# Patient Record
Sex: MALE | Race: WHITE | Employment: OTHER | URBAN - METROPOLITAN AREA
[De-identification: names, ages, dates, MRNs, and addresses within clinical notes are randomized per-mention and may not be internally consistent; named-entity substitution may affect disease eponyms.]

---

## 2017-03-01 PROBLEM — I10 ESSENTIAL HYPERTENSION, MALIGNANT: Status: ACTIVE | Noted: 2017-03-01

## 2017-03-01 PROBLEM — I70.0 AORTO-ILIAC ATHEROSCLEROSIS (HCC): Status: ACTIVE | Noted: 2017-03-01

## 2017-03-01 PROBLEM — I70.8 AORTO-ILIAC ATHEROSCLEROSIS: Status: ACTIVE | Noted: 2017-03-01

## 2017-03-01 PROBLEM — I70.0 AORTO-ILIAC ATHEROSCLEROSIS: Status: ACTIVE | Noted: 2017-03-01

## 2017-03-01 PROBLEM — I70.8 AORTO-ILIAC ATHEROSCLEROSIS (HCC): Status: ACTIVE | Noted: 2017-03-01

## 2017-05-17 PROCEDURE — 84154 ASSAY OF PSA FREE: CPT | Performed by: UROLOGY

## 2017-05-17 PROCEDURE — 84153 ASSAY OF PSA TOTAL: CPT | Performed by: UROLOGY

## 2017-05-17 PROCEDURE — 36415 COLL VENOUS BLD VENIPUNCTURE: CPT | Performed by: UROLOGY

## 2017-05-18 PROBLEM — N40.1 BENIGN NON-NODULAR PROSTATIC HYPERPLASIA WITH LOWER URINARY TRACT SYMPTOMS: Status: ACTIVE | Noted: 2017-05-18

## 2018-01-12 PROCEDURE — 36415 COLL VENOUS BLD VENIPUNCTURE: CPT | Performed by: UROLOGY

## 2018-01-12 PROCEDURE — 84154 ASSAY OF PSA FREE: CPT | Performed by: UROLOGY

## 2018-01-12 PROCEDURE — 84153 ASSAY OF PSA TOTAL: CPT | Performed by: UROLOGY

## 2018-01-18 PROBLEM — N28.1 KIDNEY CYST, ACQUIRED: Status: ACTIVE | Noted: 2018-01-18

## 2018-01-29 PROBLEM — N28.1 KIDNEY CYST, ACQUIRED: Status: RESOLVED | Noted: 2018-01-18 | Resolved: 2018-01-29

## 2018-01-29 PROBLEM — N40.1 BENIGN NON-NODULAR PROSTATIC HYPERPLASIA WITH LOWER URINARY TRACT SYMPTOMS: Status: RESOLVED | Noted: 2017-05-18 | Resolved: 2018-01-29

## 2018-01-29 PROBLEM — I10 ESSENTIAL HYPERTENSION, MALIGNANT: Status: RESOLVED | Noted: 2017-03-01 | Resolved: 2018-01-29

## 2018-07-12 PROCEDURE — 36415 COLL VENOUS BLD VENIPUNCTURE: CPT | Performed by: UROLOGY

## 2018-07-12 PROCEDURE — 84154 ASSAY OF PSA FREE: CPT | Performed by: UROLOGY

## 2018-07-12 PROCEDURE — 84153 ASSAY OF PSA TOTAL: CPT | Performed by: UROLOGY

## 2018-07-19 PROBLEM — R97.20 ELEVATED PROSTATE SPECIFIC ANTIGEN (PSA): Status: ACTIVE | Noted: 2018-07-19

## 2019-02-08 PROCEDURE — 84153 ASSAY OF PSA TOTAL: CPT | Performed by: UROLOGY

## 2019-02-08 PROCEDURE — 84154 ASSAY OF PSA FREE: CPT | Performed by: UROLOGY

## 2020-01-30 PROBLEM — F33.1 MODERATE EPISODE OF RECURRENT MAJOR DEPRESSIVE DISORDER (HCC): Status: ACTIVE | Noted: 2020-01-30

## 2022-02-02 PROBLEM — N40.0 BPH WITHOUT OBSTRUCTION/LOWER URINARY TRACT SYMPTOMS: Status: ACTIVE | Noted: 2017-05-18

## 2022-02-03 PROBLEM — Z85.828 HISTORY OF NONMELANOMA SKIN CANCER: Status: ACTIVE | Noted: 2022-02-03

## 2024-06-28 RX ORDER — OMEPRAZOLE 20 MG/1
20 CAPSULE, DELAYED RELEASE ORAL
COMMUNITY

## 2024-06-28 RX ORDER — CEFPODOXIME PROXETIL 200 MG/1
200 TABLET, FILM COATED ORAL 2 TIMES DAILY
Status: ON HOLD | COMMUNITY
End: 2024-07-03

## 2024-06-28 RX ORDER — PREDNISONE 20 MG/1
20 TABLET ORAL 3 TIMES DAILY
COMMUNITY

## 2024-07-03 ENCOUNTER — HOSPITAL ENCOUNTER (OUTPATIENT)
Facility: HOSPITAL | Age: 74
Setting detail: HOSPITAL OUTPATIENT SURGERY
Discharge: HOME | End: 2024-07-03
Attending: INTERNAL MEDICINE | Admitting: INTERNAL MEDICINE
Payer: MEDICARE

## 2024-07-03 ENCOUNTER — APPOINTMENT (OUTPATIENT)
Dept: GENERAL RADIOLOGY | Facility: HOSPITAL | Age: 74
End: 2024-07-03
Attending: INTERNAL MEDICINE
Payer: MEDICARE

## 2024-07-03 ENCOUNTER — ANESTHESIA EVENT (OUTPATIENT)
Dept: ENDOSCOPY | Facility: HOSPITAL | Age: 74
End: 2024-07-03
Payer: MEDICARE

## 2024-07-03 ENCOUNTER — ANESTHESIA (OUTPATIENT)
Dept: ENDOSCOPY | Facility: HOSPITAL | Age: 74
End: 2024-07-03
Payer: MEDICARE

## 2024-07-03 VITALS
RESPIRATION RATE: 16 BRPM | HEART RATE: 41 BPM | SYSTOLIC BLOOD PRESSURE: 169 MMHG | BODY MASS INDEX: 28.07 KG/M2 | OXYGEN SATURATION: 99 % | DIASTOLIC BLOOD PRESSURE: 75 MMHG | WEIGHT: 205 LBS | HEIGHT: 71.75 IN | TEMPERATURE: 98 F

## 2024-07-03 PROCEDURE — 0F7D8ZZ DILATION OF PANCREATIC DUCT, VIA NATURAL OR ARTIFICIAL OPENING ENDOSCOPIC: ICD-10-PCS | Performed by: INTERNAL MEDICINE

## 2024-07-03 PROCEDURE — 74328 X-RAY BILE DUCT ENDOSCOPY: CPT | Performed by: INTERNAL MEDICINE

## 2024-07-03 DEVICE — ZIMMON PANCREATIC STENT
Type: IMPLANTABLE DEVICE | Status: FUNCTIONAL
Brand: ZIMMON

## 2024-07-03 DEVICE — ZILVER 635 BILIARY STENT, EXPANDABLE METAL BILIARY STENT SYSTEM
Type: IMPLANTABLE DEVICE | Site: BILIARY | Status: FUNCTIONAL
Brand: ZILVER 635

## 2024-07-03 RX ORDER — INDOMETHACIN 100 MG
100 SUPPOSITORY, RECTAL RECTAL ONCE
Status: COMPLETED | OUTPATIENT
Start: 2024-07-03 | End: 2024-07-03

## 2024-07-03 RX ORDER — LEVOFLOXACIN 5 MG/ML
500 INJECTION, SOLUTION INTRAVENOUS ONCE
Status: COMPLETED | OUTPATIENT
Start: 2024-07-03 | End: 2024-07-03

## 2024-07-03 RX ORDER — INDOMETHACIN 100 MG
SUPPOSITORY, RECTAL RECTAL
Status: DISCONTINUED
Start: 2024-07-03 | End: 2024-07-03

## 2024-07-03 RX ORDER — SODIUM CHLORIDE, SODIUM LACTATE, POTASSIUM CHLORIDE, CALCIUM CHLORIDE 600; 310; 30; 20 MG/100ML; MG/100ML; MG/100ML; MG/100ML
100 INJECTION, SOLUTION INTRAVENOUS CONTINUOUS
Status: DISCONTINUED | OUTPATIENT
Start: 2024-07-03 | End: 2024-07-03

## 2024-07-03 RX ORDER — LEVOFLOXACIN 5 MG/ML
INJECTION, SOLUTION INTRAVENOUS
Status: DISCONTINUED
Start: 2024-07-03 | End: 2024-07-03

## 2024-07-03 RX ORDER — LIDOCAINE HYDROCHLORIDE 10 MG/ML
INJECTION, SOLUTION EPIDURAL; INFILTRATION; INTRACAUDAL; PERINEURAL AS NEEDED
Status: DISCONTINUED | OUTPATIENT
Start: 2024-07-03 | End: 2024-07-03 | Stop reason: SURG

## 2024-07-03 RX ORDER — SODIUM CHLORIDE, SODIUM LACTATE, POTASSIUM CHLORIDE, CALCIUM CHLORIDE 600; 310; 30; 20 MG/100ML; MG/100ML; MG/100ML; MG/100ML
INJECTION, SOLUTION INTRAVENOUS CONTINUOUS
Status: DISCONTINUED | OUTPATIENT
Start: 2024-07-03 | End: 2024-07-03

## 2024-07-03 RX ADMIN — LIDOCAINE HYDROCHLORIDE 100 MG: 10 INJECTION, SOLUTION EPIDURAL; INFILTRATION; INTRACAUDAL; PERINEURAL at 13:45:00

## 2024-07-03 RX ADMIN — SODIUM CHLORIDE, SODIUM LACTATE, POTASSIUM CHLORIDE, CALCIUM CHLORIDE: 600; 310; 30; 20 INJECTION, SOLUTION INTRAVENOUS at 14:20:00

## 2024-07-03 RX ADMIN — LEVOFLOXACIN 500 MG: 5 INJECTION, SOLUTION INTRAVENOUS at 13:47:00

## 2024-07-03 NOTE — DISCHARGE INSTRUCTIONS
Home Care Instructions for Colonoscopy and/or Gastroscopy with Sedation    Diet:  - Resume your regular diet as tolerated unless otherwise instructed.  - Start with light meals to minimize bloating.  - Do not drink alcohol today.    Medication:  - If you have questions about resuming your normal medications, please contact your Primary Care Physician.  - Resume Xarelto in 48 hours 7/5/24    Activities:  - Take it easy today. Do not return to work today.  - Do not drive today.  - Do not operate any machinery today (including kitchen equipment).    Colonoscopy:  - You may notice some rectal \"spotting\" (a little blood on the toilet tissue) for a day or two after the exam. This is normal.  - If you experience any rectal bleeding (not spotting), persistent tenderness or sharp severe abdominal pains, oral temperature over 100 degrees Fahrenheit, light-headedness or dizziness, or any other problems, contact your doctor.    Gastroscopy:  - You may have a sore throat for 2-3 days following the exam. This is normal. Gargling with warm salt water (1/2 tsp salt to 1 glass warm water) or using throat lozenges will help.  - If you experience any sharp pain in your neck, abdomen or chest, vomiting of blood, oral temperature over 100 degrees Fahrenheit, light-headedness or dizziness, or any other problems, contact your doctor.    **If unable to reach your doctor, please go to the [facility] Emergency Room**    - Your referring physician will receive a full report of your examination.  - If you do not hear from your doctor's office within two weeks of your biopsy, please call them for your results.    You may be able to see your laboratory results in [employer] between 4 and 7 business days.  In some cases, your physician may not have viewed the results before they are released to [employer].  If you have questions regarding your results contact the physician who ordered the test/exam by phone or via [employer] by choosing \"Ask a  Medical Question.\"

## 2024-07-03 NOTE — OPERATIVE REPORT
[facility] OPERATIVE REPORT   PATIENT NAME: Bruce R Salus  MRN: [id number]  DATE OF OPERATION: 7/3/2024  PREOPERATIVE DIAGNOSIS: hepatic hilar cholangiocarcinoma with increase in biliary obstruction upstream and acute rise in liver tests; liver tests improved rapidly within 2 days; MRCP showing high grade biliary stricture involving the R main intrahepatic duct (RHD) causing dilation of both right anterior duct (RAD) and right posterior duct (RPD); markedly dilated left intraheptic ducts with severe left hepatic lobe atrophy  POSTOPERATIVE DIAGNOSIS:    1.  Pancreatic duct cannulation with prophylactic pancreatic stent placement   2.  Normal CHD   3.  High grade 1cm stricture involving the RHD with dilated RAD and RPD.  Contrast from the RAD communicated with RPD s/p sphincterotomy and placement of 10 mm x 120 mm uncovered self-expanding metal stent (uc-SEMS)  PROCEDURE PERFORMED: Endoscopic Retrograde Cholangiopancreatoscopy with sphincterotomy and stent placement  SEDATION MEDICATIONS: MAC  PREOPERATIVE MEDS:  Levofloxacin 500 mg IVPB;  500cc lactated Ringer's solution IV  INTRAPROCEDURE MEDS:  indomethacin 100 mg Per rectum  PREPROCEDURE ASSESSMENT: The indication for this procedure is to assess for biliary stricture and stent placement. The patient was identified by myself and nursing staff in the exam room. Informed consent was obtained. The patient was seen in clinic and a full H&P was obtained. On brief physical examination, airway is patent. Chest is clear. Heart has regular rate and rhythm. Abdomen is soft, nontender with good bowel sounds. A medication list was taken by nursing today and reviewed by myself. The patient is an ASA grade 2.   PROCEDURE NOTE: The procedure was completed without difficulty. The patient tolerated the procedure well.   The side-viewing duodenoscope was introduced into the esophagus and advanced to the 2nd portion.  Ampulla was visualized and appeared normal.  Biliary  cannulation was attempted to be achieved with fusion omnitome preloaded with 035\" guidewire.  Unfortunately the wire was not going into the CBD or PD.  Therefore autotome 44 cannula with the same preloaded 035\" guidewire was used to cannulate.  Initially, the wire was seen going towards the pancreatic duct.  A precut sphincterotomy was performed.  A second guidewire was then used to cannulate the common bile duct easily.  Sphincterotomy was completed.  Contrast injection revealed a normal caliber common bile duct measuring 6 mm.  There was a high-grade 1 cm stricture involving the RHD with upstream dilation of the RAD.  Wire was advanced into the RAD and cannula was advanced over the guidewire.  While injecting contrast into the RAD, we able to opacify the RPD suggesting that the 2 ducts communicated with each other.  This would allow us to stent only the RAD since RPD is directly communicating into the RAD.  A prophylactic 5 fr x 5 cm single pigtail pancreatic stent with internal flange ripped off was successfully placed over the guidewire. A uc-SEMS 10 mm x 120 mm was placed over the guidewire with the proximal end of the stent seen within the dilated RAD in the distal end within the duodenum.  Excellent biliary drainage was noted.  Dark bile was seen coming through the papilla.  A waist within the proximal stent was seen at the level of the stricture. Scope was withdrawn from the patient and patient tolerated the procedure well.  FINDINGS   High-grade RHD stricture with upstream right intrahepatic ductal dilation was noted.  Successful metal stent placement.  Prophylactic pancreatic stent was placed.  RECOMMENDATIONS: Repeat LFTs in 1 to 2 weeks.  Follow-up with oncology  DISCHARGE:  On discharge, the patient was given an after-visit summary detailing the procedure, findings, followup plans, and an updated medication list.     Thank you very much for the consultation.  I really appreciate it.    Robby Betts  MD

## 2024-07-03 NOTE — ANESTHESIA POSTPROCEDURE EVALUATION
[facility]    Bruce R Salus Patient Status:  Hospital Outpatient Surgery   Age/Gender 73 year old male MRN [id number]   Location [facility] Attending Robby Betts MD   Hosp Day # 0 PCP No primary care provider on file.       Anesthesia Post-op Note    ENDOSCOPIC RETROGRADE CHOLANGIOPANCREATOGRAPHY (ERCP) WITH SPHINCTEROTOMY, PANCREATIC& BILIARY STENT PLACEMENT    Procedure Summary       Date: 07/03/24 Room / Location:  ENDOSCOPY 01 / [facility]    Anesthesia Start: 1338 Anesthesia Stop: 1420    Procedure: ENDOSCOPIC RETROGRADE CHOLANGIOPANCREATOGRAPHY (ERCP) WITH SPHINCTEROTOMY, PANCREATIC& BILIARY STENT PLACEMENT Diagnosis: (BILIARY STRICTURE)    Surgeons: Robby Betts MD Anesthesiologist: Myerson, David, MD    Anesthesia Type: MAC ASA Status: 2            Anesthesia Type: MAC    Vitals Value Taken Time   /74 07/03/24 1420   Temp 97.6 °F (36.4 °C) 07/03/24 1420   Pulse 47 07/03/24 1420   Resp 16 07/03/24 1420   SpO2 98 % 07/03/24 1420   Vitals shown include unfiled device data.    Patient Location: Endoscopy    Anesthesia Type: MAC    Airway Patency: patent    Postop Pain Control: adequate    Mental Status: mildly sedated but able to meaningfully participate in the post-anesthesia evaluation    Nausea/Vomiting: none    Cardiopulmonary/Hydration status: stable euvolemic    Complications: no apparent anesthesia related complications    Postop vital signs: stable    Dental Exam: Unchanged from Preop    Patient to be discharged home when criteria met.

## 2024-07-03 NOTE — ANESTHESIA PREPROCEDURE EVALUATION
PRE-OP EVALUATION    Patient Name: Bruce R Salus    Admit Diagnosis: BILIARY OBSTRUCTION, DILATION OF BILIARY TRACT, ELEVATED LIVER FUNCTION    Pre-op Diagnosis: BILIARY OBSTRUCTION, DILATION OF BILIARY TRACT, ELEVATED LIVER FUNCTION    ENDOSCOPIC RETROGRADE CHOLANGIOPANCREATOGRAPHY (ERCP) with flouroscopy and stent placement    Anesthesia Procedure: ENDOSCOPIC RETROGRADE CHOLANGIOPANCREATOGRAPHY (ERCP) with flouroscopy and stent placement    Surgeons and Role:     * Robby Betts MD - Primary    Pre-op vitals reviewed.        Body mass index is 28 kg/m².    Current medications reviewed.  Hospital Medications:  No current facility-administered medications on file as of .       Outpatient Medications:     No medications prior to admission.       Allergies: Patient has no known allergies.      Anesthesia Evaluation    Patient summary reviewed.    Anesthetic Complications  (-) history of anesthetic complications         GI/Hepatic/Renal      (+) GERD                           Cardiovascular      ECG reviewed.  Exercise tolerance: good     MET: >4      (+) hypertension                       (-) angina     (-) MEZA         Endo/Other    Negative endo/other ROS.                              Pulmonary               (-) shortness of breath     (+) sleep apnea and CPAP      Neuro/Psych      (+) depression                        Patient Active Problem List:     Essential hypertension     KAREN on CPAP     History of kidney stones     Aorto-iliac atherosclerosis (HCC)     BPH without obstruction/lower urinary tract symptoms     Elevated prostate specific antigen (PSA)     Moderate episode of recurrent major depressive disorder (HCC)     BMI 28.0-28.9,adult     History of nonmelanoma skin cancer            Past Surgical History:   Procedure Laterality Date    Cholecystectomy      Closed rx nose fx w stabilizatn  03/21/2013    Procedure: CLOSED REDUCTION NASAL FRACTURE;  Surgeon: Lianna Luna MD;  [facility]    Colonoscopy N/A 2021    Procedure: COLONOSCOPY, with polypectomy;  Surgeon: Gaurav Cruz MD;  Location: [facility]    Colonoscopy & polypectomy  10/07/2015    a small polyp was removed; [facility]    Colonoscopy,remv lesn,snare N/A 10/07/2015    Procedure: COLONOSCOPY, POSSIBLE BIOPSY, POSSIBLE POLYPECTOMY 53175;  Surgeon: Gaurav Cruz MD;  Location: [facility]    Cystoscopy,insert ureteral stent  2013    Procedure: CYSTOSCOPY/URETEROSCOPY/STONE EXTRACTION;  Surgeon: Eduin Luna MD;  Location: [facility]    Cystouretero w/lithotripsy  2013    Procedure: LITHOTRIPSY HOLMIUM LASER WITH CYSTOSCOPY;  Surgeon: Eduin Luna MD;  Location: [facility]    Cystouretro w/stone remove  2013    Procedure: CYSTO, WITH INSERTION OF STENT;  Surgeon: Eduin Luna MD;  Location: [facility]    Other surgical history      KIDNEY STONE REMOVED     Other surgical history      excision of left renal cyst     Other surgical history  11/15/2012    Flow US, RBUS    Other surgical history  2013    flow us- DR. Wilson    Other surgical history  2016    Cystoscopy    Skin surgery  09/15/2020    [facility]-Left ear-Dr. RACHEL Hull     X-ray retrograde pyelogram  2013    Procedure: CYSTOSCOPY/URETEROSCOPY/STONE EXTRACTION;  Surgeon: Eduin Luna MD;  Location: [facility]     Social History     Socioeconomic History    Marital status:    Occupational History    Occupation: corporate insurance    Tobacco Use    Smoking status: Former     Current packs/day: 0.00     Average packs/day: 0.2 packs/day for 8.0 years (1.6 ttl pk-yrs)     Types: Cigarettes     Start date: 1973     Quit date: 1981     Years since quittin.5    Smokeless tobacco: Never   Vaping Use    Vaping status: Never Used   Substance and Sexual Activity    Alcohol use: Yes     Alcohol/week: 0.8 standard drinks of alcohol     Types: 1  Standard drinks or equivalent per week     Comment: socially/weekends beer,wine    Drug use: No    Sexual activity: Yes     Partners: Female     Comment:  1980   Other Topics Concern     Service No    Blood Transfusions No    Caffeine Concern Yes    Hobby Hazards No    Stress Concern No    Special Diet Yes    Exercise Yes    Seat Belt Yes    Self-Exams Yes     History   Drug Use No     Available pre-op labs reviewed.               Airway      Mallampati: II  Mouth opening: >3 FB  TM distance: > 6 cm  Neck ROM: full Cardiovascular    Cardiovascular exam normal.         Dental    Dentition appears grossly intact         Pulmonary    Pulmonary exam normal.                 Other findings              ASA: 2   Plan: MAC  NPO status verified and patient meets guidelines.    Post-procedure pain management plan discussed with surgeon and patient.      Plan/risks discussed with: patient                Present on Admission:  **None**

## 2024-07-03 NOTE — H&P
History & Physical Examination    Patient Name: Bruce R Salus  MRN: [id number]  CSN: [id number]  YOB: 1950    Diagnosis: BILIARY OBSTRUCTION, DILATION OF BILIARY TRACT, ELEVATED LIVER FUNCTION      Present Illness:  Bruce R Salus is a 73 year old male is here BILIARY OBSTRUCTION, DILATION OF BILIARY TRACT, ELEVATED LIVER FUNCTION.    Body mass index is 28 kg/m².    Past Medical History:    Calculus of kidney    Cancer (HCC)    bile duct cancer    Esophageal reflux    High blood pressure    High cholesterol    Hypercholesterolemia    HYPERTENSION    Hypertension    Kidney disease    Moderate episode of recurrent major depressive disorder (HCC)    KAREN (obstructive sleep apnea)    mild, AHI 10, O2 jas 84%, AutoPAP 8-20    Other and unspecified hyperlipidemia    OTHER DISEASES    pneumonia after flu    Personal history of antineoplastic chemotherapy    last infusion Aug 2023    Pneumonia    Reflux    Sleep apnea    Unspecified essential hypertension    Visual impairment       Procedure: ERCP    Physician Pre-Sedation Assessment    Pre-Sedation Assessment:    Sedation History: Airway Assessed    ASA Classification: 2. Patient with mild systemic disease    Cardiac:    Respiratory:    Abdomen:      Plan: MAC        Current Facility-Administered Medications   Medication Dose Route Frequency    lactated ringers infusion   Intravenous Continuous    lactated ringers IV bolus 1,000 mL  1,000 mL Intravenous Once    Followed by    lactated ringers infusion  100 mL/hr Intravenous Continuous    indomethacin (Indocin) 100 MG rectal suppository 100 mg  100 mg Rectal Once    levoFLOXacin in dextrose 5% (Levaquin) 500 mg/100mL IVPB premix 500 mg  500 mg Intravenous Once    levoFLOXacin in dextrose 5% (Levaquin) 500 mg/100mL IVPB premix        indomethacin (Indocin) 100 MG rectal suppository           Allergies: No Known Allergies    Past Surgical History:   Procedure Laterality Date    Cholecystectomy      Closed rx  nose fx w stabilizatn  2013    Procedure: CLOSED REDUCTION NASAL FRACTURE;  Surgeon: Lianna Luna MD;  Location: [facility]    Colonoscopy N/A 2021    Procedure: COLONOSCOPY, with polypectomy;  Surgeon: Gaurav Cruz MD;  Location: [facility]    Colonoscopy & polypectomy  10/07/2015    a small polyp was removed; [facility]    Colonoscopy,remv lesn,snare N/A 10/07/2015    Procedure: COLONOSCOPY, POSSIBLE BIOPSY, POSSIBLE POLYPECTOMY 56073;  Surgeon: Gaurav Cruz MD;  Location: [facility]    Cystoscopy,insert ureteral stent  2013    Procedure: CYSTOSCOPY/URETEROSCOPY/STONE EXTRACTION;  Surgeon: Eduin Luna MD;  Location: [facility]    Cystouretero w/lithotripsy  2013    Procedure: LITHOTRIPSY HOLMIUM LASER WITH CYSTOSCOPY;  Surgeon: Eduin Luna MD;  Location: [facility]    Cystouretro w/stone remove  2013    Procedure: CYSTO, WITH INSERTION OF STENT;  Surgeon: Eduin Luna MD;  Location: [facility]    Other surgical history      KIDNEY STONE REMOVED     Other surgical history      excision of left renal cyst     Other surgical history  11/15/2012    Flow US RBUS    Other surgical history  2013    flow - DR. Wilson    Other surgical history  2016    Cystoscopy    Skin surgery  09/15/2020    [facility]-SCC-Left ear-Dr. RACHEL Hull     X-ray retrograde pyelogram  2013    Procedure: CYSTOSCOPY/URETEROSCOPY/STONE EXTRACTION;  Surgeon: Eduin Luna MD;  Location: [facility]     Family History   Problem Relation Age of Onset    Other (Other) Father         sepsis    Cancer Mother         lymphoma    Cancer Sister         thyroid CA     Social History     Tobacco Use    Smoking status: Former     Current packs/day: 0.00     Average packs/day: 0.2 packs/day for 8.0 years (1.6 ttl pk-yrs)     Types: Cigarettes     Start date: 1973     Quit date: 1981     Years since quittin.5     Smokeless tobacco: Never   Substance Use Topics    Alcohol use: Yes     Alcohol/week: 0.8 standard drinks of alcohol     Types: 1 Standard drinks or equivalent per week     Comment: socially/weekends beer,wine       SYSTEM Check if Review is Normal Check if Physical Exam is Normal If not normal, please explain:   HEENT [x ] [x ]    NECK & BACK [x ] [x ]    HEART [x ] [x ]    LUNGS [x ] [x ]    ABDOMEN [x ] [x ]    UROGENITAL [ ] [ ]    EXTREMITIES [ ] [ ]    OTHER        [ x ] I have discussed the risks and benefits and alternatives with the patient/family.  They understand and agree to proceed with plan of care.  [ x ] I have reviewed the History and Physical done within the last 30 days.  Any changes noted above.    Robby Betts MD  7/3/2024  1:42 PM

## 2024-07-11 ENCOUNTER — HOSPITAL ENCOUNTER (EMERGENCY)
Facility: HOSPITAL | Age: 74
Discharge: HOME | End: 2024-07-11
Attending: EMERGENCY MEDICINE
Payer: MEDICARE

## 2024-07-11 ENCOUNTER — APPOINTMENT (OUTPATIENT)
Dept: CT IMAGING | Facility: HOSPITAL | Age: 74
End: 2024-07-11
Attending: EMERGENCY MEDICINE
Payer: MEDICARE

## 2024-07-11 VITALS
TEMPERATURE: 98 F | HEART RATE: 84 BPM | WEIGHT: 205 LBS | OXYGEN SATURATION: 98 % | BODY MASS INDEX: 28 KG/M2 | SYSTOLIC BLOOD PRESSURE: 126 MMHG | DIASTOLIC BLOOD PRESSURE: 67 MMHG | RESPIRATION RATE: 16 BRPM

## 2024-07-11 DIAGNOSIS — C22.1 CHOLANGIOCARCINOMA (HCC): ICD-10-CM

## 2024-07-11 DIAGNOSIS — R10.13 ABDOMINAL PAIN, EPIGASTRIC: Primary | ICD-10-CM

## 2024-07-11 LAB
ALBUMIN SERPL-MCNC: 3 G/DL (ref 3.4–5)
ALBUMIN/GLOB SERPL: 0.9 {RATIO} (ref 1–2)
ALP LIVER SERPL-CCNC: 82 U/L
ALT SERPL-CCNC: 56 U/L
ANION GAP SERPL CALC-SCNC: 3 MMOL/L (ref 0–18)
AST SERPL-CCNC: 27 U/L (ref 15–37)
ATRIAL RATE: 52 BPM
BASOPHILS # BLD AUTO: 0.02 X10(3) UL (ref 0–0.2)
BASOPHILS NFR BLD AUTO: 0.3 %
BILIRUB SERPL-MCNC: 0.8 MG/DL (ref 0.1–2)
BILIRUB UR QL STRIP.AUTO: NEGATIVE
BUN BLD-MCNC: 24 MG/DL (ref 9–23)
CALCIUM BLD-MCNC: 9.4 MG/DL (ref 8.5–10.1)
CHLORIDE SERPL-SCNC: 107 MMOL/L (ref 98–112)
CLARITY UR REFRACT.AUTO: CLEAR
CO2 SERPL-SCNC: 27 MMOL/L (ref 21–32)
COLOR UR AUTO: YELLOW
CREAT BLD-MCNC: 1.15 MG/DL
EGFRCR SERPLBLD CKD-EPI 2021: 66 ML/MIN/1.73M2 (ref 60–?)
EOSINOPHIL # BLD AUTO: 0.07 X10(3) UL (ref 0–0.7)
EOSINOPHIL NFR BLD AUTO: 1 %
ERYTHROCYTE [DISTWIDTH] IN BLOOD BY AUTOMATED COUNT: 15.5 %
GLOBULIN PLAS-MCNC: 3.3 G/DL (ref 2.8–4.4)
GLUCOSE BLD-MCNC: 98 MG/DL (ref 70–99)
GLUCOSE UR STRIP.AUTO-MCNC: NORMAL MG/DL
HCT VFR BLD AUTO: 42.5 %
HGB BLD-MCNC: 13.7 G/DL
IMM GRANULOCYTES # BLD AUTO: 0.04 X10(3) UL (ref 0–1)
IMM GRANULOCYTES NFR BLD: 0.5 %
KETONES UR STRIP.AUTO-MCNC: NEGATIVE MG/DL
LEUKOCYTE ESTERASE UR QL STRIP.AUTO: NEGATIVE
LIPASE SERPL-CCNC: 73 U/L (ref 13–75)
LYMPHOCYTES # BLD AUTO: 0.81 X10(3) UL (ref 1–4)
LYMPHOCYTES NFR BLD AUTO: 11 %
MCH RBC QN AUTO: 29.8 PG (ref 26–34)
MCHC RBC AUTO-ENTMCNC: 32.2 G/DL (ref 31–37)
MCV RBC AUTO: 92.6 FL
MONOCYTES # BLD AUTO: 0.9 X10(3) UL (ref 0.1–1)
MONOCYTES NFR BLD AUTO: 12.2 %
NEUTROPHILS # BLD AUTO: 5.51 X10 (3) UL (ref 1.5–7.7)
NEUTROPHILS # BLD AUTO: 5.51 X10(3) UL (ref 1.5–7.7)
NEUTROPHILS NFR BLD AUTO: 75 %
NITRITE UR QL STRIP.AUTO: NEGATIVE
OSMOLALITY SERPL CALC.SUM OF ELEC: 288 MOSM/KG (ref 275–295)
P AXIS: 12 DEGREES
P-R INTERVAL: 148 MS
PH UR STRIP.AUTO: 6.5 [PH] (ref 5–8)
PLATELET # BLD AUTO: 119 10(3)UL (ref 150–450)
PLATELETS.RETICULATED NFR BLD AUTO: 3.3 % (ref 0–7)
POTASSIUM SERPL-SCNC: 4.9 MMOL/L (ref 3.5–5.1)
PROT SERPL-MCNC: 6.3 G/DL (ref 6.4–8.2)
PROT UR STRIP.AUTO-MCNC: NEGATIVE MG/DL
Q-T INTERVAL: 400 MS
QRS DURATION: 90 MS
QTC CALCULATION (BEZET): 372 MS
R AXIS: -16 DEGREES
RBC # BLD AUTO: 4.59 X10(6)UL
RBC UR QL AUTO: NEGATIVE
SODIUM SERPL-SCNC: 137 MMOL/L (ref 136–145)
SP GR UR STRIP.AUTO: 1.02 (ref 1–1.03)
T AXIS: 4 DEGREES
UROBILINOGEN UR STRIP.AUTO-MCNC: NORMAL MG/DL
VENTRICULAR RATE: 52 BPM
WBC # BLD AUTO: 7.4 X10(3) UL (ref 4–11)

## 2024-07-11 PROCEDURE — 99284 EMERGENCY DEPT VISIT MOD MDM: CPT

## 2024-07-11 PROCEDURE — 80053 COMPREHEN METABOLIC PANEL: CPT | Performed by: EMERGENCY MEDICINE

## 2024-07-11 PROCEDURE — 85025 COMPLETE CBC W/AUTO DIFF WBC: CPT | Performed by: EMERGENCY MEDICINE

## 2024-07-11 PROCEDURE — 81003 URINALYSIS AUTO W/O SCOPE: CPT | Performed by: EMERGENCY MEDICINE

## 2024-07-11 PROCEDURE — 83690 ASSAY OF LIPASE: CPT | Performed by: EMERGENCY MEDICINE

## 2024-07-11 PROCEDURE — 93010 ELECTROCARDIOGRAM REPORT: CPT

## 2024-07-11 PROCEDURE — 93005 ELECTROCARDIOGRAM TRACING: CPT

## 2024-07-11 PROCEDURE — 83690 ASSAY OF LIPASE: CPT

## 2024-07-11 PROCEDURE — 80053 COMPREHEN METABOLIC PANEL: CPT

## 2024-07-11 PROCEDURE — 74177 CT ABD & PELVIS W/CONTRAST: CPT | Performed by: EMERGENCY MEDICINE

## 2024-07-11 PROCEDURE — 85025 COMPLETE CBC W/AUTO DIFF WBC: CPT

## 2024-07-11 PROCEDURE — 36415 COLL VENOUS BLD VENIPUNCTURE: CPT

## 2024-07-11 PROCEDURE — 99285 EMERGENCY DEPT VISIT HI MDM: CPT

## 2024-07-11 RX ORDER — HYDROCODONE BITARTRATE AND ACETAMINOPHEN 5; 325 MG/1; MG/1
1-2 TABLET ORAL EVERY 6 HOURS PRN
Qty: 10 TABLET | Refills: 0 | Status: SHIPPED | OUTPATIENT
Start: 2024-07-11 | End: 2024-07-16

## 2024-07-11 NOTE — ED PROVIDER NOTES
Patient Seen in: [facility] Emergency Department      History     Chief Complaint   Patient presents with    Abdomen/Flank Pain     Stated Complaint: pt had recent stent placed, low grade fever, pain    Subjective:   HPI    Patient comes to the emergency department with upper abdominal discomfort which has recently increased.  The patient is recently status post ERCP with stent placement for cholangiocarcinoma earlier this month.  Patient states that he has had ongoing, mild upper abdominal discomfort since the procedure, but it is increased in the past day or 2.  Additionally, the patient has noted an elevated temperature, but it has not reached 100 °F.  Patient has had no nausea or vomiting.  He has had no diarrhea.  His pain at this time is mild.    Objective:   Past Medical History:    Calculus of kidney    Cancer (HCC)    bile duct cancer    Chemotherapy-induced nausea    Cholangiocarcinoma (HCC)    Esophageal reflux    High blood pressure    High cholesterol    Hypercholesterolemia    HYPERTENSION    Hypertension    Kidney disease    LFT elevation    Metastasis to peritoneal cavity (HCC)    Moderate episode of recurrent major depressive disorder (HCC)    KAREN (obstructive sleep apnea)    mild, AHI 10, O2 jas 84%, AutoPAP 8-20    Other and unspecified hyperlipidemia    OTHER DISEASES    pneumonia after flu    Personal history of antineoplastic chemotherapy    last infusion Aug 2023    Pneumonia    Reflux    Sleep apnea    Unspecified essential hypertension    Visual impairment              Past Surgical History:   Procedure Laterality Date    Cholecystectomy      Closed rx nose fx w stabilizatn  03/21/2013    Procedure: CLOSED REDUCTION NASAL FRACTURE;  Surgeon: Lianna Luna MD;  Location: [facility]    Colonoscopy N/A 08/24/2021    Procedure: COLONOSCOPY, with polypectomy;  Surgeon: Gaurav Cruz MD;  Location: [facility]    Colonoscopy & polypectomy  10/07/2015    a  small polyp was removed; [facility]    Colonoscopy,remv lesn,snare N/A 10/07/2015    Procedure: COLONOSCOPY, POSSIBLE BIOPSY, POSSIBLE POLYPECTOMY 26721;  Surgeon: Gaurav Cruz MD;  Location: [facility]    Cystoscopy,insert ureteral stent  2013    Procedure: CYSTOSCOPY/URETEROSCOPY/STONE EXTRACTION;  Surgeon: Eduin Luna MD;  Location: [facility]    Cystouretero w/lithotripsy  2013    Procedure: LITHOTRIPSY HOLMIUM LASER WITH CYSTOSCOPY;  Surgeon: Eduin Luna MD;  Location: [facility]    Cystouretro w/stone remove  2013    Procedure: CYSTO, WITH INSERTION OF STENT;  Surgeon: Eduin Luna MD;  Location: [facility]    Other surgical history      KIDNEY STONE REMOVED     Other surgical history      excision of left renal cyst     Other surgical history  11/15/2012    Flow US, RBUS    Other surgical history  2013    flow us- DR. Wilson    Other surgical history  2016    Cystoscopy    Skin surgery  09/15/2020    [facility]CC-Left ear-Dr. RACHEL Hull     X-ray retrograde pyelogram  2013    Procedure: CYSTOSCOPY/URETEROSCOPY/STONE EXTRACTION;  Surgeon: Eduin Luna MD;  Location: [facility]                Social History     Socioeconomic History    Marital status:    Occupational History    Occupation: corporate insurance    Tobacco Use    Smoking status: Former     Current packs/day: 0.00     Average packs/day: 0.2 packs/day for 8.0 years (1.6 ttl pk-yrs)     Types: Cigarettes     Start date: 1973     Quit date: 1981     Years since quittin.5    Smokeless tobacco: Never   Vaping Use    Vaping status: Never Used   Substance and Sexual Activity    Alcohol use: Yes     Alcohol/week: 0.8 standard drinks of alcohol     Types: 1 Standard drinks or equivalent per week     Comment: socially/weekends beer,wine    Drug use: Yes     Types: Cannabis    Sexual activity: Yes     Partners: Female     Comment:      Other Topics Concern     Service No    Blood Transfusions No    Caffeine Concern Yes    Hobby Hazards No    Stress Concern No    Special Diet Yes    Exercise Yes    Seat Belt Yes    Self-Exams Yes              Review of Systems    Positive for stated complaint: pt had recent stent placed, low grade fever, pain  Other systems are as noted in HPI.  Constitutional and vital signs reviewed.      All other systems reviewed and negative except as noted above.    Physical Exam     ED Triage Vitals [07/11/24 1117]   /63   Pulse 63   Resp 20   Temp 98.1 °F (36.7 °C)   Temp src Oral   SpO2 98 %   O2 Device None (Room air)       Current:/67   Pulse 84   Temp 98.1 °F (36.7 °C) (Oral)   Resp 16   Wt 93 kg   SpO2 98%   BMI 28.00 kg/m²         Physical Exam  Vitals and nursing note reviewed.   Constitutional:       Appearance: He is well-developed.   HENT:      Head: Normocephalic.   Eyes:      General: No scleral icterus.  Cardiovascular:      Rate and Rhythm: Normal rate and regular rhythm.      Heart sounds: Normal heart sounds. No murmur heard.  Pulmonary:      Effort: Pulmonary effort is normal.      Breath sounds: Normal breath sounds.   Abdominal:      General: Bowel sounds are normal.      Palpations: Abdomen is soft.      Tenderness: There is abdominal tenderness in the epigastric area. There is no guarding.      Comments: Mild epigastric discomfort without any peritoneal findings.   Musculoskeletal:         General: No tenderness. Normal range of motion.      Cervical back: Normal range of motion and neck supple.   Lymphadenopathy:      Cervical: No cervical adenopathy.   Skin:     General: Skin is warm and dry.      Coloration: Skin is not jaundiced.      Findings: No rash.   Neurological:      Mental Status: He is alert and oriented to person, place, and time.      Sensory: No sensory deficit.              ED Course     Labs Reviewed   COMP METABOLIC PANEL (14) - Abnormal; Notable for the  following components:       Result Value    BUN 24 (*)     Total Protein 6.3 (*)     Albumin 3.0 (*)     A/G Ratio 0.9 (*)     All other components within normal limits   CBC W/ DIFFERENTIAL - Abnormal; Notable for the following components:    .0 (*)     Lymphocyte Absolute 0.81 (*)     All other components within normal limits   LIPASE - Normal   CBC WITH DIFFERENTIAL WITH PLATELET    Narrative:     The following orders were created for panel order CBC With Differential With Platelet.  Procedure                               Abnormality         Status                     ---------                               -----------         ------                     CBC W/ DIFFERENTIAL[[id number]]          Abnormal            Final result                 Please view results for these tests on the individual orders.   URINALYSIS WITH CULTURE REFLEX   RAINBOW DRAW BLUE     EKG    Rate, intervals and axes as noted on EKG Report.  Rate: 52  Rhythm: Sinus Rhythm  Reading: No ischemic ST or T wave abnormalities.           ED Course as of 07/11/24 1639  ------------------------------------------------------------  Time: 07/11 1300  Value: Lipase: 73  Comment: (Reviewed)  ------------------------------------------------------------  Time: 07/11 1300  Value: Comp Metabolic Panel (14)(!)  Comment: Unremarkable, including transaminases and bilirubin.  ------------------------------------------------------------  Time: 07/11 1301  Value: CBC With Differential With Platelet(!)  Comment: Unremarkable     Medications   iopamidol 76% (ISOVUE-370) injection for power injector (100 mL Intravenous Given 7/11/24 8292)       Patient was signed over to my colleague Dr. Francis.  Final disposition is pending results of CT of abdomen pelvis performed to evaluate for differential of worsening carcinoma, bowel obstruction, pathologic fluid collection or other acute hepatobiliary pathology.         MDM      Patient presents to the emerged  department with upper abdominal pain in the recent aftermath of stent placement for cholangiocarcinoma.  Patient in the emergency department remained comfortable and had no hemodynamic instability.  He declined pain medication when asked.  Final disposition is pending CT result.                                   Medical Decision Making      Disposition and Plan     Clinical Impression:  1. Abdominal pain, epigastric    2. Cholangiocarcinoma (HCC)         Disposition:  Final disposition is pending CT result        Medications Prescribed:  Current Discharge Medication List

## 2024-07-11 NOTE — ED INITIAL ASSESSMENT (HPI)
Patient had ERCP on 7/3 with stent placement. Patient has upper abdominal discomfort, nausea, low grade fever. Hx of cholangiocarcinoma.

## 2024-07-11 NOTE — DISCHARGE INSTRUCTIONS
As discussed, there is Tylenol and Norco.  There is 325 mg of Tylenol in each Norco.  You can still take Tylenol with Norco but do not take more than 4 g total of Tylenol per day.

## 2024-08-29 ENCOUNTER — ANESTHESIA (OUTPATIENT)
Dept: ENDOSCOPY | Facility: HOSPITAL | Age: 74
End: 2024-08-29
Payer: MEDICARE

## 2024-08-29 ENCOUNTER — HOSPITAL ENCOUNTER (OUTPATIENT)
Facility: HOSPITAL | Age: 74
Setting detail: HOSPITAL OUTPATIENT SURGERY
Discharge: HOME | End: 2024-08-29
Attending: INTERNAL MEDICINE | Admitting: INTERNAL MEDICINE
Payer: MEDICARE

## 2024-08-29 ENCOUNTER — ANESTHESIA EVENT (OUTPATIENT)
Dept: ENDOSCOPY | Facility: HOSPITAL | Age: 74
End: 2024-08-29
Payer: MEDICARE

## 2024-08-29 VITALS
OXYGEN SATURATION: 95 % | SYSTOLIC BLOOD PRESSURE: 150 MMHG | HEART RATE: 48 BPM | RESPIRATION RATE: 16 BRPM | WEIGHT: 203 LBS | HEIGHT: 71 IN | DIASTOLIC BLOOD PRESSURE: 67 MMHG | BODY MASS INDEX: 28.42 KG/M2

## 2024-08-29 PROCEDURE — 0DJ08ZZ INSPECTION OF UPPER INTESTINAL TRACT, VIA NATURAL OR ARTIFICIAL OPENING ENDOSCOPIC: ICD-10-PCS | Performed by: INTERNAL MEDICINE

## 2024-08-29 RX ORDER — SODIUM CHLORIDE, SODIUM LACTATE, POTASSIUM CHLORIDE, CALCIUM CHLORIDE 600; 310; 30; 20 MG/100ML; MG/100ML; MG/100ML; MG/100ML
INJECTION, SOLUTION INTRAVENOUS CONTINUOUS
Status: DISCONTINUED | OUTPATIENT
Start: 2024-08-29 | End: 2024-08-29

## 2024-08-29 RX ADMIN — SODIUM CHLORIDE, SODIUM LACTATE, POTASSIUM CHLORIDE, CALCIUM CHLORIDE: 600; 310; 30; 20 INJECTION, SOLUTION INTRAVENOUS at 15:59:00

## 2024-08-29 NOTE — OPERATIVE REPORT
OPERATIVE REPORT   PATIENT NAME: Bruce R Salus  MRN: [id number]  DATE OF OPERATION: 8/29/2024  PREOPERATIVE DIAGNOSIS: History of cholangiocarcinoma with metal stent placement to the right; here for removal of pancreatic stent; recent onset of rigors and fevers; workup has been unremarkable with normal liver tests ; most recently when found to have positive EBV spot test  POSTOPERATIVE DIAGNOSIS:    1.  EGD    -Large amounts of food debris appearing to partially occluded biliary metal stent    -Pancreatic stent removed successfully with snare  PROCEDURE PERFORMED: Esophagogastroduodenoscopy with foreign body removal  SEDATION MEDICATIONS: MAC  MODERATE SEDATION TIME: None.  Deep sedation provided by anesthesia  PREPROCEDURE ASSESSMENT: The indication for this procedure is to assess for stent removal. The patient was identified by myself and nursing staff in the exam room. Informed consent was obtained. The patient was seen in clinic and a full H&P was obtained. On brief physical examination, airway is patent. Chest is clear. Heart has regular rate and rhythm. Abdomen is soft, nontender with good bowel sounds. A medication list was taken by nursing today and reviewed by myself. The patient is an ASA grade 2.   PROCEDURE NOTE: The procedure was completed without difficulty. The patient tolerated the procedure well. The side-viewing duodenal scope was inserted through the mouth and advanced to the level of the duodenum, 3rd portion.  Through the papilla, previously placed metal stent was visualized.  There was food debris around the stent and in the lumen of the stent as well.  Rat-tooth forcep was used to dislodge some of the food debris.  No significant bile drainage was visualized.  Previously placed pancreatic stent was removed through the scope using a snare. There were no immediate complications.   FINDINGS   Pancreatic stent was removed  If patient continue to have fevers or rigors, we could consider repeat ERCP  and clean out the food debris.  Will re-address strict low-roughage diet  RECOMMENDATIONS:   DISCHARGE:  On discharge, the patient was given an after-visit summary detailing the procedure, findings, followup plans, and an updated medication list.     Thank you very much for the consultation.  I really appreciate it.    Robby Betts MD

## 2024-08-29 NOTE — H&P
History & Physical Examination    Patient Name: Bruce R Salus  MRN: [id number]  CSN: [id number]  YOB: 1950    Diagnosis: Presence of pancreatic duct stent      Present Illness:  Bruce R Salus is a 73 year old male is here Presence of pancreatic duct stent.    Body mass index is 28.31 kg/m².    Past Medical History:    Calculus of kidney    Cancer (HCC)    bile duct cancer    Chemotherapy-induced nausea    Cholangiocarcinoma (HCC)    Esophageal reflux    High blood pressure    High cholesterol    Hypercholesterolemia    HYPERTENSION    Hypertension    Kidney disease    LFT elevation    Metastasis to peritoneal cavity (HCC)    Moderate episode of recurrent major depressive disorder (HCC)    KAREN (obstructive sleep apnea)    mild, AHI 10, O2 jas 84%, AutoPAP 8-20    Other and unspecified hyperlipidemia    OTHER DISEASES    pneumonia after flu    Personal history of antineoplastic chemotherapy    last infusion Aug 2023    Pneumonia    Reflux    Sleep apnea    Unspecified essential hypertension    Visual impairment       Procedure: EGD    Physician Pre-Sedation Assessment    Pre-Sedation Assessment:    Sedation History: Airway Assessed    ASA Classification: 2. Patient with mild systemic disease    Cardiac:    Respiratory:    Abdomen:      Plan: MAC        Current Facility-Administered Medications   Medication Dose Route Frequency    lactated ringers infusion   Intravenous Continuous       Allergies:   Allergies   Allergen Reactions    Chlorhexidine RASH     Clarissa body wipes       Past Surgical History:   Procedure Laterality Date    Cholecystectomy      Closed rx nose fx w stabilizatn  03/21/2013    Procedure: CLOSED REDUCTION NASAL FRACTURE;  Surgeon: Lianna Luna MD;  Location: [facility]    Colonoscopy N/A 08/24/2021    Procedure: COLONOSCOPY, with polypectomy;  Surgeon: Gaurav Cruz MD;  Location: [facility]    Colonoscopy & polypectomy  10/07/2015    a small polyp  was removed; ASC    Colonoscopy,remv lesn,snare N/A 10/07/2015    Procedure: COLONOSCOPY, POSSIBLE BIOPSY, POSSIBLE POLYPECTOMY 20025;  Surgeon: Gaurav Cruz MD;  Location: [facility]    Cystoscopy,insert ureteral stent  2013    Procedure: CYSTOSCOPY/URETEROSCOPY/STONE EXTRACTION;  Surgeon: Eduin Luna MD;  Location: [facility]    Cystouretero w/lithotripsy  2013    Procedure: LITHOTRIPSY HOLMIUM LASER WITH CYSTOSCOPY;  Surgeon: Eduin Luna MD;  Location: [facility]    Cystouretro w/stone remove  2013    Procedure: CYSTO, WITH INSERTION OF STENT;  Surgeon: Eduin Luna MD;  Location: [facility]    Other surgical history      KIDNEY STONE REMOVED     Other surgical history      excision of left renal cyst     Other surgical history  11/15/2012    Flow US, RBUS    Other surgical history  2013    flow us- DR. Wilson    Other surgical history  2016    Cystoscopy    Skin surgery  09/15/2020    [facility]-SCC-Left ear-Dr. RACHEL Hull     X-ray retrograde pyelogram  2013    Procedure: CYSTOSCOPY/URETEROSCOPY/STONE EXTRACTION;  Surgeon: Eduin Luna MD;  Location: [facility]     Family History   Problem Relation Age of Onset    Other (Other) Father         sepsis    Cancer Mother         lymphoma    Cancer Sister         thyroid CA     Social History     Tobacco Use    Smoking status: Former     Current packs/day: 0.00     Average packs/day: 0.2 packs/day for 8.0 years (1.6 ttl pk-yrs)     Types: Cigarettes     Start date: 1973     Quit date: 1981     Years since quittin.6    Smokeless tobacco: Never   Substance Use Topics    Alcohol use: Yes     Alcohol/week: 0.8 standard drinks of alcohol     Types: 1 Standard drinks or equivalent per week     Comment: socially/weekends beer,wine       SYSTEM Check if Review is Normal Check if Physical Exam is Normal If not normal, please explain:   HEENT [x ] [x ]    NECK & BACK [x  ] [x ]    HEART [x ] [x ]    LUNGS [x ] [x ]    ABDOMEN [x ] [x ]    UROGENITAL [ ] [ ]    EXTREMITIES [ ] [ ]    OTHER        [ x ] I have discussed the risks and benefits and alternatives with the patient/family.  They understand and agree to proceed with plan of care.  [ x ] I have reviewed the History and Physical done within the last 30 days.  Any changes noted above.    Robby Betts MD  8/29/2024  4:11 PM

## 2024-08-29 NOTE — ANESTHESIA PREPROCEDURE EVALUATION
Anesthesia PreOp Note    73 year old M presenting for EGD, pancreatic stent removal.    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    HPI:     Bruce R Salus is a 73 year old male who presents for preoperative consultation requested by: Robby Betts MD    Date of Surgery: 8/29/2024    Procedure(s):  ESOPHAGOGASTRODUODENOSCOPY (EGD) with stent removal  Indication: Presence of pancreatic duct stent    Relevant Problems   No relevant active problems       NPO:                         History Review:  Patient Active Problem List    Diagnosis Date Noted    History of nonmelanoma skin cancer 02/03/2022    BMI 28.0-28.9,adult 01/11/2021    Moderate episode of recurrent major depressive disorder (HCC) 01/30/2020    Elevated prostate specific antigen (PSA) 07/19/2018    BPH without obstruction/lower urinary tract symptoms 05/18/2017    Aorto-iliac atherosclerosis (HCC) 03/01/2017    History of kidney stones 04/05/2016    KAREN on CPAP 05/12/2014    Essential hypertension 04/03/2008       Past Medical History:    Calculus of kidney    Cancer (HCC)    bile duct cancer    Chemotherapy-induced nausea    Cholangiocarcinoma (HCC)    Esophageal reflux    High blood pressure    High cholesterol    Hypercholesterolemia    HYPERTENSION    Hypertension    Kidney disease    LFT elevation    Metastasis to peritoneal cavity (HCC)    Moderate episode of recurrent major depressive disorder (HCC)    KAREN (obstructive sleep apnea)    mild, AHI 10, O2 jas 84%, AutoPAP 8-20    Other and unspecified hyperlipidemia    OTHER DISEASES    pneumonia after flu    Personal history of antineoplastic chemotherapy    last infusion Aug 2023    Pneumonia    Reflux    Sleep apnea    Unspecified essential hypertension    Visual impairment       Past Surgical History:   Procedure Laterality Date    Cholecystectomy      Closed rx nose fx w stabilizatn  03/21/2013    Procedure: CLOSED REDUCTION  NASAL FRACTURE;  Surgeon: Lianna Luna MD;  Location: [facility]    Colonoscopy N/A 08/24/2021    Procedure: COLONOSCOPY, with polypectomy;  Surgeon: Gaurav Cruz MD;  Location: [facility]    Colonoscopy & polypectomy  10/07/2015    a small polyp was removed; [facility]    Colonoscopy,remv lesn,snare N/A 10/07/2015    Procedure: COLONOSCOPY, POSSIBLE BIOPSY, POSSIBLE POLYPECTOMY 49388;  Surgeon: Gaurav Cruz MD;  Location: [facility]    Cystoscopy,insert ureteral stent  04/08/2013    Procedure: CYSTOSCOPY/URETEROSCOPY/STONE EXTRACTION;  Surgeon: Eduin Luna MD;  Location: [facility]    Cystouretero w/lithotripsy  04/08/2013    Procedure: LITHOTRIPSY HOLMIUM LASER WITH CYSTOSCOPY;  Surgeon: Eduin Luna MD;  Location: [facility]    Cystouretro w/stone remove  04/08/2013    Procedure: CYSTO, WITH INSERTION OF STENT;  Surgeon: Eduin Luna MD;  Location: [facility]    Other surgical history      KIDNEY STONE REMOVED     Other surgical history      excision of left renal cyst     Other surgical history  11/15/2012    Flow US, RBUS    Other surgical history  12/05/2013    flow us- DR. Wilson    Other surgical history  2016    Cystoscopy    Skin surgery  09/15/2020    [facility]-Left ear-Dr. RCAHEL Hull     X-ray retrograde pyelogram  04/08/2013    Procedure: CYSTOSCOPY/URETEROSCOPY/STONE EXTRACTION;  Surgeon: Eduin Luna MD;  Location: [facility]       Medications Prior to Admission   Medication Sig Dispense Refill Last Dose    omeprazole 20 MG Oral Capsule Delayed Release Take 1 capsule (20 mg total) by mouth every morning before breakfast.       rivaroxaban (XARELTO) 20 MG Oral Tab Take 1 tablet (20 mg total) by mouth daily with food.       tamsulosin (FLOMAX) cap Take 1 capsule (0.4 mg total) by mouth nightly. 90 capsule 3     simvastatin 20 MG Oral Tab Take 1 tablet (20 mg total) by mouth nightly. At Bedtime 90 tablet 3      Benazepril HCl 20 MG Oral Tab Take 1 tablet (20 mg total) by mouth daily. 90 tablet 3     MULTI VITAMIN MENS OR TABS 1 TABLET DAILY       [] HYDROcodone-acetaminophen 5-325 MG Oral Tab Take 1-2 tablets by mouth every 6 (six) hours as needed for Pain. 10 tablet 0     predniSONE 20 MG Oral Tab Take 1 tablet (20 mg total) by mouth in the morning, at noon, and at bedtime.        No current Epic-ordered facility-administered medications on file.     No current Epic-ordered outpatient medications on file.       Allergies   Allergen Reactions    Chlorhexidine RASH     Clarissa body wipes       Family History   Problem Relation Age of Onset    Other (Other) Father         sepsis    Cancer Mother         lymphoma    Cancer Sister         thyroid CA     Social History     Socioeconomic History    Marital status:    Occupational History    Occupation: [employer]    Tobacco Use    Smoking status: Former     Current packs/day: 0.00     Average packs/day: 0.2 packs/day for 8.0 years (1.6 ttl pk-yrs)     Types: Cigarettes     Start date: 1973     Quit date: 1981     Years since quittin.6    Smokeless tobacco: Never   Vaping Use    Vaping status: Never Used   Substance and Sexual Activity    Alcohol use: Yes     Alcohol/week: 0.8 standard drinks of alcohol     Types: 1 Standard drinks or equivalent per week     Comment: socially/weekends beer,wine    Drug use: Yes     Types: Cannabis    Sexual activity: Yes     Partners: Female     Comment:     Other Topics Concern     Service No    Blood Transfusions No    Caffeine Concern Yes    Hobby Hazards No    Stress Concern No    Special Diet Yes    Exercise Yes    Seat Belt Yes    Self-Exams Yes       Available pre-op labs reviewed.  Lab Results   Component Value Date    WBC 7.4 2024    RBC 4.59 2024    HGB 13.7 2024    HCT 42.5 2024    MCV 92.6 2024    MCH 29.8 2024    MCHC 32.2 2024    RDW  15.5 07/11/2024    .0 (L) 07/11/2024     Lab Results   Component Value Date     07/11/2024    K 4.9 07/11/2024     07/11/2024    CO2 27.0 07/11/2024    BUN 24 (H) 07/11/2024    CREATSERUM 1.15 07/11/2024    GLU 98 07/11/2024    CA 9.4 07/11/2024          Vital Signs:  Body mass index is 28.31 kg/m².   height is 1.803 m (5' 11\") and weight is 92.1 kg (203 lb).   Vitals:    08/28/24 1315   Weight: 92.1 kg (203 lb)   Height: 1.803 m (5' 11\")        Anesthesia Evaluation      No history of anesthetic complications   Airway   Mallampati: II  TM distance: >3 FB  Neck ROM: full  Dental      Comment: Risks of oral and dental damage including but not limited to cut/bloody lips or gums, damage to or dislodgment of native teeth or prosthetics/implants discussed preoperatively, with patient expressing understanding and desire to proceed with anesthetic. Patient denies knowledge of any additional damage/disease/weakness of teeth not otherwise documented in pre-anesthetic evaluation.        Pulmonary - normal exam   (+) sleep apnea on CPAP  (-) COPD, asthma, recent URI  Cardiovascular - normal exam  Exercise tolerance: good  (+) hypertension    Neuro/Psych    (+)   depression    (-) seizures, TIA, CVA    GI/Hepatic/Renal    (+) GERD well controlled, liver disease (cholangiocarcinoma with mets)  (-) renal disease    Endo/Other - negative ROS   (-) diabetes mellitus, hypothyroidism, hyperthyroidism  Abdominal  - normal exam                 Anesthesia Plan:   ASA:  3  Plan:   MAC  Post-op Pain Management: Oral pain medication and IV analgesics  Informed Consent Plan and Risks Discussed With:  Patient and spouse      I have informed Reilly CESARIO Salus and/or legal guardian or family member of the nature of the anesthetic plan, benefits, risks including possible dental damage if relevant, major complications, and any alternative forms of anesthetic management.   All of the patient's questions were answered to the best of  my ability. The patient desires the anesthetic management as planned.  Bhaskar Ornelas MD  8/29/2024 2:47 PM  Present on Admission:  **None**

## 2024-08-30 NOTE — ANESTHESIA POSTPROCEDURE EVALUATION
Patient: Bruce R Salus    Procedure Summary       Date: 08/29/24 Room / Location: [facility] ENDOSCOPY 01 / [facility] ENDOSCOPY    Anesthesia Start: 1558 Anesthesia Stop: 1609    Procedure: ESOPHAGOGASTRODUODENOSCOPY (EGD) with stent removal Diagnosis:       Presence of pancreatic duct stent      (retained pancreatic stent)    Surgeons: Robby Betts MD Anesthesiologist: Bhaskar Ornelas MD    Anesthesia Type: MAC ASA Status: 3            Anesthesia Type: MAC    Vitals Value Taken Time   /67 08/29/24 1645   Temp 36 08/30/24 0649   Pulse 48 08/29/24 1648   Resp 12 08/29/24 1648   SpO2 95 % 08/29/24 1648   Vitals shown include unfiled device data.    [facility] AN Post Evaluation:   Patient Evaluated in PACU  Patient Participation: complete - patient participated  Level of Consciousness: awake and alert  Pain Score: 1  Pain Management: satisfactory to patient  Airway Patency:  Dental exam unchanged from preop  Yes    Nausea/Vomiting: none  Cardiovascular Status: blood pressure returned to baseline and hemodynamically stable  Respiratory Status: acceptable, nasal cannula, nonlabored ventilation and unassisted  Postoperative Hydration acceptable      Bhaskar Ornelas MD  8/30/2024 6:49 AM

## 2024-10-14 ENCOUNTER — ANESTHESIA (OUTPATIENT)
Dept: ENDOSCOPY | Facility: HOSPITAL | Age: 74
End: 2024-10-14
Payer: MEDICARE

## 2024-10-14 ENCOUNTER — HOSPITAL ENCOUNTER (OUTPATIENT)
Facility: HOSPITAL | Age: 74
Setting detail: HOSPITAL OUTPATIENT SURGERY
Discharge: HOME | End: 2024-10-14
Attending: INTERNAL MEDICINE | Admitting: INTERNAL MEDICINE
Payer: MEDICARE

## 2024-10-14 ENCOUNTER — ANESTHESIA EVENT (OUTPATIENT)
Dept: ENDOSCOPY | Facility: HOSPITAL | Age: 74
End: 2024-10-14
Payer: MEDICARE

## 2024-10-14 ENCOUNTER — APPOINTMENT (OUTPATIENT)
Dept: GENERAL RADIOLOGY | Facility: HOSPITAL | Age: 74
End: 2024-10-14
Attending: INTERNAL MEDICINE
Payer: MEDICARE

## 2024-10-14 VITALS
HEART RATE: 45 BPM | SYSTOLIC BLOOD PRESSURE: 140 MMHG | RESPIRATION RATE: 17 BRPM | TEMPERATURE: 98 F | BODY MASS INDEX: 27.11 KG/M2 | WEIGHT: 198 LBS | HEIGHT: 71.75 IN | DIASTOLIC BLOOD PRESSURE: 64 MMHG | OXYGEN SATURATION: 97 %

## 2024-10-14 PROCEDURE — 0F798DZ DILATION OF COMMON BILE DUCT WITH INTRALUMINAL DEVICE, VIA NATURAL OR ARTIFICIAL OPENING ENDOSCOPIC: ICD-10-PCS | Performed by: INTERNAL MEDICINE

## 2024-10-14 PROCEDURE — 74328 X-RAY BILE DUCT ENDOSCOPY: CPT | Performed by: INTERNAL MEDICINE

## 2024-10-14 DEVICE — COTTON-LEUNG BILIARY STENT
Type: IMPLANTABLE DEVICE | Site: BILIARY | Status: FUNCTIONAL
Brand: COTTON-LEUNG

## 2024-10-14 RX ORDER — NICOTINE POLACRILEX 4 MG
30 LOZENGE BUCCAL
Status: DISCONTINUED | OUTPATIENT
Start: 2024-10-14 | End: 2024-10-14

## 2024-10-14 RX ORDER — NICOTINE POLACRILEX 4 MG
15 LOZENGE BUCCAL
Status: DISCONTINUED | OUTPATIENT
Start: 2024-10-14 | End: 2024-10-14

## 2024-10-14 RX ORDER — CEFADROXIL 500 MG/1
500 CAPSULE ORAL DAILY
COMMUNITY
Start: 2024-10-08 | End: 2024-11-06

## 2024-10-14 RX ORDER — LEVOFLOXACIN 5 MG/ML
INJECTION, SOLUTION INTRAVENOUS
Status: DISCONTINUED
Start: 2024-10-14 | End: 2024-10-14

## 2024-10-14 RX ORDER — SODIUM CHLORIDE, SODIUM LACTATE, POTASSIUM CHLORIDE, CALCIUM CHLORIDE 600; 310; 30; 20 MG/100ML; MG/100ML; MG/100ML; MG/100ML
INJECTION, SOLUTION INTRAVENOUS CONTINUOUS
Status: DISCONTINUED | OUTPATIENT
Start: 2024-10-14 | End: 2024-10-14

## 2024-10-14 RX ORDER — METOCLOPRAMIDE HYDROCHLORIDE 5 MG/ML
10 INJECTION INTRAMUSCULAR; INTRAVENOUS EVERY 8 HOURS PRN
Status: DISCONTINUED | OUTPATIENT
Start: 2024-10-14 | End: 2024-10-14

## 2024-10-14 RX ORDER — LIDOCAINE HYDROCHLORIDE 10 MG/ML
INJECTION, SOLUTION EPIDURAL; INFILTRATION; INTRACAUDAL; PERINEURAL AS NEEDED
Status: DISCONTINUED | OUTPATIENT
Start: 2024-10-14 | End: 2024-10-14 | Stop reason: SURG

## 2024-10-14 RX ORDER — INDOMETHACIN 100 MG
100 SUPPOSITORY, RECTAL RECTAL ONCE
Status: DISCONTINUED | OUTPATIENT
Start: 2024-10-14 | End: 2024-10-14

## 2024-10-14 RX ORDER — SODIUM CHLORIDE, SODIUM LACTATE, POTASSIUM CHLORIDE, CALCIUM CHLORIDE 600; 310; 30; 20 MG/100ML; MG/100ML; MG/100ML; MG/100ML
100 INJECTION, SOLUTION INTRAVENOUS CONTINUOUS
Status: DISCONTINUED | OUTPATIENT
Start: 2024-10-14 | End: 2024-10-14

## 2024-10-14 RX ORDER — NALOXONE HYDROCHLORIDE 0.4 MG/ML
0.08 INJECTION, SOLUTION INTRAMUSCULAR; INTRAVENOUS; SUBCUTANEOUS AS NEEDED
Status: DISCONTINUED | OUTPATIENT
Start: 2024-10-14 | End: 2024-10-14

## 2024-10-14 RX ORDER — HYDROMORPHONE HYDROCHLORIDE 1 MG/ML
0.4 INJECTION, SOLUTION INTRAMUSCULAR; INTRAVENOUS; SUBCUTANEOUS EVERY 5 MIN PRN
Status: DISCONTINUED | OUTPATIENT
Start: 2024-10-14 | End: 2024-10-14

## 2024-10-14 RX ORDER — LEVOFLOXACIN 5 MG/ML
500 INJECTION, SOLUTION INTRAVENOUS ONCE
Status: COMPLETED | OUTPATIENT
Start: 2024-10-14 | End: 2024-10-14

## 2024-10-14 RX ORDER — INDOMETHACIN 100 MG
SUPPOSITORY, RECTAL RECTAL
Status: DISCONTINUED
Start: 2024-10-14 | End: 2024-10-14

## 2024-10-14 RX ORDER — HYDROMORPHONE HYDROCHLORIDE 1 MG/ML
0.6 INJECTION, SOLUTION INTRAMUSCULAR; INTRAVENOUS; SUBCUTANEOUS EVERY 5 MIN PRN
Status: DISCONTINUED | OUTPATIENT
Start: 2024-10-14 | End: 2024-10-14

## 2024-10-14 RX ORDER — ONDANSETRON 2 MG/ML
4 INJECTION INTRAMUSCULAR; INTRAVENOUS EVERY 6 HOURS PRN
Status: DISCONTINUED | OUTPATIENT
Start: 2024-10-14 | End: 2024-10-14

## 2024-10-14 RX ORDER — INDOMETHACIN 100 MG
SUPPOSITORY, RECTAL RECTAL
Status: DISCONTINUED
Start: 2024-10-14 | End: 2024-10-14 | Stop reason: WASHOUT

## 2024-10-14 RX ORDER — HYDROMORPHONE HYDROCHLORIDE 1 MG/ML
0.2 INJECTION, SOLUTION INTRAMUSCULAR; INTRAVENOUS; SUBCUTANEOUS EVERY 5 MIN PRN
Status: DISCONTINUED | OUTPATIENT
Start: 2024-10-14 | End: 2024-10-14

## 2024-10-14 RX ORDER — DEXTROSE MONOHYDRATE 25 G/50ML
50 INJECTION, SOLUTION INTRAVENOUS
Status: DISCONTINUED | OUTPATIENT
Start: 2024-10-14 | End: 2024-10-14

## 2024-10-14 RX ADMIN — LEVOFLOXACIN: 5 INJECTION, SOLUTION INTRAVENOUS at 13:42:00

## 2024-10-14 RX ADMIN — SODIUM CHLORIDE, SODIUM LACTATE, POTASSIUM CHLORIDE, CALCIUM CHLORIDE: 600; 310; 30; 20 INJECTION, SOLUTION INTRAVENOUS at 13:42:00

## 2024-10-14 RX ADMIN — LEVOFLOXACIN 500 MG: 5 INJECTION, SOLUTION INTRAVENOUS at 13:08:00

## 2024-10-14 RX ADMIN — LIDOCAINE HYDROCHLORIDE 25 MG: 10 INJECTION, SOLUTION EPIDURAL; INFILTRATION; INTRACAUDAL; PERINEURAL at 13:09:00

## 2024-10-14 RX ADMIN — SODIUM CHLORIDE, SODIUM LACTATE, POTASSIUM CHLORIDE, CALCIUM CHLORIDE: 600; 310; 30; 20 INJECTION, SOLUTION INTRAVENOUS at 12:53:00

## 2024-10-14 NOTE — OPERATIVE REPORT
[facility] OPERATIVE REPORT   PATIENT NAME: Bruce R Salus  MRN: [id number]  DATE OF OPERATION: 10/14/2024  PREOPERATIVE DIAGNOSIS: history of metastatic hilar cholangiocarcinoma with omental metastasis s/p uncovered metal stent into the R intrahepatic duct (July 2024); was well until recently, admitted to [facility] for Klebsiella pneumonia suspicious for acute cholangitis.  MRCP to my review showed dilated R intrahepatic ducts proximal to the previously placed metal stent  POSTOPERATIVE DIAGNOSIS:    1.  High grade 1 cm long stricture in hepatic hilum within the metal stent with upstream biliary ductal dilation  2.  S/p balloon dilation, placement of plastic 10 fr x 12 cm biliary stent with marked improvement of biliary drainage  PROCEDURE PERFORMED: Endoscopic Retrograde Cholangiopancreatoscopy with balloon dilation and stent placement  SEDATION MEDICATIONS: MAC  PREOPERATIVE MEDS:  Levofloxacin 500 mg IVPB;  500cc lactated Ringer's solution IV  INTRAPROCEDURE MEDS:   PREPROCEDURE ASSESSMENT: The indication for this procedure is to assess for stricture. The patient was identified by myself and nursing staff in the exam room. Informed consent was obtained. The patient was seen in clinic and a full H&P was obtained. On brief physical examination, airway is patent. Chest is clear. Heart has regular rate and rhythm. Abdomen is soft, nontender with good bowel sounds. A medication list was taken by nursing today and reviewed by myself. The patient is an ASA grade 2.   PROCEDURE NOTE: The procedure was completed without difficulty. The patient tolerated the procedure well.   The side-viewing duodenoscope was introduced into the esophagus and advanced to the 2nd portion.  Ampulla was visualized and appeared normal. Previously placed metal stent was noted. Cholangiogram was performed with 9-12mm extraction balloon; there was a high grade stricture in the proximal end of the stent at the level of the hepatic hilum. Stricture  was 1cm in length. Balloon was not able to be advanced through. A 4mm and then 6mm balloon was used to dilate the stricture. A plastic 10fr x 12cm stent was placed so the proximal end was above the stricture.  Excellent biliary drainage was noted. Scope was withdrawn from the patient and patient tolerated the procedure well.  FINDINGS   High grade stricture within the stent was noted; it was dilated and stent was placed across the stricture  Cholangitis   RECOMMENDATIONS: will repeat ERCP in 3 months to replace the stent  DISCHARGE:  On discharge, the patient was given an after-visit summary detailing the procedure, findings, followup plans, and an updated medication list.     Thank you very much for the consultation.  I really appreciate it.    Robby Betts MD

## 2024-10-14 NOTE — ANESTHESIA PREPROCEDURE EVALUATION
PRE-OP EVALUATION    Patient Name: Bruce R Salus    Admit Diagnosis: CHOLANGIOCARCINOMA; FEVER UNSPECIFIED FEVER TYPE    Pre-op Diagnosis: CHOLANGIOCARCINOMA; FEVER UNSPECIFIED FEVER TYPE    ENDOSCOPIC RETROGRADE CHOLANGIOPANCREATOGRAPHY (ERCP) WITH FLUOROSCOPY    Anesthesia Procedure: ENDOSCOPIC RETROGRADE CHOLANGIOPANCREATOGRAPHY (ERCP) WITH FLUOROSCOPY    Surgeons and Role:     * Robby Betts MD - Primary    Pre-op vitals reviewed.        Body mass index is 27.04 kg/m².    Current medications reviewed.  Hospital Medications:  No current facility-administered medications on file as of 10/14/2024.       Outpatient Medications:   Prescriptions Prior to Admission[1]    Allergies: Chlorhexidine      Anesthesia Evaluation    Patient summary reviewed.    Anesthetic Complications           GI/Hepatic/Renal      (+) GERD                           Cardiovascular                  (+) hypertension                                     Endo/Other                                  Pulmonary                    (+) sleep apnea       Neuro/Psych                                      Past Surgical History:   Procedure Laterality Date    Cholecystectomy      Closed rx nose fx w stabilizatn  03/21/2013    Procedure: CLOSED REDUCTION NASAL FRACTURE;  Surgeon: Lianna Luna MD;  Location: [facility]    Colonoscopy N/A 08/24/2021    Procedure: COLONOSCOPY, with polypectomy;  Surgeon: Gaurav Cruz MD;  Location: [facility]    Colonoscopy & polypectomy  10/07/2015    a small polyp was removed; [facility]    Colonoscopy,remv lesn,snare N/A 10/07/2015    Procedure: COLONOSCOPY, POSSIBLE BIOPSY, POSSIBLE POLYPECTOMY 49569;  Surgeon: Gaurav Cruz MD;  Location: [facility]    Cystoscopy,insert ureteral stent  04/08/2013    Procedure: CYSTOSCOPY/URETEROSCOPY/STONE EXTRACTION;  Surgeon: Eduin Luna MD;  Location: [facility]    Cystouretero w/lithotripsy  04/08/2013    Procedure:  LITHOTRIPSY HOLMIUM LASER WITH CYSTOSCOPY;  Surgeon: Eduin Luna MD;  Location: [facility]    Cystouretro w/stone remove  2013    Procedure: CYSTO, WITH INSERTION OF STENT;  Surgeon: Eduin Luna MD;  Location: [facility]    Other surgical history      KIDNEY STONE REMOVED     Other surgical history      excision of left renal cyst     Other surgical history  11/15/2012    Flow US, RBUS    Other surgical history  2013    flow - DR. Wilson    Other surgical history  2016    Cystoscopy    Skin surgery  09/15/2020    [facility]-Left ear-Dr. RACHEL Hull     X-ray retrograde pyelogram  2013    Procedure: CYSTOSCOPY/URETEROSCOPY/STONE EXTRACTION;  Surgeon: Eduin Luna MD;  Location: [facility]     Social History     Socioeconomic History    Marital status:    Occupational History    Occupation: [employer]    Tobacco Use    Smoking status: Former     Current packs/day: 0.00     Average packs/day: 0.2 packs/day for 8.0 years (1.6 ttl pk-yrs)     Types: Cigarettes     Start date: 1973     Quit date: 1981     Years since quittin.8    Smokeless tobacco: Never   Vaping Use    Vaping status: Never Used   Substance and Sexual Activity    Alcohol use: Yes     Alcohol/week: 0.8 standard drinks of alcohol     Types: 1 Standard drinks or equivalent per week     Comment: rare    Drug use: Not Currently     Types: Cannabis    Sexual activity: Yes     Partners: Female     Comment:     Other Topics Concern     Service No    Blood Transfusions No    Caffeine Concern Yes    Hobby Hazards No    Stress Concern No    Special Diet Yes    Exercise Yes    Seat Belt Yes    Self-Exams Yes     History   Drug Use Unknown     Available pre-op labs reviewed.               Airway      Mallampati: I  Mouth opening: >3 FB  TM distance: > 6 cm  Neck ROM: full Cardiovascular    Cardiovascular exam normal.  Rhythm: regular  Rate: normal      Dental             Pulmonary    Pulmonary exam normal.                 Other findings              ASA: 2   Plan: MAC  NPO status verified and patient meets guidelines.          Plan/risks discussed with: patient and significant other                Present on Admission:  **None**             [1]   Medications Prior to Admission   Medication Sig Dispense Refill Last Dose/Taking    omeprazole 20 MG Oral Capsule Delayed Release Take 1 capsule (20 mg total) by mouth every morning before breakfast.   Taking    rivaroxaban (XARELTO) 20 MG Oral Tab Take 1 tablet (20 mg total) by mouth daily with food.   Taking    tamsulosin (FLOMAX) cap Take 1 capsule (0.4 mg total) by mouth nightly. 90 capsule 3 Taking    simvastatin 20 MG Oral Tab Take 1 tablet (20 mg total) by mouth nightly. At Bedtime 90 tablet 3 Taking    Benazepril HCl 20 MG Oral Tab Take 1 tablet (20 mg total) by mouth daily. 90 tablet 3 Taking    MULTI VITAMIN MENS OR TABS 1 TABLET DAILY   Taking    [] HYDROcodone-acetaminophen 5-325 MG Oral Tab Take 1-2 tablets by mouth every 6 (six) hours as needed for Pain. 10 tablet 0     predniSONE 20 MG Oral Tab Take 1 tablet (20 mg total) by mouth in the morning, at noon, and at bedtime.

## 2024-10-14 NOTE — ANESTHESIA POSTPROCEDURE EVALUATION
[facility]    Bruce R Salus Patient Status:  Hospital Outpatient Surgery   Age/Gender 73 year old male MRN [id number]   Location [facility] ENDOSCOPY PAIN CENTER Attending Robby Betts MD   Hosp Day # 0 PCP Queta Alanis MD       Anesthesia Post-op Note    ENDOSCOPIC RETROGRADE CHOLANGIOPANCREATOGRAPHY (ERCP) WITH DILATION 4MM, 6MM, SOHENDRA 8.5,10, BILIARY STENT PLACEMENT, BALLOON SWEEP FLUOROSCOPY    Procedure Summary       Date: 10/14/24 Room / Location:  ENDOSCOPY 03 / [facility] ENDOSCOPY    Anesthesia Start: 1303 Anesthesia Stop: 1344    Procedure: ENDOSCOPIC RETROGRADE CHOLANGIOPANCREATOGRAPHY (ERCP) WITH DILATION 4MM, 6MM, SOHENDRA 8.5,10, BILIARY STENT PLACEMENT, BALLOON SWEEP FLUOROSCOPY Diagnosis: (BILIARY STRICTURE)    Surgeons: Robby Betts MD Anesthesiologist: Shaggy Quiñones MD    Anesthesia Type: MAC ASA Status: 2            Anesthesia Type: MAC    Vitals Value Taken Time   /51 10/14/24 1344   Temp 98.3 °F (36.8 °C) 10/14/24 1344   Pulse 58 10/14/24 1344   Resp 16 10/14/24 1344   SpO2 96 % 10/14/24 1344       Patient Location: Endoscopy    Anesthesia Type: MAC    Airway Patency: patent    Postop Pain Control: adequate    Mental Status: mildly sedated but able to meaningfully participate in the post-anesthesia evaluation    Nausea/Vomiting: none    Cardiopulmonary/Hydration status: stable euvolemic    Complications: no apparent anesthesia related complications    Postop vital signs: stable    Dental Exam: Unchanged from Preop    Patient to be discharged home when criteria met.

## 2024-10-14 NOTE — DISCHARGE INSTRUCTIONS
Home Care Instructions for Endoscopic Retrograde Cholangiopancreatography with Sedation    Diet:  - Resume your regular diet as tolerated unless otherwise instructed.  - Start with light meals to minimize bloating.  - Do not drink alcohol today.    Medication:  - If you have questions about resuming your normal medications, please contact your Primary Care Physician.    Activities:  - Take it easy today. Do not return to work today.  - Do not drive today.  - Do not operate any machinery today (including kitchen equipment).    Endoscopic Retrograde Cholangiopancreatography:  - You may have a sore throat for 2-3 days following the exam. This is normal. Gargling with warm salt water (1/2 tsp salt to 1 glass warm water) or using throat lozenges will help.  - If you experience any sharp pain in your neck, abdomen or chest, vomiting of blood, oral temperature over 100 degrees Fahrenheit, light-headedness or dizziness, or any other problems, contact your doctor.    **If unable to reach your doctor, please go to the [facility] Emergency Room**    - Your referring physician will receive a full report of your examination.  - If you do not hear from your doctor's office within two weeks of your biopsy, please call them for your results.    You may be able to see your laboratory results in [place] between 4 and 7 business days.  In some cases, your physician may not have viewed the results before they are released to [place].  If you have questions regarding your results contact the physician who ordered the test/exam by phone or via [place] by choosing \"Ask a Medical Question.\"

## 2024-10-14 NOTE — H&P
History & Physical Examination    Patient Name: Bruce R Salus  MRN: [id number]  [facility]: [id number]  YOB: 1950    Diagnosis: CHOLANGIOCARCINOMA; FEVER UNSPECIFIED FEVER TYPE      Present Illness:  Bruce R Salus is a 73 year old male is here CHOLANGIOCARCINOMA; FEVER UNSPECIFIED FEVER TYPE.    Body mass index is 27.04 kg/m².    Past Medical History:    Calculus of kidney    Cancer (HCC)    bile duct cancer    Chemotherapy-induced nausea    Cholangiocarcinoma (HCC)    Deep vein thrombosis (HCC)    port cath had clot    Esophageal reflux    High blood pressure    High cholesterol    History of recent hospitalization    10 days per pt at [facility]- had high fever, found sepsis - treated w antibiotics/ no fever now    Hypercholesterolemia    HYPERTENSION    Hypertension    Kidney disease    LFT elevation    Metastasis to peritoneal cavity (HCC)    Moderate episode of recurrent major depressive disorder (HCC)    KAREN (obstructive sleep apnea)    mild, AHI 10, O2 jas 84%, AutoPAP 8-20    Other and unspecified hyperlipidemia    OTHER DISEASES    pneumonia after flu    Personal history of antineoplastic chemotherapy    last infusion sep 9, 2024    Pneumonia    Reflux    Sleep apnea    cpap    Unspecified essential hypertension    Visual impairment       Procedure: ERCP    Physician Pre-Sedation Assessment    Pre-Sedation Assessment:    Sedation History: Airway Assessed    ASA Classification: 2. Patient with mild systemic disease    Cardiac:    Respiratory:    Abdomen:      Plan: MAC        Current Facility-Administered Medications   Medication Dose Route Frequency    lactated ringers infusion   Intravenous Continuous    lactated ringers infusion  100 mL/hr Intravenous Continuous    indomethacin (Indocin) 100 MG rectal suppository 100 mg  100 mg Rectal Once    levoFLOXacin in dextrose 5% (Levaquin) 500 mg/100mL IVPB premix 500 mg  500 mg Intravenous Once    levoFLOXacin in dextrose 5% (Levaquin) 500 mg/100mL IVPB premix         indomethacin (Indocin) 100 MG rectal suppository           Allergies: Allergies[1]    Past Surgical History:   Procedure Laterality Date    Cholecystectomy      Closed rx nose fx w stabilizatn  03/21/2013    Procedure: CLOSED REDUCTION NASAL FRACTURE;  Surgeon: Lianna Luna MD;  Location: [facility]    Colonoscopy N/A 08/24/2021    Procedure: COLONOSCOPY, with polypectomy;  Surgeon: Gaurav Cruz MD;  Location: [facility]    Colonoscopy & polypectomy  10/07/2015    a small polyp was removed; [facility]    Colonoscopy,remv lesn,snare N/A 10/07/2015    Procedure: COLONOSCOPY, POSSIBLE BIOPSY, POSSIBLE POLYPECTOMY 95338;  Surgeon: Gaurav Cruz MD;  Location: [facility]    Cystoscopy,insert ureteral stent  04/08/2013    Procedure: CYSTOSCOPY/URETEROSCOPY/STONE EXTRACTION;  Surgeon: Eduin Luna MD;  Location: [facility]    Cystouretero w/lithotripsy  04/08/2013    Procedure: LITHOTRIPSY HOLMIUM LASER WITH CYSTOSCOPY;  Surgeon: Eduin Luna MD;  Location: [facility]    Cystouretro w/stone remove  04/08/2013    Procedure: CYSTO, WITH INSERTION OF STENT;  Surgeon: Eduin Luna MD;  Location: [facility]    Other surgical history      KIDNEY STONE REMOVED     Other surgical history      excision of left renal cyst     Other surgical history  11/15/2012    Flow US, RBUS    Other surgical history  12/05/2013    flow us- DR. Wilson    Other surgical history  2016    Cystoscopy    Skin surgery  09/15/2020    [facility]-Dr. RACHEL Hull     X-ray retrograde pyelogram  04/08/2013    Procedure: CYSTOSCOPY/URETEROSCOPY/STONE EXTRACTION;  Surgeon: Eduin Luna MD;  Location: [facility]     Family History   Problem Relation Age of Onset    Other (Other) Father         sepsis    Cancer Mother         lymphoma    Cancer Sister         thyroid CA     Social History     Tobacco Use    Smoking status: Former     Current packs/day: 0.00      Average packs/day: 0.2 packs/day for 8.0 years (1.6 ttl pk-yrs)     Types: Cigarettes     Start date: 1973     Quit date: 1981     Years since quittin.8    Smokeless tobacco: Never   Substance Use Topics    Alcohol use: Yes     Alcohol/week: 0.8 standard drinks of alcohol     Types: 1 Standard drinks or equivalent per week     Comment: rare       SYSTEM Check if Review is Normal Check if Physical Exam is Normal If not normal, please explain:   HEENT [x ] [x ]    NECK & BACK [x ] [x ]    HEART [x ] [x ]    LUNGS [x ] [x ]    ABDOMEN [x ] [x ]    UROGENITAL [ ] [ ]    EXTREMITIES [ ] [ ]    OTHER        [ x ] I have discussed the risks and benefits and alternatives with the patient/family.  They understand and agree to proceed with plan of care.  [ x ] I have reviewed the History and Physical done within the last 30 days.  Any changes noted above.    Robby Betts MD  10/14/2024  12:52 PM             [1]   Allergies  Allergen Reactions    Chlorhexidine RASH     Clarissa body wipes

## 2025-02-12 RX ORDER — CEFADROXIL 500 MG/1
CAPSULE ORAL DAILY
COMMUNITY

## 2025-02-23 ENCOUNTER — ANESTHESIA EVENT (OUTPATIENT)
Dept: ENDOSCOPY | Facility: HOSPITAL | Age: 75
End: 2025-02-23
Payer: MEDICARE

## 2025-02-24 ENCOUNTER — HOSPITAL ENCOUNTER (OUTPATIENT)
Facility: HOSPITAL | Age: 75
Setting detail: HOSPITAL OUTPATIENT SURGERY
Discharge: HOME | End: 2025-02-24
Attending: INTERNAL MEDICINE | Admitting: INTERNAL MEDICINE
Payer: MEDICARE

## 2025-02-24 ENCOUNTER — APPOINTMENT (OUTPATIENT)
Dept: GENERAL RADIOLOGY | Facility: HOSPITAL | Age: 75
End: 2025-02-24
Attending: INTERNAL MEDICINE
Payer: MEDICARE

## 2025-02-24 ENCOUNTER — ANESTHESIA (OUTPATIENT)
Dept: ENDOSCOPY | Facility: HOSPITAL | Age: 75
End: 2025-02-24
Payer: MEDICARE

## 2025-02-24 VITALS
OXYGEN SATURATION: 95 % | SYSTOLIC BLOOD PRESSURE: 125 MMHG | WEIGHT: 205 LBS | TEMPERATURE: 98 F | BODY MASS INDEX: 28.07 KG/M2 | HEART RATE: 51 BPM | HEIGHT: 71.5 IN | DIASTOLIC BLOOD PRESSURE: 59 MMHG | RESPIRATION RATE: 15 BRPM

## 2025-02-24 PROCEDURE — 0FPB8DZ REMOVAL OF INTRALUMINAL DEVICE FROM HEPATOBILIARY DUCT, VIA NATURAL OR ARTIFICIAL OPENING ENDOSCOPIC: ICD-10-PCS | Performed by: INTERNAL MEDICINE

## 2025-02-24 PROCEDURE — 74328 X-RAY BILE DUCT ENDOSCOPY: CPT | Performed by: INTERNAL MEDICINE

## 2025-02-24 PROCEDURE — BF502ZZ OTHER IMAGING OF BILE DUCTS USING FLUORESCING AGENT: ICD-10-PCS | Performed by: INTERNAL MEDICINE

## 2025-02-24 PROCEDURE — 0FC98ZZ EXTIRPATION OF MATTER FROM COMMON BILE DUCT, VIA NATURAL OR ARTIFICIAL OPENING ENDOSCOPIC: ICD-10-PCS | Performed by: INTERNAL MEDICINE

## 2025-02-24 PROCEDURE — 0FB78ZZ EXCISION OF COMMON HEPATIC DUCT, VIA NATURAL OR ARTIFICIAL OPENING ENDOSCOPIC: ICD-10-PCS | Performed by: INTERNAL MEDICINE

## 2025-02-24 PROCEDURE — 88305 TISSUE EXAM BY PATHOLOGIST: CPT | Performed by: INTERNAL MEDICINE

## 2025-02-24 PROCEDURE — 88342 IMHCHEM/IMCYTCHM 1ST ANTB: CPT | Performed by: INTERNAL MEDICINE

## 2025-02-24 RX ORDER — INDOMETHACIN 100 MG
SUPPOSITORY, RECTAL RECTAL
Status: DISCONTINUED
Start: 2025-02-24 | End: 2025-02-24

## 2025-02-24 RX ORDER — SODIUM CHLORIDE, SODIUM LACTATE, POTASSIUM CHLORIDE, CALCIUM CHLORIDE 600; 310; 30; 20 MG/100ML; MG/100ML; MG/100ML; MG/100ML
100 INJECTION, SOLUTION INTRAVENOUS CONTINUOUS
Status: DISCONTINUED | OUTPATIENT
Start: 2025-02-24 | End: 2025-02-24

## 2025-02-24 RX ORDER — PHENYLEPHRINE HCL 10 MG/ML
VIAL (ML) INJECTION AS NEEDED
Status: DISCONTINUED | OUTPATIENT
Start: 2025-02-24 | End: 2025-02-24 | Stop reason: SURG

## 2025-02-24 RX ORDER — INDOMETHACIN 100 MG
100 SUPPOSITORY, RECTAL RECTAL ONCE
Status: DISCONTINUED | OUTPATIENT
Start: 2025-02-24 | End: 2025-02-24

## 2025-02-24 RX ORDER — LIDOCAINE HYDROCHLORIDE 10 MG/ML
INJECTION, SOLUTION EPIDURAL; INFILTRATION; INTRACAUDAL; PERINEURAL AS NEEDED
Status: DISCONTINUED | OUTPATIENT
Start: 2025-02-24 | End: 2025-02-24 | Stop reason: SURG

## 2025-02-24 RX ORDER — SODIUM CHLORIDE, SODIUM LACTATE, POTASSIUM CHLORIDE, CALCIUM CHLORIDE 600; 310; 30; 20 MG/100ML; MG/100ML; MG/100ML; MG/100ML
INJECTION, SOLUTION INTRAVENOUS CONTINUOUS
Status: DISCONTINUED | OUTPATIENT
Start: 2025-02-24 | End: 2025-02-24

## 2025-02-24 RX ORDER — SODIUM CHLORIDE, SODIUM LACTATE, POTASSIUM CHLORIDE, CALCIUM CHLORIDE 600; 310; 30; 20 MG/100ML; MG/100ML; MG/100ML; MG/100ML
INJECTION, SOLUTION INTRAVENOUS CONTINUOUS PRN
Status: DISCONTINUED | OUTPATIENT
Start: 2025-02-24 | End: 2025-02-24 | Stop reason: SURG

## 2025-02-24 RX ADMIN — LIDOCAINE HYDROCHLORIDE 25 MG: 10 INJECTION, SOLUTION EPIDURAL; INFILTRATION; INTRACAUDAL; PERINEURAL at 11:30:00

## 2025-02-24 RX ADMIN — PHENYLEPHRINE HCL 100 MCG: 10 MG/ML VIAL (ML) INJECTION at 12:02:00

## 2025-02-24 RX ADMIN — SODIUM CHLORIDE, SODIUM LACTATE, POTASSIUM CHLORIDE, CALCIUM CHLORIDE: 600; 310; 30; 20 INJECTION, SOLUTION INTRAVENOUS at 11:26:00

## 2025-02-24 RX ADMIN — PHENYLEPHRINE HCL 100 MCG: 10 MG/ML VIAL (ML) INJECTION at 11:58:00

## 2025-02-24 NOTE — DISCHARGE INSTRUCTIONS
Home Care Instructions ENDOSCOPIC RETROGRADE CHOLANGIOPANCREATOGRAPHY (ERCP)  with Sedation    Diet:  - Resume your regular diet as tolerated unless otherwise instructed.  - Start with light meals to minimize bloating.  - Do not drink alcohol today.    Medication:  - If you have questions about resuming your normal medications, please contact your Primary Care Physician.    Activities:  - Take it easy today. Do not return to work today.  - Do not drive today.  - Do not operate any machinery today (including kitchen equipment).    ENDOSCOPIC RETROGRADE CHOLANGIOPANCREATOGRAPHY (ERCP) :  - You may have a sore throat for 2-3 days following the exam. This is normal. Gargling with warm salt water (1/2 tsp salt to 1 glass warm water) or using throat lozenges will help.  - If you experience any sharp pain in your neck, abdomen or chest, vomiting of blood, oral temperature over 100 degrees Fahrenheit, light-headedness or dizziness, or any other problems, contact your doctor.    **If unable to reach your doctor, please go to the [facility] Emergency Room**    - Your referring physician will receive a full report of your examination.  - If you do not hear from your doctor's office within two weeks of your biopsy, please call them for your results.

## 2025-02-24 NOTE — OPERATIVE REPORT
[facility] OPERATIVE REPORT   PATIENT NAME: Bruce R Salus  MRN: [id number]  DATE OF OPERATION: 2/24/2025  PREOPERATIVE DIAGNOSIS: history of cholangiocarcinoma s/p R hepatic duct metal stent placement for high grade stricture in common hepatic duct July 2024; repeat episode of sepsis presumed to be cholangitis with normal liver tests Ayg 2024 (partially occluded metal stent s/p sweeping and clearing); readmission Oct 2024 at [facility] for Klebsiella sepsis suspicious for biliary stricture at level of initial stricture (MRCP showing increased R intrahepatic ductal dilation) - ERCP showed CHD stricture s/p 10fr plastic stent; here for repeat ERCP to examine biliary stricture  POSTOPERATIVE DIAGNOSIS:    1.  Removal of plastic stent and small stones c/w choledocholithiasis     2.  Mild stenosis of CHD (1cm long)    3.  Cholangioscopy showed mild stenosis with CHD with nodular tissue s/p biopsies   PROCEDURE PERFORMED: Endoscopic Retrograde Cholangiopancreatoscopy with removal of plastic biliary stent and cholangioscopy   SEDATION MEDICATIONS: MAC  PREOPERATIVE MEDS: Unasyn 3gm IVPB;  500cc lactated Ringer's solution IV  INTRAPROCEDURE MEDS:    PREPROCEDURE ASSESSMENT: The indication for this procedure is to assess for reevaluation of the biliary stricture. The patient was identified by myself and nursing staff in the exam room. Informed consent was obtained. The patient was seen in clinic and a full H&P was obtained. On brief physical examination, airway is patent. Chest is clear. Heart has regular rate and rhythm. Abdomen is soft, nontender with good bowel sounds. A medication list was taken by nursing today and reviewed by myself. The patient is an ASA grade 2.   PROCEDURE NOTE: The procedure was completed without difficulty. The patient tolerated the procedure well.   The side-viewing duodenoscope was introduced into the esophagus and advanced to the 2nd portion.  Previous metal stent was visible for at least 2 cm  within the duodenum.  Previous plastic stent was seen coming through the metal stent and was removed through the scope with a snare.  Biliary cannulation was achieved easily using a 9-12 mm extraction balloon with a long 035\" guidewire.  Occlusion cholangiogram demonstrated filling defects within the stent and a 1 cm hepatic duct stricture.  There was upstream right intrahepatic ductal dilation which was mild.  The stent below the stricture was swept with a balloon sweep and multiple small stone fragments were removed with sludge.  The balloon was passed proximal to the stricture and there was resistance to a balloon pull-through consistent with a stricture.  The short remnant of left main intrahepatic was noted with no filling of the left intrahepatic ducts consistent with complete occlusion of the left intrahepatic ducts by tumor.  To evaluate the etiology and patency of the stricture, cholangioscopy was performed using spLuminus Deviceslass DS scope.  The scope was advanced over the guidewire to the level of the stricture and there was mild narrowing noted.  This narrowing did not impede the passage of a 10 Haitian spy scope.  There was some nodularity seen in the area of stricture likely secondary to primary tumor.  Several biopsies were taken using spybite forceps.  Adequate tissue was obtained.  There was excellent biliary drainage and no need for replacement of the plastic stent.  No contrast was injected into the pancreatic duct. Scope was withdrawn from the patient and patient tolerated the procedure well.  FINDINGS   Mild stenosis of the common hepatic duct likely from primary tumor.  This did not impede the passage of a large spy scope  RECOMMENDATIONS: Will follow patient's clinical condition and liver enzymes periodically.  Will see the patient in the office in 6 months.  Advised liver enzymes checks every 3 to 6 months,  DISCHARGE:  On discharge, the patient was given an after-visit summary detailing the procedure,  findings, followup plans, and an updated medication list.     Thank you very much for the consultation.  I really appreciate it.    Robby Betts MD         \

## 2025-02-24 NOTE — ANESTHESIA PREPROCEDURE EVALUATION
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  Temp: 98.3 °F (36.8 °C)  Pulse: 48  Resp: 20  BP: 136/64  SpO2: 100 %  Body mass index is 28.19 kg/m².    Current medications reviewed.  Hospital Medications:   lactated ringers infusion   Intravenous Continuous    lactated ringers IV bolus 1,000 mL  1,000 mL Intravenous Once    Followed by    lactated ringers infusion  100 mL/hr Intravenous Continuous    indomethacin (Indocin) 100 MG rectal suppository 100 mg  100 mg Rectal Once    ampicillin-sulbactam (Unasyn) 3 g in sodium chloride 0.9% 100mL IVPB-ADD  3 g Intravenous Once    indomethacin (Indocin) 100 MG rectal suppository           Outpatient Medications:   Prescriptions Prior to Admission[1]    Allergies: Chlorhexidine      Anesthesia Evaluation        Anesthetic Complications           GI/Hepatic/Renal      (+) GERD                           Cardiovascular  Comment: TTE 2024 with normal biventricular fxn, mild-mod MR                (+) hypertension                                     Endo/Other                                  Pulmonary                    (+) sleep apnea       Neuro/Psych                              metastatic cholangiocarcinoma on palliative chemo    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        Past Surgical History:   Procedure Laterality Date    Cholecystectomy      Closed rx nose fx w stabilizatn  03/21/2013    Procedure: CLOSED REDUCTION NASAL FRACTURE;  Surgeon: Lianna Luna MD;  Location: [facility] SURGICAL [facility]    Colonoscopy N/A 08/24/2021     Procedure: COLONOSCOPY, with polypectomy;  Surgeon: Gaurav Cruz MD;  Location: [facility]    Colonoscopy & polypectomy  10/07/2015    a small polyp was removed; [facility]    Colonoscopy,remv lesn,snare N/A 10/07/2015    Procedure: COLONOSCOPY, POSSIBLE BIOPSY, POSSIBLE POLYPECTOMY 62658;  Surgeon: Gaurav Cruz MD;  Location: [facility]    Cystoscopy,insert ureteral stent  2013    Procedure: CYSTOSCOPY/URETEROSCOPY/STONE EXTRACTION;  Surgeon: Eduin Luna MD;  Location: [facility]    Cystouretero w/lithotripsy  2013    Procedure: LITHOTRIPSY HOLMIUM LASER WITH CYSTOSCOPY;  Surgeon: Eduin Luna MD;  Location: [facility]    Cystouretro w/stone remove  2013    Procedure: CYSTO, WITH INSERTION OF STENT;  Surgeon: Eduin Luna MD;  Location: [facility]    Ercp,diagnostic  10/2024    Other surgical history      KIDNEY STONE REMOVED     Other surgical history      excision of left renal cyst     Other surgical history  11/15/2012    Flow US, RBUS    Other surgical history  2013    flow us- DR. Wilson    Other surgical history  2016    Cystoscopy    Skin surgery  09/15/2020    [facility]-Left ear-Dr. RACHEL Hull     X-ray retrograde pyelogram  2013    Procedure: CYSTOSCOPY/URETEROSCOPY/STONE EXTRACTION;  Surgeon: Eduin Luna MD;  Location: [facility]     Social History     Socioeconomic History    Marital status:    Occupational History    Occupation: corporate insurance    Tobacco Use    Smoking status: Former     Current packs/day: 0.00     Average packs/day: 0.2 packs/day for 8.0 years (1.6 ttl pk-yrs)     Types: Cigarettes     Start date: 1973     Quit date: 1981     Years since quittin.1    Smokeless tobacco: Never   Vaping Use    Vaping status: Never Used   Substance and Sexual Activity    Alcohol use: Yes     Alcohol/week: 0.8 standard drinks of alcohol     Types: 1 Standard drinks or  equivalent per week     Comment: rare    Drug use: Not Currently     Types: Cannabis    Sexual activity: Yes     Partners: Female     Comment:  1980   Other Topics Concern     Service No    Blood Transfusions No    Caffeine Concern Yes    Hobby Hazards No    Stress Concern No    Special Diet Yes    Exercise Yes    Seat Belt Yes    Self-Exams Yes     History   Drug Use Unknown     Available pre-op labs reviewed.               Airway      Mallampati: I  Mouth opening: >3 FB  TM distance: 4 - 6 cm  Neck ROM: full Cardiovascular      Rhythm: regular  Rate: normal     Dental    Dentition appears grossly intact         Pulmonary      Breath sounds clear to auscultation bilaterally.               Other findings              ASA: 3   Plan: MAC  NPO status verified and patient meets guidelines.    Post-procedure pain management plan discussed with surgeon and patient.      Plan/risks discussed with: patient and spouse                Present on Admission:  **None**             [1]   Medications Prior to Admission   Medication Sig Dispense Refill Last Dose/Taking    cefadroxil 500 MG Oral Cap Take by mouth daily.   2/24/2025    omeprazole 20 MG Oral Capsule Delayed Release Take 1 capsule (20 mg total) by mouth every morning before breakfast.   2/23/2025    rivaroxaban (XARELTO) 20 MG Oral Tab Take 1 tablet (20 mg total) by mouth daily with food.   2/21/2025    tamsulosin (FLOMAX) cap Take 1 capsule (0.4 mg total) by mouth nightly. 90 capsule 3 2/23/2025    simvastatin 20 MG Oral Tab Take 1 tablet (20 mg total) by mouth nightly. At Bedtime 90 tablet 3 2/23/2025    Benazepril HCl 20 MG Oral Tab Take 1 tablet (20 mg total) by mouth daily. 90 tablet 3 2/24/2025    MULTI VITAMIN MENS OR TABS 1 TABLET DAILY   Past Week

## 2025-02-24 NOTE — H&P
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    Body mass index is 28.19 kg/m².    Past Medical History:    Calculus of kidney    Cancer (HCC)    bile duct cancer    Chemotherapy-induced nausea    Cholangiocarcinoma (HCC)    Deep vein thrombosis (HCC)    port cath had clot    Esophageal reflux    High blood pressure    High cholesterol    History of recent hospitalization    10 days per pt at [facility]- had high fever, found sepsis - treated w antibiotics/ no fever now    Hypercholesterolemia    HYPERTENSION    Hypertension    Kidney disease    LFT elevation    Metastasis to peritoneal cavity (HCC)    Moderate episode of recurrent major depressive disorder (HCC)    KAREN (obstructive sleep apnea)    mild, AHI 10, O2 jas 84%, AutoPAP 8-20    Other and unspecified hyperlipidemia    OTHER DISEASES    pneumonia after flu    Personal history of antineoplastic chemotherapy    last infusion sep 9, 2024    Pneumonia    Reflux    Sleep apnea    cpap    Unspecified essential hypertension    Visual impairment       Procedure: ERCP    Physician Pre-Sedation Assessment    Pre-Sedation Assessment:    Sedation History: Airway Assessed    ASA Classification: 2. Patient with mild systemic disease    Cardiac:    Respiratory:    Abdomen:      Plan: MAC        Current Facility-Administered Medications   Medication Dose Route Frequency    lactated ringers infusion   Intravenous Continuous    lactated ringers IV bolus 1,000 mL  1,000 mL Intravenous Once    Followed by    lactated ringers infusion  100 mL/hr Intravenous Continuous    indomethacin (Indocin) 100 MG rectal suppository 100 mg  100 mg Rectal Once    ampicillin-sulbactam  (Unasyn) 3 g in sodium chloride 0.9% 100mL IVPB-ADD  3 g Intravenous Once    indomethacin (Indocin) 100 MG rectal suppository           Allergies: Allergies[1]    Past Surgical History:   Procedure Laterality Date    Cholecystectomy      Closed rx nose fx w stabilizatn  03/21/2013    Procedure: CLOSED REDUCTION NASAL FRACTURE;  Surgeon: Lianna Luna MD;  Location: [facility]    Colonoscopy N/A 08/24/2021    Procedure: COLONOSCOPY, with polypectomy;  Surgeon: Gaurav Cruz MD;  Location: [facility]    Colonoscopy & polypectomy  10/07/2015    a small polyp was removed; [facility]    Colonoscopy,remv lesn,snare N/A 10/07/2015    Procedure: COLONOSCOPY, POSSIBLE BIOPSY, POSSIBLE POLYPECTOMY 83176;  Surgeon: Gaurav Cruz MD;  Location: [facility]    Cystoscopy,insert ureteral stent  04/08/2013    Procedure: CYSTOSCOPY/URETEROSCOPY/STONE EXTRACTION;  Surgeon: Eduin Luna MD;  Location: [facility]    Cystouretero w/lithotripsy  04/08/2013    Procedure: LITHOTRIPSY HOLMIUM LASER WITH CYSTOSCOPY;  Surgeon: Eduin Luna MD;  Location: [facility]    Cystouretro w/stone remove  04/08/2013    Procedure: CYSTO, WITH INSERTION OF STENT;  Surgeon: Eduin Luna MD;  Location: [facility]    Ercp,diagnostic  10/2024    Other surgical history      KIDNEY STONE REMOVED     Other surgical history      excision of left renal cyst     Other surgical history  11/15/2012    Flow US, RBUS    Other surgical history  12/05/2013    flow us- DR. Wilson    Other surgical history  2016    Cystoscopy    Skin surgery  09/15/2020    [facility]-Left ear-Dr. RACHEL Hull     X-ray retrograde pyelogram  04/08/2013    Procedure: CYSTOSCOPY/URETEROSCOPY/STONE EXTRACTION;  Surgeon: Eduin Luna MD;  Location: [facility]     Family History   Problem Relation Age of Onset    Other (Other) Father         sepsis    Cancer Mother         lymphoma    Cancer Sister          thyroid CA     Social History     Tobacco Use    Smoking status: Former     Current packs/day: 0.00     Average packs/day: 0.2 packs/day for 8.0 years (1.6 ttl pk-yrs)     Types: Cigarettes     Start date: 1973     Quit date: 1981     Years since quittin.1    Smokeless tobacco: Never   Substance Use Topics    Alcohol use: Yes     Alcohol/week: 0.8 standard drinks of alcohol     Types: 1 Standard drinks or equivalent per week     Comment: rare       SYSTEM Check if Review is Normal Check if Physical Exam is Normal If not normal, please explain:   HEENT [x ] [x ]    NECK & BACK [x ] [x ]    HEART [x ] [x ]    LUNGS [x ] [x ]    ABDOMEN [x ] [x ]    UROGENITAL [ ] [ ]    EXTREMITIES [ ] [ ]    OTHER        [ x ] I have discussed the risks and benefits and alternatives with the patient/family.  They understand and agree to proceed with plan of care.  [ x ] I have reviewed the History and Physical done within the last 30 days.  Any changes noted above.    Robby Betts MD  2025  11:05 AM             [1]   Allergies  Allergen Reactions    Chlorhexidine RASH     Clarissa body wipes

## 2025-02-24 NOTE — ANESTHESIA POSTPROCEDURE EVALUATION
[facility]    Bruce R Salus Patient Status:  Hospital Outpatient Surgery   Age/Gender 74 year old male MRN [id number]   Location [facility] ENDOSCOPY PAIN CENTER Attending Robby Betts MD   Hosp Day # 0 PCP Queta Alanis MD       Anesthesia Post-op Note    ENDOSCOPIC RETROGRADE CHOLANGIOPANCREATOGRAPHY (ERCP) WITH PLASTIC BILIARY STENT REMOVAL, BALLOON CHOLANGIOGRAM AND BALLOON SWEEP, SPYSCOPE AND SPYBITE BIOPSIES    Procedure Summary       Date: 02/24/25 Room / Location:  ENDOSCOPY 01 / [facility]    Anesthesia Start: 1126 Anesthesia Stop: 1211    Procedure: ENDOSCOPIC RETROGRADE CHOLANGIOPANCREATOGRAPHY (ERCP) WITH PLASTIC BILIARY STENT REMOVAL, BALLOON CHOLANGIOGRAM AND BALLOON SWEEP, SPYSCOPE AND SPYBITE BIOPSIES Diagnosis: (CHOLEDOCOLITHIASIS, BILIARY STRICTURE)    Surgeons: Robby Betts MD Anesthesiologist: Remy Lazo MD    Anesthesia Type: MAC ASA Status: 3            Anesthesia Type: MAC    Vitals Value Taken Time   /56 02/24/25 1212   Temp  02/24/25 1212   Pulse 48 02/24/25 1211   Resp 15 02/24/25 1211   SpO2 96 % 02/24/25 1211           Patient Location: Endoscopy    Anesthesia Type: MAC    Airway Patency: patent    Postop Pain Control: adequate    Mental Status: mildly sedated but able to meaningfully participate in the post-anesthesia evaluation    Nausea/Vomiting: none    Cardiopulmonary/Hydration status: stable euvolemic    Complications: no apparent anesthesia related complications    Postop vital signs: stable    Dental Exam: Unchanged from Preop    Patient to be discharged from PACU when criteria met.

## 2025-03-11 RX ORDER — VENLAFAXINE 37.5 MG/1
37.5 TABLET ORAL NIGHTLY
COMMUNITY

## 2025-03-23 ENCOUNTER — ANESTHESIA EVENT (OUTPATIENT)
Dept: ENDOSCOPY | Facility: HOSPITAL | Age: 75
End: 2025-03-23
Payer: MEDICARE

## 2025-03-24 ENCOUNTER — HOSPITAL ENCOUNTER (OUTPATIENT)
Facility: HOSPITAL | Age: 75
Setting detail: HOSPITAL OUTPATIENT SURGERY
Discharge: HOME | End: 2025-03-24
Attending: INTERNAL MEDICINE | Admitting: INTERNAL MEDICINE
Payer: MEDICARE

## 2025-03-24 ENCOUNTER — ANESTHESIA (OUTPATIENT)
Dept: ENDOSCOPY | Facility: HOSPITAL | Age: 75
End: 2025-03-24
Payer: MEDICARE

## 2025-03-24 VITALS
SYSTOLIC BLOOD PRESSURE: 167 MMHG | RESPIRATION RATE: 16 BRPM | OXYGEN SATURATION: 93 % | HEART RATE: 45 BPM | HEIGHT: 71.5 IN | DIASTOLIC BLOOD PRESSURE: 68 MMHG | TEMPERATURE: 97 F | BODY MASS INDEX: 27.66 KG/M2 | WEIGHT: 202 LBS

## 2025-03-24 PROCEDURE — 0F798DZ DILATION OF COMMON BILE DUCT WITH INTRALUMINAL DEVICE, VIA NATURAL OR ARTIFICIAL OPENING ENDOSCOPIC: ICD-10-PCS | Performed by: INTERNAL MEDICINE

## 2025-03-24 DEVICE — COTTON-LEUNG BILIARY STENT
Type: IMPLANTABLE DEVICE | Site: BILIARY | Status: FUNCTIONAL
Brand: COTTON-LEUNG

## 2025-03-24 RX ORDER — NICOTINE POLACRILEX 4 MG
30 LOZENGE BUCCAL
Status: DISCONTINUED | OUTPATIENT
Start: 2025-03-24 | End: 2025-03-24

## 2025-03-24 RX ORDER — AMPICILLIN SODIUM AND SULBACTAM SODIUM 2; 1 G/1; G/1
INJECTION, POWDER, FOR SOLUTION INTRAVENOUS
Status: DISCONTINUED
Start: 2025-03-24 | End: 2025-03-24

## 2025-03-24 RX ORDER — SODIUM CHLORIDE, SODIUM LACTATE, POTASSIUM CHLORIDE, CALCIUM CHLORIDE 600; 310; 30; 20 MG/100ML; MG/100ML; MG/100ML; MG/100ML
INJECTION, SOLUTION INTRAVENOUS CONTINUOUS
Status: DISCONTINUED | OUTPATIENT
Start: 2025-03-24 | End: 2025-03-24

## 2025-03-24 RX ORDER — SODIUM CHLORIDE, SODIUM LACTATE, POTASSIUM CHLORIDE, CALCIUM CHLORIDE 600; 310; 30; 20 MG/100ML; MG/100ML; MG/100ML; MG/100ML
100 INJECTION, SOLUTION INTRAVENOUS CONTINUOUS
Status: DISCONTINUED | OUTPATIENT
Start: 2025-03-24 | End: 2025-03-24

## 2025-03-24 RX ORDER — HYDROMORPHONE HYDROCHLORIDE 1 MG/ML
0.4 INJECTION, SOLUTION INTRAMUSCULAR; INTRAVENOUS; SUBCUTANEOUS EVERY 5 MIN PRN
Status: DISCONTINUED | OUTPATIENT
Start: 2025-03-24 | End: 2025-03-24

## 2025-03-24 RX ORDER — NALOXONE HYDROCHLORIDE 0.4 MG/ML
0.08 INJECTION, SOLUTION INTRAMUSCULAR; INTRAVENOUS; SUBCUTANEOUS AS NEEDED
Status: DISCONTINUED | OUTPATIENT
Start: 2025-03-24 | End: 2025-03-24

## 2025-03-24 RX ORDER — HYDROMORPHONE HYDROCHLORIDE 1 MG/ML
0.2 INJECTION, SOLUTION INTRAMUSCULAR; INTRAVENOUS; SUBCUTANEOUS EVERY 5 MIN PRN
Status: DISCONTINUED | OUTPATIENT
Start: 2025-03-24 | End: 2025-03-24

## 2025-03-24 RX ORDER — DEXTROSE MONOHYDRATE 25 G/50ML
50 INJECTION, SOLUTION INTRAVENOUS
Status: DISCONTINUED | OUTPATIENT
Start: 2025-03-24 | End: 2025-03-24

## 2025-03-24 RX ORDER — HYDROMORPHONE HYDROCHLORIDE 1 MG/ML
0.6 INJECTION, SOLUTION INTRAMUSCULAR; INTRAVENOUS; SUBCUTANEOUS EVERY 5 MIN PRN
Status: DISCONTINUED | OUTPATIENT
Start: 2025-03-24 | End: 2025-03-24

## 2025-03-24 RX ORDER — NICOTINE POLACRILEX 4 MG
15 LOZENGE BUCCAL
Status: DISCONTINUED | OUTPATIENT
Start: 2025-03-24 | End: 2025-03-24

## 2025-03-24 RX ORDER — METOCLOPRAMIDE HYDROCHLORIDE 5 MG/ML
10 INJECTION INTRAMUSCULAR; INTRAVENOUS EVERY 8 HOURS PRN
Status: DISCONTINUED | OUTPATIENT
Start: 2025-03-24 | End: 2025-03-24

## 2025-03-24 RX ORDER — INDOMETHACIN 100 MG
100 SUPPOSITORY, RECTAL RECTAL ONCE
Status: DISCONTINUED | OUTPATIENT
Start: 2025-03-24 | End: 2025-03-24

## 2025-03-24 RX ORDER — ONDANSETRON 2 MG/ML
4 INJECTION INTRAMUSCULAR; INTRAVENOUS EVERY 6 HOURS PRN
Status: DISCONTINUED | OUTPATIENT
Start: 2025-03-24 | End: 2025-03-24

## 2025-03-24 RX ORDER — LIDOCAINE HYDROCHLORIDE 10 MG/ML
INJECTION, SOLUTION EPIDURAL; INFILTRATION; INTRACAUDAL; PERINEURAL AS NEEDED
Status: DISCONTINUED | OUTPATIENT
Start: 2025-03-24 | End: 2025-03-24 | Stop reason: SURG

## 2025-03-24 RX ORDER — 0.9 % SODIUM CHLORIDE 0.9 %
INTRAVENOUS SOLUTION INTRAVENOUS
Status: DISCONTINUED
Start: 2025-03-24 | End: 2025-03-24

## 2025-03-24 RX ADMIN — LIDOCAINE HYDROCHLORIDE 50 MG: 10 INJECTION, SOLUTION EPIDURAL; INFILTRATION; INTRACAUDAL; PERINEURAL at 10:02:00

## 2025-03-24 RX ADMIN — SODIUM CHLORIDE, SODIUM LACTATE, POTASSIUM CHLORIDE, CALCIUM CHLORIDE: 600; 310; 30; 20 INJECTION, SOLUTION INTRAVENOUS at 10:26:00

## 2025-03-24 RX ADMIN — SODIUM CHLORIDE, SODIUM LACTATE, POTASSIUM CHLORIDE, CALCIUM CHLORIDE: 600; 310; 30; 20 INJECTION, SOLUTION INTRAVENOUS at 10:00:00

## 2025-03-24 NOTE — OPERATIVE REPORT
[facility] OPERATIVE REPORT   PATIENT NAME: Bruce R Salus  MRN: [id number]  DATE OF OPERATION: 3/24/2025  PREOPERATIVE DIAGNOSIS: recurrent cholangitis due to stricture in common hepatic duct (CHD) extending into R main hepatic duct (RMD) - last ERCP 4 weeks ago with remove of plastic stent within the metal stent- had recurrent high fevers to 102 about 7 days after ERCP- treated with 10d of cipro; here for plastic stent placement  POSTOPERATIVE DIAGNOSIS:    1.  Small amount of sludge material removed from occluded metal stent   2.  Normal CBD with 1.5cm in-stent stricture in CHD extending into RMD with focal upstream ductal dilation   3.  Successful placement of 10 fr x 12 cm plastic biliary stent with excellent biliary drainage   PROCEDURE PERFORMED: Endoscopic Retrograde Cholangiopancreatoscopy   SEDATION MEDICATIONS: MAC  PREOPERATIVE MEDS: Unasyn 3gm IVPB;  500cc lactated Ringer's solution IV  INTRAPROCEDURE MEDS:   PREPROCEDURE ASSESSMENT: The indication for this procedure is to assess for biliary stent placement. The patient was identified by myself and nursing staff in the exam room. Informed consent was obtained. The patient was seen in clinic and a full H&P was obtained. On brief physical examination, airway is patent. Chest is clear. Heart has regular rate and rhythm. Abdomen is soft, nontender with good bowel sounds. A medication list was taken by nursing today and reviewed by myself. The patient is an ASA grade 2.   PROCEDURE NOTE: The procedure was completed without difficulty. The patient tolerated the procedure well.   The side-viewing duodenoscope was introduced into the esophagus and advanced to the 2nd portion. Previous metal stent appeared occluded with sludge. Biliary cannulation was attempted to be achieved with 9-12mm extraction balloon with preloaded with 035\" guidewire.  Wire was placed into the R intrahepatic duct. Balloon sweep of the CBD was performed wit removal of moderate sludge  material. Cholangiogram showed normal CBD with CHD. Some ductal irregularity and 1.5cm stricture was noted within the proximal part of the metal stent. There was focal ductal dilation proximal to the stricture. A 10fr x 12cm plastic biliary stent was successfully deployed so that the proximal end was within the dilated R duct.  Contrast drainage improved significantly after biliary stent placement. Scope was withdrawn from the patient and patient tolerated the procedure well.  FINDINGS   RMD biliary stricture with upstream ductal dilation- this was previously biopsies to be non-malignant.  Occluded metal stnet was also noted.  Both were treated with placement of plastic biliary stent  RECOMMENDATIONS: repeat ERCP in 4 months or if patient develops cholangitis symptoms.   DISCHARGE:  On discharge, the patient was given an after-visit summary detailing the procedure, findings, followup plans, and an updated medication list.     Thank you very much for the consultation.  I really appreciate it.    Robby Betts MD

## 2025-03-24 NOTE — DISCHARGE INSTRUCTIONS
Home Care Instructions for ERCP With Sedation    Diet:  - Resume your regular diet as tolerated unless otherwise instructed.  - start with light meals to minimize bloating.  - Do not drink alcohol today.    Medication:  - If you have questions about resuming your normal medications, please contact your Primary Care Physician.    Activities:  - Take it easy today. Do not return to work today.  - Do not drive today.  - Do not operate any machinery today (including kitchen equipment).    ERCP:  - You may have a sore throat for 2-3 days following the exam. This is normal. Gargling with warm salt water (1/2 tsp salt to 1 glass warm water) or using throat lozenges will help.  - If you experience any sharp pain in your neck, abdomen or chest, vomiting of blood, oral temperature over 100 degrees Farenheit, light-headedness or dizziness, or any other problems, contact your doctor.    **If unable to reach your doctor, please go to the [facility] Emergency Room**    - Your referring physician will receive a full report of your examination.  - If you do not hear from your doctor's office within two weeks of your biopsy, please call them for your results.    Additional Comments/Instructions (if applicable):     RESUME XARELTO TOMORROW 3/25/25

## 2025-03-24 NOTE — ANESTHESIA POSTPROCEDURE EVALUATION
[facility]    Bruce R Salus Patient Status:  Hospital Outpatient Surgery   Age/Gender 74 year old male MRN [id number]   Location [facility] ENDOSCOPY PAIN CENTER Attending Robby Betts MD   Hosp Day # 0 PCP Queta Alanis MD       Anesthesia Post-op Note    ENDOSCOPIC RETROGRADE CHOLANGIOPANCREATOGRAPHY (ERCP) WITH BALLOON SWEEP, BILIARY STENT PLACEMENT FLUOROSCOPY    Procedure Summary       Date: 03/24/25 Room / Location:  ENDOSCOPY 01 / [facility]    Anesthesia Start: 1001 Anesthesia Stop: 1026    Procedure: ENDOSCOPIC RETROGRADE CHOLANGIOPANCREATOGRAPHY (ERCP) WITH BALLOON SWEEP, BILIARY STENT PLACEMENT FLUOROSCOPY Diagnosis:       (BLLARY OBSTRUCTION BILE DUCT STRICTURE)      (CHOLANGIOCARCINOMA)    Surgeons: Robby Betts MD Anesthesiologist: Shaggy Quiñones MD    Anesthesia Type: MAC ASA Status: 2            Anesthesia Type: MAC    Vitals Value Taken Time   /59 03/24/25 1026   Temp 97.3 °F (36.3 °C) 03/24/25 1026   Pulse 50 03/24/25 1026   Resp 16 03/24/25 1026   SpO2 95 % 03/24/25 1026           Patient Location: Endoscopy    Anesthesia Type: MAC    Airway Patency: patent    Postop Pain Control: adequate    Mental Status: mildly sedated but able to meaningfully participate in the post-anesthesia evaluation    Nausea/Vomiting: none    Cardiopulmonary/Hydration status: stable euvolemic    Complications: no apparent anesthesia related complications    Postop vital signs: stable    Dental Exam: Unchanged from Preop    Patient to be discharged home when criteria met.

## 2025-03-24 NOTE — ANESTHESIA PREPROCEDURE EVALUATION
PRE-OP EVALUATION    Patient Name: Bruce R Salus    Admit Diagnosis: BLLARY OBSTRUCTION BILE DUCT STRICTURE  CHOLANGIOCARCINOMA    Pre-op Diagnosis: BLLARY OBSTRUCTION BILE DUCT STRICTURE  CHOLANGIOCARCINOMA    ENDOSCOPIC RETROGRADE CHOLANGIOPANCREATOGRAPHY (ERCP) WITH FLUOROSCOPY    Anesthesia Procedure: ENDOSCOPIC RETROGRADE CHOLANGIOPANCREATOGRAPHY (ERCP) WITH FLUOROSCOPY    Surgeons and Role:     * Robby Betts MD - Primary    Pre-op vitals reviewed.        Body mass index is 27.78 kg/m².    Current medications reviewed.  Hospital Medications:  No current facility-administered medications on file as of .       Outpatient Medications:   Prescriptions Prior to Admission[1]    Allergies: Chlorhexidine      Anesthesia Evaluation    Patient summary reviewed.    Anesthetic Complications           GI/Hepatic/Renal      (+) GERD                           Cardiovascular  Comment: Hx of DVT                (+) hypertension   (+) hyperlipidemia                                  Endo/Other                                  Pulmonary                    (+) sleep apnea       Neuro/Psych      (+) depression  (+) anxiety                              Past Surgical History:   Procedure Laterality Date    Cholecystectomy      Closed rx nose fx w stabilizatn  03/21/2013    Procedure: CLOSED REDUCTION NASAL FRACTURE;  Surgeon: Lianna Luna MD;  Location: [facility]    Colonoscopy N/A 08/24/2021    Procedure: COLONOSCOPY, with polypectomy;  Surgeon: Gaurav Cruz MD;  Location: [facility]    Colonoscopy & polypectomy  10/07/2015    a small polyp was removed; [facility]    Colonoscopy,remv lesn,snare N/A 10/07/2015    Procedure: COLONOSCOPY, POSSIBLE BIOPSY, POSSIBLE POLYPECTOMY 34544;  Surgeon: Gaurav Cruz MD;  Location: [facility]    Cystoscopy,insert ureteral stent  04/08/2013    Procedure: CYSTOSCOPY/URETEROSCOPY/STONE EXTRACTION;  Surgeon: Eduin Luna MD;  Location: [facility]    Cystouretero w/lithotripsy  2013    Procedure: LITHOTRIPSY HOLMIUM LASER WITH CYSTOSCOPY;  Surgeon: Eduin Luna MD;  Location: [facility]    Cystouretro w/stone remove  2013    Procedure: CYSTO, WITH INSERTION OF STENT;  Surgeon: Eduin Luna MD;  Location: [facility]    Ercp,diagnostic  10/2024    Other surgical history      KIDNEY STONE REMOVED     Other surgical history      excision of left renal cyst     Other surgical history  11/15/2012    Flow US, RBUS    Other surgical history  2013    flow - DR. Wilson    Other surgical history  2016    Cystoscopy    Skin surgery  09/15/2020    [facility]-Left ear-Dr. RACHEL Hull     X-ray retrograde pyelogram  2013    Procedure: CYSTOSCOPY/URETEROSCOPY/STONE EXTRACTION;  Surgeon: Eduin Luna MD;  Location: [facility]     Social History     Socioeconomic History    Marital status:    Occupational History    Occupation: corporate insurance    Tobacco Use    Smoking status: Former     Current packs/day: 0.00     Average packs/day: 0.2 packs/day for 8.0 years (1.6 ttl pk-yrs)     Types: Cigarettes     Start date: 1973     Quit date: 1981     Years since quittin.2    Smokeless tobacco: Never   Vaping Use    Vaping status: Never Used   Substance and Sexual Activity    Alcohol use: Not Currently     Alcohol/week: 0.8 standard drinks of alcohol     Types: 1 Standard drinks or equivalent per week     Comment: rare    Drug use: Not Currently     Types: Cannabis    Sexual activity: Yes     Partners: Female     Comment:     Other Topics Concern     Service No    Blood Transfusions No    Caffeine Concern Yes    Hobby Hazards No    Stress Concern No    Special Diet Yes    Exercise Yes    Seat Belt Yes    Self-Exams Yes     History   Drug Use Unknown     Available pre-op labs reviewed.               Airway      Mallampati: II  Mouth opening: >3 FB  TM distance: > 6  cm  Neck ROM: full Cardiovascular    Cardiovascular exam normal.  Rhythm: regular  Rate: normal     Dental             Pulmonary    Pulmonary exam normal.                 Other findings              ASA: 2   Plan: MAC  NPO status verified and patient meets guidelines.        Comment: This is a limited chart review prior to patient encounter in anticipation of anesthesia for scheduled procedure.    Plan/risks discussed with: patient and significant other                Present on Admission:  **None**             [1]   No medications prior to admission.

## 2025-03-24 NOTE — H&P
History & Physical Examination    Patient Name: Bruce R Salus  MRN: [id number]  CSN: [id number]  YOB: 1950    Diagnosis: BLLARY OBSTRUCTION BILE DUCT STRICTURE  CHOLANGIOCARCINOMA       Present Illness:  Bruce R Salus is a 74 year old male is here BLLARY OBSTRUCTION BILE DUCT STRICTURE  CHOLANGIOCARCINOMA.    Body mass index is 27.78 kg/m².    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   JANUARY 22TH 2025    Pneumonia    Pneumonia due to organism    12 YEARS AGO    Reflux    Sleep apnea    cpap    Unspecified essential hypertension    Visual impairment    GLASSES       Procedure: ERCP  Physician Pre-Sedation Assessment    Pre-Sedation Assessment:    Sedation History: Airway Assessed    ASA Classification: 2. Patient with mild systemic disease    Cardiac:    Respiratory:    Abdomen:      Plan: MAC        Current Facility-Administered Medications   Medication Dose Route Frequency    lactated ringers infusion   Intravenous Continuous    lactated ringers IV bolus 1,000 mL  1,000 mL Intravenous Once    Followed by    lactated ringers infusion  100 mL/hr Intravenous Continuous     indomethacin (Indocin) 100 MG rectal suppository 100 mg  100 mg Rectal Once       Allergies: Allergies[1]    Past Surgical History:   Procedure Laterality Date    Cholecystectomy      Closed rx nose fx w stabilizatn  03/21/2013    Procedure: CLOSED REDUCTION NASAL FRACTURE;  Surgeon: Lianna Luna MD;  Location: [facility]    Colonoscopy N/A 08/24/2021    Procedure: COLONOSCOPY, with polypectomy;  Surgeon: Gaurav Cruz MD;  Location: [facility]    Colonoscopy & polypectomy  10/07/2015    a small polyp was removed; ASC    Colonoscopy,remv lesn,snare N/A 10/07/2015    Procedure: COLONOSCOPY, POSSIBLE BIOPSY, POSSIBLE POLYPECTOMY 46039;  Surgeon: Gaurav Cruz MD;  Location: [facility]    Cystoscopy,insert ureteral stent  04/08/2013    Procedure: CYSTOSCOPY/URETEROSCOPY/STONE EXTRACTION;  Surgeon: Eduin Luna MD;  Location: [facility]    Cystouretero w/lithotripsy  04/08/2013    Procedure: LITHOTRIPSY HOLMIUM LASER WITH CYSTOSCOPY;  Surgeon: Eduin Luna MD;  Location: [facility]    Cystouretro w/stone remove  04/08/2013    Procedure: CYSTO, WITH INSERTION OF STENT;  Surgeon: Eduin Luna MD;  Location: [facility]    Ercp,diagnostic  10/2024    Other surgical history      KIDNEY STONE REMOVED     Other surgical history      excision of left renal cyst     Other surgical history  11/15/2012    Flow US, RBUS    Other surgical history  12/05/2013    flow us- DR. Wilson    Other surgical history  2016    Cystoscopy    Skin surgery  09/15/2020    [facility]-SCC-Left ear-Dr. RACHEL Hull     X-ray retrograde pyelogram  04/08/2013    Procedure: CYSTOSCOPY/URETEROSCOPY/STONE EXTRACTION;  Surgeon: Eduin Luna MD;  Location: [facility]     Family History   Problem Relation Age of Onset    Other (Other) Father         sepsis    Cancer Mother         lymphoma    Cancer Sister         thyroid CA     Social History     Tobacco Use     Smoking status: Former     Current packs/day: 0.00     Average packs/day: 0.2 packs/day for 8.0 years (1.6 ttl pk-yrs)     Types: Cigarettes     Start date: 1973     Quit date: 1981     Years since quittin.2    Smokeless tobacco: Never   Substance Use Topics    Alcohol use: Not Currently     Alcohol/week: 0.8 standard drinks of alcohol     Types: 1 Standard drinks or equivalent per week     Comment: rare       SYSTEM Check if Review is Normal Check if Physical Exam is Normal If not normal, please explain:   HEENT [x ] [x ]    NECK & BACK [x ] [x ]    HEART [x ] [x ]    LUNGS [x ] [x ]    ABDOMEN [x ] [x ]    UROGENITAL [ ] [ ]    EXTREMITIES [ ] [ ]    OTHER        [ x ] I have discussed the risks and benefits and alternatives with the patient/family.  They understand and agree to proceed with plan of care.  [ x ] I have reviewed the History and Physical done within the last 30 days.  Any changes noted above.    Robby Betts MD  3/24/2025  9:56 AM             [1]   Allergies  Allergen Reactions    Chlorhexidine RASH     Clarissa body wipes

## 2025-04-14 ENCOUNTER — APPOINTMENT (OUTPATIENT)
Dept: ULTRASOUND IMAGING | Facility: HOSPITAL | Age: 75
DRG: 374 | End: 2025-04-14
Attending: INTERNAL MEDICINE
Payer: MEDICARE

## 2025-04-14 ENCOUNTER — HOSPITAL ENCOUNTER (INPATIENT)
Facility: HOSPITAL | Age: 75
LOS: 13 days | Discharge: HOSPICE | DRG: 374 | End: 2025-04-28
Attending: EMERGENCY MEDICINE | Admitting: HOSPITALIST
Payer: MEDICARE

## 2025-04-14 ENCOUNTER — ANESTHESIA EVENT (OUTPATIENT)
Dept: SURGERY | Facility: HOSPITAL | Age: 75
End: 2025-04-14
Payer: MEDICARE

## 2025-04-14 ENCOUNTER — ANESTHESIA (OUTPATIENT)
Dept: SURGERY | Facility: HOSPITAL | Age: 75
End: 2025-04-14
Payer: MEDICARE

## 2025-04-14 DIAGNOSIS — R33.9 URINARY RETENTION: Primary | ICD-10-CM

## 2025-04-14 DIAGNOSIS — K62.5 BRIGHT RED BLOOD PER RECTUM: ICD-10-CM

## 2025-04-14 DIAGNOSIS — C22.1 CHOLANGIOCARCINOMA (HCC): ICD-10-CM

## 2025-04-14 DIAGNOSIS — D64.9 ANEMIA, UNSPECIFIED TYPE: ICD-10-CM

## 2025-04-14 DIAGNOSIS — R33.9 URINE RETENTION: ICD-10-CM

## 2025-04-14 LAB
ALBUMIN SERPL-MCNC: 3.7 G/DL (ref 3.2–4.8)
ALBUMIN/GLOB SERPL: 1.3 {RATIO} (ref 1–2)
ALP LIVER SERPL-CCNC: 107 U/L (ref 45–117)
ALT SERPL-CCNC: 16 U/L (ref 10–49)
ANION GAP SERPL CALC-SCNC: 9 MMOL/L (ref 0–18)
APTT PPP: 47.1 SECONDS (ref 23–36)
AST SERPL-CCNC: 20 U/L (ref ?–34)
BASOPHILS # BLD AUTO: 0.04 X10(3) UL (ref 0–0.2)
BASOPHILS NFR BLD AUTO: 0.5 %
BILIRUB SERPL-MCNC: 0.5 MG/DL (ref 0.2–1.1)
BUN BLD-MCNC: 23 MG/DL (ref 9–23)
CALCIUM BLD-MCNC: 9.9 MG/DL (ref 8.7–10.6)
CHLORIDE SERPL-SCNC: 105 MMOL/L (ref 98–112)
CO2 SERPL-SCNC: 25 MMOL/L (ref 21–32)
CREAT BLD-MCNC: 1.17 MG/DL (ref 0.7–1.3)
EGFRCR SERPLBLD CKD-EPI 2021: 65 ML/MIN/1.73M2 (ref 60–?)
EOSINOPHIL # BLD AUTO: 0.04 X10(3) UL (ref 0–0.7)
EOSINOPHIL NFR BLD AUTO: 0.5 %
ERYTHROCYTE [DISTWIDTH] IN BLOOD BY AUTOMATED COUNT: 15.6 %
GLOBULIN PLAS-MCNC: 2.8 G/DL (ref 2–3.5)
GLUCOSE BLD-MCNC: 110 MG/DL (ref 70–99)
HCT VFR BLD AUTO: 32.7 % (ref 39–53)
HGB BLD-MCNC: 10.7 G/DL (ref 13–17.5)
IMM GRANULOCYTES # BLD AUTO: 0.02 X10(3) UL (ref 0–1)
IMM GRANULOCYTES NFR BLD: 0.3 %
INR BLD: 2.79 (ref 0.8–1.2)
LYMPHOCYTES # BLD AUTO: 0.81 X10(3) UL (ref 1–4)
LYMPHOCYTES NFR BLD AUTO: 11 %
MCH RBC QN AUTO: 28.4 PG (ref 26–34)
MCHC RBC AUTO-ENTMCNC: 32.7 G/DL (ref 31–37)
MCV RBC AUTO: 86.7 FL (ref 80–100)
MONOCYTES # BLD AUTO: 0.75 X10(3) UL (ref 0.1–1)
MONOCYTES NFR BLD AUTO: 10.2 %
NEUTROPHILS # BLD AUTO: 5.72 X10 (3) UL (ref 1.5–7.7)
NEUTROPHILS # BLD AUTO: 5.72 X10(3) UL (ref 1.5–7.7)
NEUTROPHILS NFR BLD AUTO: 77.5 %
OSMOLALITY SERPL CALC.SUM OF ELEC: 292 MOSM/KG (ref 275–295)
PLATELET # BLD AUTO: 277 10(3)UL (ref 150–450)
POTASSIUM SERPL-SCNC: 4.4 MMOL/L (ref 3.5–5.1)
PROT SERPL-MCNC: 6.5 G/DL (ref 5.7–8.2)
PROTHROMBIN TIME: 29.6 SECONDS (ref 11.6–14.8)
RBC # BLD AUTO: 3.77 X10(6)UL (ref 3.8–5.8)
SODIUM SERPL-SCNC: 139 MMOL/L (ref 136–145)
WBC # BLD AUTO: 7.4 X10(3) UL (ref 4–11)

## 2025-04-14 PROCEDURE — 0T9B80Z DRAINAGE OF BLADDER WITH DRAINAGE DEVICE, VIA NATURAL OR ARTIFICIAL OPENING ENDOSCOPIC: ICD-10-PCS | Performed by: UROLOGY

## 2025-04-14 PROCEDURE — 76705 ECHO EXAM OF ABDOMEN: CPT | Performed by: INTERNAL MEDICINE

## 2025-04-14 RX ORDER — ACETAMINOPHEN 500 MG
1000 TABLET ORAL 3 TIMES DAILY PRN
COMMUNITY

## 2025-04-14 RX ORDER — VENLAFAXINE HYDROCHLORIDE 37.5 MG/1
37.5 CAPSULE, EXTENDED RELEASE ORAL NIGHTLY
Status: DISCONTINUED | OUTPATIENT
Start: 2025-04-14 | End: 2025-04-28

## 2025-04-14 RX ORDER — HYDROCORTISONE 25 MG/G
CREAM TOPICAL 2 TIMES DAILY PRN
Status: DISCONTINUED | OUTPATIENT
Start: 2025-04-14 | End: 2025-04-28

## 2025-04-14 RX ORDER — ONDANSETRON 2 MG/ML
INJECTION INTRAMUSCULAR; INTRAVENOUS AS NEEDED
Status: DISCONTINUED | OUTPATIENT
Start: 2025-04-14 | End: 2025-04-14 | Stop reason: SURG

## 2025-04-14 RX ORDER — KETOROLAC TROMETHAMINE 30 MG/ML
INJECTION, SOLUTION INTRAMUSCULAR; INTRAVENOUS AS NEEDED
Status: DISCONTINUED | OUTPATIENT
Start: 2025-04-14 | End: 2025-04-14 | Stop reason: SURG

## 2025-04-14 RX ORDER — VENLAFAXINE HYDROCHLORIDE 37.5 MG/1
37.5 CAPSULE, EXTENDED RELEASE ORAL DAILY
COMMUNITY

## 2025-04-14 RX ORDER — TAMSULOSIN HYDROCHLORIDE 0.4 MG/1
0.4 CAPSULE ORAL 2 TIMES DAILY
Status: DISCONTINUED | OUTPATIENT
Start: 2025-04-14 | End: 2025-04-28

## 2025-04-14 RX ORDER — BENAZEPRIL HYDROCHLORIDE 20 MG/1
20 TABLET ORAL DAILY
COMMUNITY
End: 2025-04-28

## 2025-04-14 RX ORDER — TAMSULOSIN HYDROCHLORIDE 0.4 MG/1
0.4 CAPSULE ORAL 2 TIMES DAILY
COMMUNITY

## 2025-04-14 RX ORDER — SODIUM CHLORIDE, SODIUM LACTATE, POTASSIUM CHLORIDE, CALCIUM CHLORIDE 600; 310; 30; 20 MG/100ML; MG/100ML; MG/100ML; MG/100ML
INJECTION, SOLUTION INTRAVENOUS CONTINUOUS PRN
Status: DISCONTINUED | OUTPATIENT
Start: 2025-04-14 | End: 2025-04-14 | Stop reason: SURG

## 2025-04-14 RX ORDER — EPHEDRINE SULFATE 50 MG/ML
INJECTION INTRAVENOUS AS NEEDED
Status: DISCONTINUED | OUTPATIENT
Start: 2025-04-14 | End: 2025-04-14 | Stop reason: SURG

## 2025-04-14 RX ORDER — ACETAMINOPHEN 500 MG
500 TABLET ORAL EVERY 4 HOURS PRN
Status: DISCONTINUED | OUTPATIENT
Start: 2025-04-14 | End: 2025-04-15

## 2025-04-14 RX ORDER — SODIUM CHLORIDE 9 MG/ML
INJECTION, SOLUTION INTRAVENOUS CONTINUOUS
Status: DISCONTINUED | OUTPATIENT
Start: 2025-04-14 | End: 2025-04-18

## 2025-04-14 RX ORDER — LIDOCAINE HYDROCHLORIDE 10 MG/ML
INJECTION, SOLUTION EPIDURAL; INFILTRATION; INTRACAUDAL; PERINEURAL AS NEEDED
Status: DISCONTINUED | OUTPATIENT
Start: 2025-04-14 | End: 2025-04-14 | Stop reason: SURG

## 2025-04-14 RX ORDER — DEXAMETHASONE SODIUM PHOSPHATE 4 MG/ML
VIAL (ML) INJECTION AS NEEDED
Status: DISCONTINUED | OUTPATIENT
Start: 2025-04-14 | End: 2025-04-14 | Stop reason: SURG

## 2025-04-14 RX ORDER — ATORVASTATIN CALCIUM 10 MG/1
10 TABLET, FILM COATED ORAL NIGHTLY
Status: DISCONTINUED | OUTPATIENT
Start: 2025-04-14 | End: 2025-04-28

## 2025-04-14 RX ORDER — PANTOPRAZOLE SODIUM 20 MG/1
20 TABLET, DELAYED RELEASE ORAL
Status: DISCONTINUED | OUTPATIENT
Start: 2025-04-15 | End: 2025-04-24

## 2025-04-14 RX ADMIN — DEXAMETHASONE SODIUM PHOSPHATE 4 MG: 4 MG/ML VIAL (ML) INJECTION at 12:38:00

## 2025-04-14 RX ADMIN — ONDANSETRON 4 MG: 2 INJECTION INTRAMUSCULAR; INTRAVENOUS at 12:56:00

## 2025-04-14 RX ADMIN — LIDOCAINE HYDROCHLORIDE 25 MG: 10 INJECTION, SOLUTION EPIDURAL; INFILTRATION; INTRACAUDAL; PERINEURAL at 12:30:00

## 2025-04-14 RX ADMIN — KETOROLAC TROMETHAMINE 30 MG: 30 INJECTION, SOLUTION INTRAMUSCULAR; INTRAVENOUS at 12:56:00

## 2025-04-14 RX ADMIN — SODIUM CHLORIDE, SODIUM LACTATE, POTASSIUM CHLORIDE, CALCIUM CHLORIDE: 600; 310; 30; 20 INJECTION, SOLUTION INTRAVENOUS at 13:04:00

## 2025-04-14 RX ADMIN — EPHEDRINE SULFATE 10 MG: 50 INJECTION INTRAVENOUS at 12:37:00

## 2025-04-14 RX ADMIN — SODIUM CHLORIDE, SODIUM LACTATE, POTASSIUM CHLORIDE, CALCIUM CHLORIDE: 600; 310; 30; 20 INJECTION, SOLUTION INTRAVENOUS at 12:22:00

## 2025-04-14 NOTE — H&P
General Medicine H&P     Chief Complaint   Patient presents with    Urinary Retention    Abdomen/Flank Pain        PCP: Arlene Bruce MD    Attempted to see earlier, at procedure    History of Present Illness: Patient is a 74 year old male with PMH including but not limited to HTN, HL, Cholangiocarcinoma, KAREN, GERD who p/t [facility] ED c urinary retention.       Pt has cholangiocarcinoma with peritoneal mets and recent recurrent cholangitis secondary to common hepatic duct stricture extending to right main hepatic duct with placement of 10 fr x 12 cm plastic biliary stent on 2025. Now here c urinary retention and noted to have ascites and brbpr.     Was having pain across lower abd.       Pt reports being on xarelto post clot from port. Sees Dr. Cassidy.     Denies cardiac hx. No tob, previously etoh on weekends at times.     Past Medical History[1]   Past Surgical History[2]     ALL:  Allergies[3]     Scheduled Meds[4]    Social History     Tobacco Use    Smoking status: Former     Current packs/day: 0.00     Average packs/day: 0.2 packs/day for 8.0 years (1.6 ttl pk-yrs)     Types: Cigarettes     Start date: 1973     Quit date: 1981     Years since quittin.3    Smokeless tobacco: Never   Substance Use Topics    Alcohol use: Not Currently     Alcohol/week: 0.8 standard drinks of alcohol     Types: 1 Standard drinks or equivalent per week     Comment: rare        Fam Hx  Family History[5]    Review of Systems  Comprehensive ROS reviewed and negative except for what's stated above. \    OBJECTIVE:  /54 (BP Location: Right arm)   Pulse 78   Temp 97.7 °F (36.5 °C) (Oral)   Resp 18   Ht 5' 11\" (1.803 m)   Wt 200 lb (90.7 kg)   SpO2 96%   BMI 27.89 kg/m²     General:  Alert, no distress, appears stated age.    Head:  Normocephalic, without obvious abnormality, atraumatic.   Eyes:  Sclera anicteric, EOMs intact.    Nose: Nares normal,  Mucosa normal    Throat: Lips normal   Neck:  Supple, symmetrical, trachea midline   Lungs:   Clear to auscultation bilaterally. Normal effort   Chest wall:  No tenderness or deformity   Heart:  Regular rate and rhythm, S1, S2 normal, no murmur, rub or gallop appreciated   Abdomen:   Soft, NT/ND, Bowel sounds normal. No masses,  No organomegaly.    Extremities/MSK: Extremities normal/normal movement, atraumatic, no cyanosis  or edema.   Skin: Skin color, texture, turgor normal. No rashes or lesions.    Neurologic: Moving all extremities spontaneously, no focal deficit appreciated          LABS:   Lab Results   Component Value Date    WBC 7.4 04/14/2025    HGB 10.7 04/14/2025    HCT 32.7 04/14/2025    .0 04/14/2025    CREATSERUM 1.17 04/14/2025    BUN 23 04/14/2025     04/14/2025    K 4.4 04/14/2025     04/14/2025    CO2 25.0 04/14/2025     04/14/2025    CA 9.9 04/14/2025    ALB 3.7 04/14/2025    ALKPHO 107 04/14/2025    BILT 0.5 04/14/2025    TP 6.5 04/14/2025    AST 20 04/14/2025    ALT 16 04/14/2025    PTT 47.1 04/14/2025    INR 2.79 04/14/2025    PTP 29.6 04/14/2025       Radiology: US ABDOMEN LIMITED (CPT=76705)  Result Date: 4/14/2025  PROCEDURE:  US ABDOMEN LIMITED (CPT=76705)  COMPARISON:  None.  INDICATIONS:  Abdominal distension rule out ascites  PATIENT STATED HISTORY: (As transcribed by Technologist)     FINDINGS:  There is ascites noted in all 4 quadrants of the abdomen moderate in degree.             CONCLUSION:  Moderate degree of ascites.   LOCATION:  [facility]   Dictated by (CST): Cally Frye MD on 4/14/2025 at 3:48 PM     Finalized by (CST): Cally Frye MD on 4/14/2025 at 3:49 PM            ASSESSMENT / PLAN:    74 year old male with PMH including but not limited to HTN, HL, Cholangiocarcinoma, KAREN, GERD who p/t [facility] ED c urinary retention.       # urinary retention  - NPO, d/w RAFAELA Carreon and Dr. Luna  -cystoscopy, (complicated) placement of wilkerson catheter (over wire) 4/14  - check Ucx  - follow PVR  - flomax    #  Oliguria  - IVF, follow u/o and cr     Cholangiocarcinoma with peritoneal mets and recent recurrent cholangitis secondary to common hepatic duct stricture extending to right main hepatic duct with placement of 10 fr x 12 cm plastic biliary stent on 3/24/25  Ascites   brbpr.     - GI following, apprec, d/w Duane Steel MD   - ultrasound abd c mod degress ascites  - CMP mostly ok  - plan for para tmrw, hold xarelto; INR elevation likely 2/2 this, lower bleeding risk procedure   - proctozone cream BID PRN for bleeding   - onc c/s   - send blood cxs for completeness      Anemia  - hgb 10.7, likely 2/2 malignancy     # HL  -statin    # GERD  -PPI, wean as o/p if appropriate    # Major Depression/ Generalized anxiety d/o   -reviewed home meds, continue SSRI currently appears stable        # Proph  - scds        Outpatient records or previous hospital records reviewed. [facility] hospitalist to continue to follow patient while in house. A total of 75 minutes taken.  Greater than 50% face to face encounter.  D/w pt/wife/son, RN, Eula Patino DO Charles Yohannan, MD  [facility] Hospitalist  Pager: [phone]  4/14/2025  4:22 PM         [1]   Past Medical History:   Anxiety state    BPH (benign prostatic hyperplasia)    Calculus of kidney    Cancer (HCC)    bile duct cancer    Chemotherapy-induced nausea    Cholangiocarcinoma (HCC)    Deep vein thrombosis (HCC)    port cath had clot    Depression    Esophageal reflux    High blood pressure    High cholesterol    History of recent hospitalization    10 days per pt at [facility]- had high fever, found sepsis - treated w antibiotics/ no fever now    Hypercholesterolemia    HYPERTENSION    Hypertension    Kidney disease    LFT elevation    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   JANUARY 22TH 2025    Pneumonia    Pneumonia due to organism    12 YEARS AGO    Reflux    Sleep apnea    cpap    Unspecified essential hypertension    Visual impairment    GLASSES   [2]   Past Surgical History:  Procedure Laterality Date    Cholecystectomy      Closed rx nose fx w stabilizatn  03/21/2013    Procedure: CLOSED REDUCTION NASAL FRACTURE;  Surgeon: Lianna Luna MD;  Location: [facility]    Colonoscopy N/A 08/24/2021    Procedure: COLONOSCOPY, with polypectomy;  Surgeon: Gaurav Cruz MD;  Location: [facility]    Colonoscopy & polypectomy  10/07/2015    a small polyp was removed; ASC    Colonoscopy,remv lesn,snare N/A 10/07/2015    Procedure: COLONOSCOPY, POSSIBLE BIOPSY, POSSIBLE POLYPECTOMY 11255;  Surgeon: Gaurav Cruz MD;  Location: [facility]    Cystoscopy,insert ureteral stent  04/08/2013    Procedure: CYSTOSCOPY/URETEROSCOPY/STONE EXTRACTION;  Surgeon: Eduin Luna MD;  Location: [facility]    Cystouretero w/lithotripsy  04/08/2013    Procedure: LITHOTRIPSY HOLMIUM LASER WITH CYSTOSCOPY;  Surgeon: Eduin Luna MD;  Location: [facility]    Cystourethroscopy      Cystouretro w/stone remove  04/08/2013    Procedure: CYSTO, WITH INSERTION OF STENT;  Surgeon: Eduin Luna MD;  Location: [facility]    Ercp,diagnostic  10/2024    Other surgical history      KIDNEY STONE REMOVED     Other surgical history      excision of left renal cyst     Other surgical history  11/15/2012    Flow US, RBUS    Other surgical history  12/05/2013    flow us- DR. Wilson    Other surgical history  2016    Cystoscopy    Skin surgery  09/15/2020    [facility]-SCC-Left ear-Dr. RACHEL Hull     X-ray retrograde pyelogram  04/08/2013    Procedure: CYSTOSCOPY/URETEROSCOPY/STONE EXTRACTION;  Surgeon: Eduin Luna MD;  Location: [facility]   [3]    Allergies  Allergen Reactions    Chlorhexidine RASH     Clarissa body wipes   [4] [START ON 4/15/2025] pantoprazole, 20 mg, QAM AC  tamsulosin, 0.4 mg, BID  atorvastatin, 10 mg, Nightly  [5]   Family History  Problem Relation Age of Onset    Other (Other) Father         sepsis    Cancer Mother         lymphoma    Cancer Sister         thyroid CA

## 2025-04-14 NOTE — ED PROVIDER NOTES
Patient Seen in: [facility] Emergency Department    History     Chief Complaint   Patient presents with    Urinary Retention    Abdomen/Flank Pain     Stated Complaint: abd pain, urinary retention/ spasms    Subjective:   HPI  Patient is a 75 yo M with a history of BPH, HTN, HLD, cholangiocarcinoma on chemo (last chemo 2 weeks ago) who presents to ED for evaluation of urinary retention. Patient states that he has had lower abdominal burning and clenching pain  with urination over past month. He has seen urology twice. Pt initially seen in urology office and PVR >630 mL but low residual volume from cath with only 50 mL urinary output. Suspected PVR by bladder scan could be falsely elevated due to ascites and LUTS likely worsened by constipation. Patient has been on flomax BID. He was also started on bowel regiemen with miralax. After taking miralax last week, he had multiple episodes of loose stool but is still having less amount of stool than normal. Plan with urology was for cystoscopy tomorrow but patient states he has only been able urinate small amounts since last night. Patient reports associated abdominal distension over past 2 weeks. Patient denies any fevers, n/v, LBP. Pt also has had some BRBPR. Pt is on xarelto for DVT.       Objective:     Past Medical History:    Anxiety state    BPH (benign prostatic hyperplasia)    Calculus of kidney    Cancer (HCC)    bile duct cancer    Chemotherapy-induced nausea    Cholangiocarcinoma (HCC)    Deep vein thrombosis (HCC)    port cath had clot    Depression    Esophageal reflux    High blood pressure    High cholesterol    History of recent hospitalization    10 days per pt at [facility]- had high fever, found sepsis - treated w antibiotics/ no fever now    Hypercholesterolemia    HYPERTENSION    Hypertension    Kidney disease    LFT elevation    Metastasis to peritoneal cavity (HCC)    Moderate episode of recurrent major depressive disorder (HCC)    KAREN (obstructive  sleep apnea)    mild, AHI 10, O2 jas 84%, AutoPAP 8-20    Other and unspecified hyperlipidemia    OTHER DISEASES    pneumonia after flu    Personal history of antineoplastic chemotherapy    last infusion.   JANUARY 22TH 2025    Pneumonia    Pneumonia due to organism    12 YEARS AGO    Reflux    Sleep apnea    cpap    Unspecified essential hypertension    Visual impairment    GLASSES              Past Surgical History:   Procedure Laterality Date    Cholecystectomy      Closed rx nose fx w stabilizatn  03/21/2013    Procedure: CLOSED REDUCTION NASAL FRACTURE;  Surgeon: Lianna Luna MD;  Location: [facility]    Colonoscopy N/A 08/24/2021    Procedure: COLONOSCOPY, with polypectomy;  Surgeon: Gaurav Cruz MD;  Location: [facility]    Colonoscopy & polypectomy  10/07/2015    a small polyp was removed; ASC    Colonoscopy,remv lesn,snare N/A 10/07/2015    Procedure: COLONOSCOPY, POSSIBLE BIOPSY, POSSIBLE POLYPECTOMY 61986;  Surgeon: Gaurav Cruz MD;  Location: [facility]    Cystoscopy,insert ureteral stent  04/08/2013    Procedure: CYSTOSCOPY/URETEROSCOPY/STONE EXTRACTION;  Surgeon: Eduin Luna MD;  Location: [facility]    Cystouretero w/lithotripsy  04/08/2013    Procedure: LITHOTRIPSY HOLMIUM LASER WITH CYSTOSCOPY;  Surgeon: Eduin Luna MD;  Location: [facility]    Cystourethroscopy      Cystouretro w/stone remove  04/08/2013    Procedure: CYSTO, WITH INSERTION OF STENT;  Surgeon: Eduin Luna MD;  Location: [facility]    Ercp,diagnostic  10/2024    Other surgical history      KIDNEY STONE REMOVED     Other surgical history      excision of left renal cyst     Other surgical history  11/15/2012    Flow US, RBUS    Other surgical history  12/05/2013    flow us- DR. Wilson    Other surgical history  2016    Cystoscopy    Skin surgery  09/15/2020    [facility]-SCC-Left ear-Dr. RACHEL Hull     X-ray retrograde pyelogram  04/08/2013     Procedure: CYSTOSCOPY/URETEROSCOPY/STONE EXTRACTION;  Surgeon: Eduin Luna MD;  [facility]                Social History     Socioeconomic History    Marital status:    Occupational History    Occupation: corporate insurance    Tobacco Use    Smoking status: Former     Current packs/day: 0.00     Average packs/day: 0.2 packs/day for 8.0 years (1.6 ttl pk-yrs)     Types: Cigarettes     Start date: 1973     Quit date: 1981     Years since quittin.3    Smokeless tobacco: Never   Vaping Use    Vaping status: Never Used   Substance and Sexual Activity    Alcohol use: Not Currently     Alcohol/week: 0.8 standard drinks of alcohol     Types: 1 Standard drinks or equivalent per week     Comment: rare    Drug use: Not Currently     Types: Cannabis    Sexual activity: Yes     Partners: Female     Comment:     Other Topics Concern     Service No    Blood Transfusions No    Caffeine Concern Yes    Hobby Hazards No    Stress Concern No    Special Diet Yes    Exercise Yes    Seat Belt Yes    Self-Exams Yes     Social Drivers of Health     Food Insecurity: No Food Insecurity (2025)    NCSS - Food Insecurity     Worried About Running Out of Food in the Last Year: No     Ran Out of Food in the Last Year: No   Transportation Needs: No Transportation Needs (2025)    NCSS - Transportation     Lack of Transportation: No   Housing Stability: Not At Risk (2025)    NCSS - Housing/Utilities     Has Housing: Yes     Worried About Losing Housing: No     Unable to Get Utilities: No                  Physical Exam     ED Triage Vitals   BP 25 0625 129/74   Pulse 25 0625 87   Resp 25 0625 18   Temp 25 0625 98.7 °F (37.1 °C)   Temp src 25 0636 Oral   SpO2 25 0625 100 %   O2 Device 25 0625 None (Room air)       Current Vitals:   Vital Signs  BP: 113/55  Pulse: 74  Resp: 18  Temp: 97.9 °F (36.6 °C)  Temp src: Oral  MAP (mmHg):  69    Oxygen Therapy  SpO2: 98 %  O2 Device: None (Room air)  O2 Flow Rate (L/min): 0 L/min  Pulse Oximetry Type: Continuous  Oximetry Probe Site Changed: No  Pulse Ox Probe Location: Ear lobe        Physical Exam  Vitals and nursing note reviewed.   Constitutional:       General: He is not in acute distress.  HENT:      Head: Normocephalic and atraumatic.      Nose: Nose normal.      Mouth/Throat:      Mouth: Mucous membranes are moist.   Eyes:      Extraocular Movements: Extraocular movements intact.      Pupils: Pupils are equal, round, and reactive to light.   Cardiovascular:      Rate and Rhythm: Normal rate and regular rhythm.      Pulses: Normal pulses.   Pulmonary:      Effort: Pulmonary effort is normal. No respiratory distress.      Breath sounds: No wheezing.   Abdominal:      General: There is distension.      Palpations: Abdomen is soft.      Tenderness: There is no abdominal tenderness.   Genitourinary:     Penis: Normal.       Comments: Hemorrhoid noted.   Musculoskeletal:         General: No swelling. Normal range of motion.      Cervical back: Normal range of motion.   Skin:     General: Skin is warm and dry.      Capillary Refill: Capillary refill takes less than 2 seconds.   Neurological:      General: No focal deficit present.      Mental Status: He is alert.   Psychiatric:         Mood and Affect: Mood normal.             ED Course     Labs Reviewed   CBC WITH DIFFERENTIAL WITH PLATELET - Abnormal; Notable for the following components:       Result Value    RBC 3.77 (*)     HGB 10.7 (*)     HCT 32.7 (*)     Lymphocyte Absolute 0.81 (*)     All other components within normal limits   COMP METABOLIC PANEL (14) - Abnormal; Notable for the following components:    Glucose 110 (*)     All other components within normal limits   PTT, ACTIVATED - Abnormal; Notable for the following components:    PTT 47.1 (*)     All other components within normal limits   PROTHROMBIN TIME (PT) - Abnormal; Notable for  the following components:    PT 29.6 (*)     INR 2.79 (*)     All other components within normal limits   ALBUMIN, FLUID   PROTEIN, BODY FLUID   CELL COUNT PERITONEAL FLUID   CYTOLOGY FLUIDS   URINE CULTURE, ROUTINE   BODY FLUID CULT,AEROBIC AND ANAEROBIC       ED Course as of 04/14/25 1832  ------------------------------------------------------------  Time: 04/14 0725  Comment: Discussed with urology. Patient is too uncomfortable to go to office for cystoscopy under local anesthesia so plan for OR today. Will keep NPO and order basic labs. No imaging at this time per urology but will consider after cystoscopy pending results.     CBC shows no leukocytosis, hgb 10.7. CMP unremarkable.     Pt admitted to [facility]. GI also placed on consult given BRBPR.       MDM      Patient is a 75 yo M with a history of BPH, HTN, HLD, cholangiocarcinoma on chemo (last chemo 2 weeks ago) who presents to ED for evaluation of urinary retention. VSS. On exam, patient's abdomen is distended. No significant TTP. Bladder scan showed 1300 mL of urine. ED RN attempted coude but was unsuccessful. Pt had a lot of pain with attempt in ED. Patient states that at urology office they had difficulty with placing catheter and urologist had to use wire to do so. Differential diagnosis includes but not limited to urinary retention 2/2 BPH, UTI, constipation, mass, ascites. PVR in the 1000s on bladder scan. Will discuss with urology.     ED Course as of 04/14/25 1832  ------------------------------------------------------------  Time: 04/14 0725  Comment: Discussed with urology. Patient is too uncomfortable to go to office for cystoscopy under local anesthesia so plan for OR today. Will keep NPO and order basic labs. No imaging at this time per urology but will consider after cystoscopy pending results.     CBC shows no leukocytosis, hgb 10.7. CMP unremarkable.     Pt admitted to [facility]. GI also placed on consult given BRBPR.                 Medical Decision Making  Amount and/or Complexity of Data Reviewed  External Data Reviewed: notes.  Labs: ordered. Decision-making details documented in ED Course.  Discussion of management or test interpretation with external provider(s): Urology    Risk  Decision regarding hospitalization.        Disposition and Plan     Clinical Impression:  1. Urinary retention    2. Urine retention    3. Anemia, unspecified type    4. Bright red blood per rectum    5. Cholangiocarcinoma (HCC)         Disposition:  There is no disposition on file for this visit.  There is no disposition time on file for this visit.    Follow-up:  [facility]OLOG[facility] NURSE  [address]  [address] [place] [address] [id number] [phone]    Follow up in 1 month(s)  catheter removal or catheter exchange          Medications Prescribed:  Current Discharge Medication List          Supplementary Documentation:

## 2025-04-14 NOTE — ANESTHESIA PROCEDURE NOTES
Airway  Date/Time: 4/14/2025 12:31 PM  Reason: elective      General Information and Staff   Patient location during procedure: OR  Anesthesiologist: Faraz Ayala MD  Performed: anesthesiologist   Performed by: Faraz Ayala MD  Authorized by: Faraz Ayala MD        Indications and Patient Condition  Indications for airway management: anesthesia  Sedation level: deep      Preoxygenated: yesPatient position: sniffing    Mask difficulty assessment: 0 - not attempted    Final Airway Details    Final airway type: supraglottic airway      Successful airway: classic  Size: 3     Number of attempts at approach: 1    Additional Comments  Dentition unchanged after DL and intubation, atraumatic procedure.

## 2025-04-14 NOTE — CONSULTS
[facility] IP Report of Consultation Gastroenterology    4/14/2025    Bruce R Salus  male   Duane Steel MD     [id number]  10/31/1950 Primary Care Physician  Arlene Bruce MD     Reason for Consultation: ascites, brbpr    HPI    75 yo M with cholangiocarcinoma with peritoneal mets and recent recurrent cholangitis secondary to common hepatic duct stricture extending to right main hepatic duct with placement of 10 fr x 12 cm plastic biliary stent on March 24, 2025. Plan to repeat ercp in 4 months or sooner if symptoms of cholangitis.     He is admitted with urinary retention and plan for cystoscopy and wilkerson placement.     Recently given miralax for constipation. Multiple loose stools. Noticed increasing abdominal distention for past few weeks.    We are consulted for brbpr and ascites. He denies bleeding and does note hemorrhoids. On xarelto for DVT. INR 2.79. BUN normal. Hgb 10.7 (11.4 end of March 2025) and normocytic. Colonoscopy 2021 with internal hemorrhoids and one adenoma, one hyperplastic.        PROBLEM LIST:   Problem List[1]    PATIENT HISTORY:     Past Medical History[2]   Past Surgical History[3]   Family History[4]   Short Social Hx on File[5]     Allergies;  Allergies[6]     Medications:  Current Hospital Medications[7]     REVIEW OF SYSTEMS:   10 point ros reviewed. Pertinent positive per HPI.        EXAM:   /66 (BP Location: Left arm)   Pulse 71   Temp 98 °F (36.7 °C) (Oral)   Resp 18   Ht 5' 11\" (1.803 m)   Wt 200 lb (90.7 kg)   SpO2 97%   BMI 27.89 kg/m²  Body mass index is 27.89 kg/m².  GENERAL: Well developed, well nourished, in no obvious distress.   CV: RRR  PULM: CTAB  ABDOMEN: Soft, mild distention, Non-tender.   EXTREMITIES: No peripheral edema appreciated.   NEURO: Alert and Oriented x 3.        LAB/IMAGING RESULTS:     Lab Results   Component Value Date    WBC 7.4 04/14/2025    HGB 10.7 04/14/2025    HCT 32.7 04/14/2025    .0 04/14/2025       Lab Results    Component Value Date    WBC 7.4 04/14/2025    HGB 10.7 04/14/2025    HCT 32.7 04/14/2025    .0 04/14/2025    CREATSERUM 1.17 04/14/2025    BUN 23 04/14/2025     04/14/2025    K 4.4 04/14/2025     04/14/2025    CO2 25.0 04/14/2025     04/14/2025    CA 9.9 04/14/2025    ALB 3.7 04/14/2025    ALKPHO 107 04/14/2025    BILT 0.5 04/14/2025    TP 6.5 04/14/2025    AST 20 04/14/2025    ALT 16 04/14/2025    PTT 47.1 04/14/2025    INR 2.79 04/14/2025    PTP 29.6 04/14/2025         ASSESSMENT AND PLAN:   Probable ascites  BRBPR  Cholangiocarcinoma with recurrent cholangitis s/p stent placement March 2025    Up to date on colonoscopy-internal hemorrhoids noted at that time in 2021. Hgb relatively stable and normocytic. Elevated INR. Platelets normal. On xarelto for DVT. AST, ALT, bili, alk phos normal. BRBPR likely internal hemorrhoids. No sign of recurrent cholangitis. Ascites likely malignant.    - us abdomen evaluate ascites  - hold xarelto for likely paracentesis (will order pending us abd cell count and diff, culture, cytology, albumin, protein) if acceptable with hematology oncology team, check inr/pt tomorrow am ordered (elevated today)  - proctozone cream twice daily as needed for bleeding  - no indication for colonoscopy  - cystoscopy and wilkerson placement today    Duane Steel MD  [facility]         [1]   Patient Active Problem List  Diagnosis    Essential hypertension    KAREN on CPAP    History of kidney stones    Aorto-iliac atherosclerosis    BPH without obstruction/lower urinary tract symptoms    Elevated prostate specific antigen (PSA)    Moderate episode of recurrent major depressive disorder (HCC)    BMI 28.0-28.9,adult    History of nonmelanoma skin cancer    Urinary retention   [2]   Past Medical History:   Anxiety state    BPH (benign prostatic hyperplasia)    Calculus of kidney    Cancer (HCC)    bile duct cancer    Chemotherapy-induced nausea    Cholangiocarcinoma (HCC)    Deep vein  thrombosis (HCC)    port cath had clot    Depression    Esophageal reflux    High blood pressure    High cholesterol    History of recent hospitalization    10 days per pt at [facility]- had high fever, found sepsis - treated w antibiotics/ no fever now    Hypercholesterolemia    HYPERTENSION    Hypertension    Kidney disease    LFT elevation    Metastasis to peritoneal cavity (HCC)    Moderate episode of recurrent major depressive disorder (HCC)    KAREN (obstructive sleep apnea)    mild, AHI 10, O2 jas 84%, AutoPAP 8-20    Other and unspecified hyperlipidemia    OTHER DISEASES    pneumonia after flu    Personal history of antineoplastic chemotherapy    last infusion.   JANUARY 22TH 2025    Pneumonia    Pneumonia due to organism    12 YEARS AGO    Reflux    Sleep apnea    cpap    Unspecified essential hypertension    Visual impairment    GLASSES   [3]   Past Surgical History:  Procedure Laterality Date    Cholecystectomy      Closed rx nose fx w stabilizatn  03/21/2013    Procedure: CLOSED REDUCTION NASAL FRACTURE;  Surgeon: Lianna Luna MD;  Location: [facility]    Colonoscopy N/A 08/24/2021    Procedure: COLONOSCOPY, with polypectomy;  Surgeon: Gaurav Cruz MD;  Location: [facility]    Colonoscopy & polypectomy  10/07/2015    a small polyp was removed; ASC    Colonoscopy,remv lesn,snare N/A 10/07/2015    Procedure: COLONOSCOPY, POSSIBLE BIOPSY, POSSIBLE POLYPECTOMY 25163;  Surgeon: Gaurav Cruz MD;  Location: [facility]    Cystoscopy,insert ureteral stent  04/08/2013    Procedure: CYSTOSCOPY/URETEROSCOPY/STONE EXTRACTION;  Surgeon: Eduin Luna MD;  Location: [facility]    Cystouretero w/lithotripsy  04/08/2013    Procedure: LITHOTRIPSY HOLMIUM LASER WITH CYSTOSCOPY;  Surgeon: Eduin Luna MD;  Location: [facility]    Cystouretro w/stone remove  04/08/2013    Procedure: CYSTO, WITH INSERTION OF STENT;  Surgeon: Eduin Luna MD;  Location: [facility], [facility]    Ercp,diagnostic  10/2024    Other surgical history      KIDNEY STONE REMOVED     Other surgical history      excision of left renal cyst     Other surgical history  11/15/2012    Flow US, RBUS    Other surgical history  2013    flow us- DR. Wilson    Other surgical history  2016    Cystoscopy    Skin surgery  09/15/2020    [facility]CC-Left ear-Dr. RACHEL Hull     X-ray retrograde pyelogram  2013    Procedure: CYSTOSCOPY/URETEROSCOPY/STONE EXTRACTION;  Surgeon: Eduin Luna MD;  Location: [facility], [facility]   [4]   Family History  Problem Relation Age of Onset    Other (Other) Father         sepsis    Cancer Mother         lymphoma    Cancer Sister         thyroid CA   [5]   Social History  Socioeconomic History    Marital status:    Occupational History    Occupation: [employer]    Tobacco Use    Smoking status: Former     Current packs/day: 0.00     Average packs/day: 0.2 packs/day for 8.0 years (1.6 ttl pk-yrs)     Types: Cigarettes     Start date: 1973     Quit date: 1981     Years since quittin.3    Smokeless tobacco: Never   Vaping Use    Vaping status: Never Used   Substance and Sexual Activity    Alcohol use: Not Currently     Alcohol/week: 0.8 standard drinks of alcohol     Types: 1 Standard drinks or equivalent per week     Comment: rare    Drug use: Not Currently     Types: Cannabis    Sexual activity: Yes     Partners: Female     Comment:     Other Topics Concern     Service No    Blood Transfusions No    Caffeine Concern Yes    Hobby Hazards No    Stress Concern No    Special Diet Yes    Exercise Yes    Seat Belt Yes    Self-Exams Yes     Social Drivers of Health     Food Insecurity: No Food Insecurity (2025)    NCSS - Food Insecurity     Worried About Running Out of Food in the Last Year: No     Ran Out of Food in the Last Year: No   Transportation Needs: No Transportation Needs (2025)    NCSS - Transportation      Lack of Transportation: No   Housing Stability: Not At Risk (4/14/2025)    NCSS - Housing/Utilities     Has Housing: Yes     Worried About Losing Housing: No     Unable to Get Utilities: No   [6]   Allergies  Allergen Reactions    Chlorhexidine RASH     Clarissa body wipes   [7]   Current Facility-Administered Medications:     acetaminophen (Tylenol Extra Strength) tab 500 mg, 500 mg, Oral, Q4H PRN    [START ON 4/15/2025] pantoprazole (Protonix) DR tab 20 mg, 20 mg, Oral, QAM AC    tamsulosin (Flomax) cap 0.4 mg, 0.4 mg, Oral, BID    atorvastatin (Lipitor) tab 10 mg, 10 mg, Oral, Nightly    hydrocortisone (Anusol-HC) 2.5 % rectal cream, , Rectal, BID PRN

## 2025-04-14 NOTE — PLAN OF CARE
Patient is alert and oriented. On room air. KAREN. Tele with NSR. Abdomen is firm/ distended. Last BM yesterday. On regular diet. Calvo catheter intact. Patient to be on clear liquid diet at [place] for paracentesis at 1000. Call light within reach.

## 2025-04-14 NOTE — ED QUICK NOTES
Orders for admission, patient is aware of plan and ready to go upstairs. Any questions, please call ED RN alma at extension #[phone].     Patient Covid vaccination status: Fully vaccinated     COVID Test Ordered in ED: None    COVID Suspicion at Admission: N/A    Running Infusions: Medication Infusions[1] None    Mental Status/LOC at time of transport: ao    Other pertinent information:   CIWA score: N/A   NIH score:  N/A             [1]

## 2025-04-14 NOTE — PROGRESS NOTES
NURSING ADMISSION NOTE      Patient admitted via Cart  Oriented to room.  Safety precautions initiated.  Bed in low position.  Call light in reach.    Patient arrived from PACU in stable condition.     2 person skin assessment completed with SHELLY Tomlinson. Bruising noted to the right ankle. Redness noted to the LLQ.

## 2025-04-14 NOTE — ED INITIAL ASSESSMENT (HPI)
Patient reports difficulty with urinary retention since march, states he has been following up with a urologist. Scheduled for a cystoscopy. Reports unable to urinate since 3am, and at that point only able to void a small amount. Patient reports low abd pain.

## 2025-04-14 NOTE — ANESTHESIA PREPROCEDURE EVALUATION
PRE-OP EVALUATION    Patient Name: Bruce R Salus    Admit Diagnosis: Urinary retention [R33.9]    Pre-op Diagnosis: Urine retention [R33.9]    CYSTOSCOPY, POSSIBLE URETHRAL DILATION AND MARAVILLA CATHETER PLACEMENT    Anesthesia Procedure: CYSTOSCOPY, POSSIBLE URETHRAL DILATION AND MARAVILLA CATHETER PLACEMENT    Surgeons and Role:     * Eduin Luna MD - Primary    Pre-op vitals reviewed.  Temp: 98 °F (36.7 °C)  Pulse: 71  Resp: 18  BP: 123/66  SpO2: 97 %  Body mass index is 27.89 kg/m².    Current medications reviewed.  Hospital Medications:  Current Medications[1]    Outpatient Medications:   Prescriptions Prior to Admission[2]    Allergies: Chlorhexidine      Anesthesia Evaluation  Problem List[3]     Past Medical History[4]       Past Surgical History[5]  Social Hx on file[6]  History   Drug Use Unknown     Available pre-op labs reviewed.  Lab Results   Component Value Date    WBC 7.4 04/14/2025    RBC 3.77 (L) 04/14/2025    HGB 10.7 (L) 04/14/2025    HCT 32.7 (L) 04/14/2025    MCV 86.7 04/14/2025    MCH 28.4 04/14/2025    MCHC 32.7 04/14/2025    RDW 15.6 04/14/2025    .0 04/14/2025     Lab Results   Component Value Date     04/14/2025    K 4.4 04/14/2025     04/14/2025    CO2 25.0 04/14/2025    BUN 23 04/14/2025    CREATSERUM 1.17 04/14/2025     (H) 04/14/2025    CA 9.9 04/14/2025     Lab Results   Component Value Date    INR 2.79 (H) 04/14/2025         Airway      Mallampati: I  Mouth opening: >3 FB  TM distance: 4 - 6 cm  Neck ROM: full Cardiovascular      Rhythm: regular  Rate: normal     Dental    Dentition appears grossly intact         Pulmonary    Pulmonary exam normal.  Breath sounds clear to auscultation bilaterally.               Other findings              ASA: 3   Plan: general  NPO status verified and     Post-procedure pain management plan discussed with surgeon and patient.    Comment:  I explained intrinsic risks of general anesthesia, including nausea, dental damage, sore  throat, mouth injury,and hoarseness from airway management.  All questions were answered and understanding was demonstrated of risks.  Informed permission was obtained to proceed as documented in the signed consent form.      Plan/risks discussed with: patient  Use of blood product(s) discussed with: patient    Consented to blood products.          Present on Admission:  **None**             [1]    acetaminophen (Tylenol Extra Strength) tab 500 mg  500 mg Oral Q4H PRN    [START ON 4/15/2025] pantoprazole (Protonix) DR tab 20 mg  20 mg Oral QAM AC    tamsulosin (Flomax) cap 0.4 mg  0.4 mg Oral BID    atorvastatin (Lipitor) tab 10 mg  10 mg Oral Nightly    hydrocortisone (Anusol-HC) 2.5 % rectal cream   Rectal BID PRN   [2]   Medications Prior to Admission   Medication Sig Dispense Refill Last Dose/Taking    Benazepril HCl 20 MG Oral Tab Take 1 tablet (20 mg total) by mouth daily.   4/14/2025 Morning    tamsulosin 0.4 MG Oral Cap Take 1 capsule (0.4 mg total) by mouth 2 (two) times daily.   4/14/2025 Morning    venlafaxine ER 37.5 MG Oral Capsule SR 24 Hr Take 1 capsule (37.5 mg total) by mouth daily.   4/13/2025 Bedtime    acetaminophen 500 MG Oral Tab Take 2 tablets (1,000 mg total) by mouth 3 (three) times daily as needed for Pain.   4/14/2025 at  4:00 AM    cefadroxil 500 MG Oral Cap Take 1 capsule (500 mg total) by mouth in the morning.   4/14/2025 Morning    omeprazole 20 MG Oral Capsule Delayed Release Take 1 capsule (20 mg total) by mouth every morning before breakfast.   4/14/2025 Morning    rivaroxaban (XARELTO) 20 MG Oral Tab Take 1 tablet (20 mg total) by mouth daily with food.   4/14/2025 Morning    simvastatin 20 MG Oral Tab Take 1 tablet (20 mg total) by mouth nightly. At Bedtime 90 tablet 3 4/13/2025 Bedtime    MULTI VITAMIN MENS OR TABS Take 1 tablet by mouth daily.   4/14/2025 Morning   [3]   Patient Active Problem List  Diagnosis    Essential hypertension    KAREN on CPAP    History of kidney stones     Aorto-iliac atherosclerosis    BPH without obstruction/lower urinary tract symptoms    Elevated prostate specific antigen (PSA)    Moderate episode of recurrent major depressive disorder (HCC)    BMI 28.0-28.9,adult    History of nonmelanoma skin cancer    Urinary retention   [4]   Past Medical History:   Anxiety state    BPH (benign prostatic hyperplasia)    Calculus of kidney    Cancer (HCC)    bile duct cancer    Chemotherapy-induced nausea    Cholangiocarcinoma (HCC)    Deep vein thrombosis (HCC)    port cath had clot    Depression    Esophageal reflux    High blood pressure    High cholesterol    History of recent hospitalization    10 days per pt at [facility]- had high fever, found sepsis - treated w antibiotics/ no fever now    Hypercholesterolemia    HYPERTENSION    Hypertension    Kidney disease    LFT elevation    Metastasis to peritoneal cavity (HCC)    Moderate episode of recurrent major depressive disorder (HCC)    KAREN (obstructive sleep apnea)    mild, AHI 10, O2 jas 84%, AutoPAP 8-20    Other and unspecified hyperlipidemia    OTHER DISEASES    pneumonia after flu    Personal history of antineoplastic chemotherapy    last infusion.   JANUARY 22TH 2025    Pneumonia    Pneumonia due to organism    12 YEARS AGO    Reflux    Sleep apnea    cpap    Unspecified essential hypertension    Visual impairment    GLASSES   [5]   Past Surgical History:  Procedure Laterality Date    Cholecystectomy      Closed rx nose fx w stabilizatn  03/21/2013    Procedure: CLOSED REDUCTION NASAL FRACTURE;  Surgeon: Lianna Luna MD;  [facility]    Colonoscopy N/A 08/24/2021    Procedure: COLONOSCOPY, with polypectomy;  Surgeon: Gaurav Cruz MD;  Location: [facility]    Colonoscopy & polypectomy  10/07/2015    a small polyp was removed; ASC    Colonoscopy,remv lesn,snare N/A 10/07/2015    Procedure: COLONOSCOPY, POSSIBLE BIOPSY, POSSIBLE POLYPECTOMY 85485;  Surgeon: Gaurav Cruz MD;   Location: [facility]    Cystoscopy,insert ureteral stent  2013    Procedure: CYSTOSCOPY/URETEROSCOPY/STONE EXTRACTION;  Surgeon: Eduin Luna MD;  Location: [facility]    Cystouretero w/lithotripsy  2013    Procedure: LITHOTRIPSY HOLMIUM LASER WITH CYSTOSCOPY;  Surgeon: Eduin Luna MD;  Location: [facility]    Cystouretro w/stone remove  2013    Procedure: CYSTO, WITH INSERTION OF STENT;  Surgeon: Eduin Luna MD;  Location: [facility]    Ercp,diagnostic  10/2024    Other surgical history      KIDNEY STONE REMOVED     Other surgical history      excision of left renal cyst     Other surgical history  11/15/2012    Flow US, RBUS    Other surgical history  2013    flow - DR. Wilson    Other surgical history  2016    Cystoscopy    Skin surgery  09/15/2020    [facility]-SCC-Left ear-Dr. RACHEL Hull     X-ray retrograde pyelogram  2013    Procedure: CYSTOSCOPY/URETEROSCOPY/STONE EXTRACTION;  Surgeon: Eduin Luna MD;  Location: [facility]   [6]   Social History  Socioeconomic History    Marital status:    Occupational History    Occupation: [employer]    Tobacco Use    Smoking status: Former     Current packs/day: 0.00     Average packs/day: 0.2 packs/day for 8.0 years (1.6 ttl pk-yrs)     Types: Cigarettes     Start date: 1973     Quit date: 1981     Years since quittin.3    Smokeless tobacco: Never   Vaping Use    Vaping status: Never Used   Substance and Sexual Activity    Alcohol use: Not Currently     Alcohol/week: 0.8 standard drinks of alcohol     Types: 1 Standard drinks or equivalent per week     Comment: rare    Drug use: Not Currently     Types: Cannabis    Sexual activity: Yes     Partners: Female     Comment:     Other Topics Concern     Service No    Blood Transfusions No    Caffeine Concern Yes    Hobby Hazards No    Stress Concern No    Special Diet Yes    Exercise  Yes    Seat Belt Yes    Self-Exams Yes

## 2025-04-14 NOTE — CONSULTS
[facility]  Department of Urology   Consultation Note    Bruce R Salus Patient Status:  Observation    10/31/1950 MRN [id number]   Location [facility] 3NW-A Attending Suleman Tejada MD   Hosp Day # 0 PCP Arlene Bruce MD     Reason for Consultation:  Urinary retention  Inability to place wilkerson catheter    History of Present Illness:  Bruce R Salus is a a(n) 74 year old male.     Seen by Dr. Omero Aleman on 25  BPH with urinary obstruction/elevated PSA  -Remote hx of neg TRUS bx  - present Family Hx of PCa in Dad - diagnosed in his 70s  -Hx of TUNA  -10/21 MRI pirads 2 vol 69 mL  -3/25 PSA 4.57 = decreased  - On tamsulosin, tried finasteride in the past  -25 UA micro/UCx neg  -2025  mL, UA neg bld/le -> cath'd in office for only ~50 mL urine output -> suspect PVR by bladder scan falsely elevated due to ascites -> LUTS likely worsened by constipation     Seen by Dr. Trivedi on 25 for issues voiding. PVR ok per note.    Discussed PVP  Scheduled for cystoscopy tomorrow    Presented with abdominal pain, difficulty voiding.    Bladder scan 1300 mL    Tmax 100.5 a few days ago  No nausea, vomiting  No gross hematuria    ER attempted to place wilkerson catheter without success    Urology was consulted.    Cholangiocarcinoma with peritoneal mets and recent recurrent cholangitis secondary to common hepatic duct stricture extending to right main hepatic duct with placement of 10 fr x 12 cm plastic biliary stent on 2025.     Recently given miralax for constipation  Loose stools.  Abdominal distention  BRBPR    GI has seen patient for ascites, BRBPR - plan on US abdomen to evaluate for ascites.  Hold xarelto for likley paracentesis    Family at bedside    History:  Past Medical History[1]  Past Surgical History[2]  Family History[3]   reports that he quit smoking about 44 years ago. His smoking use included cigarettes. He started smoking about 52  years ago. He has a 1.6 pack-year smoking history. He has never used smokeless tobacco. He reports that he does not currently use alcohol after a past usage of about 0.8 standard drinks of alcohol per week. He reports that he does not currently use drugs after having used the following drugs: Cannabis.    Allergies:  Allergies[4]    Medications:  Current Hospital Medications[5]    Review of Systems:  Pertinent items are noted in HPI.  10 point review of systems completed.    Physical Exam:  /66 (BP Location: Left arm)   Pulse 71   Temp 98 °F (36.7 °C) (Oral)   Resp 18   Ht 5' 11\" (1.803 m)   Wt 200 lb (90.7 kg)   SpO2 97%   BMI 27.89 kg/m²   GENERAL: the patient is resting in bed in no acute distress.    HEENT: Unremarkable.  NECK: Supple.   LUNGS: non-labored respirations.  ABDOMEN:  The abdomen is soft, distention. Non-tender.    BACK: Without CVA tenderness.    Laboratory Data:  Lab Results   Component Value Date    WBC 7.4 04/14/2025    HGB 10.7 04/14/2025    HCT 32.7 04/14/2025    .0 04/14/2025    CREATSERUM 1.17 04/14/2025    BUN 23 04/14/2025     04/14/2025    K 4.4 04/14/2025     04/14/2025    CO2 25.0 04/14/2025     04/14/2025    CA 9.9 04/14/2025    ALB 3.7 04/14/2025    ALKPHO 107 04/14/2025    BILT 0.5 04/14/2025    TP 6.5 04/14/2025    AST 20 04/14/2025    ALT 16 04/14/2025    PTT 47.1 04/14/2025    INR 2.79 04/14/2025    PTP 29.6 04/14/2025     Imaging:     Impression:  Problem List[6]    DIFFICULTY VOIDING, INABILITY TO PLACE WILKERSON CATHETER  BPH  Reported temp 100.5 a few days ago  Afebrile since admission, VSS  No leukocytosis  Hgb 10.7  Platelet count 277  Serum creat 1.17   INR 2.79  On xarelto - last dose this AM    Recommendations:  -Discussed possibility of elevated PVR scan falsely elevated due to ascites   -OR today for cystoscopy, possible urethral dilation, wilkerson catheter placement.  Reviewed risks, benefits, alternatives, and complication of the  procedure including but not limited to risk of anesthesia,  injury, bleeding, infection.  Reviewed the wilkerson catheter is not a permament implant and would eventually need to be removed.  Patient/family agree and understands.      NPO  Consent to be signed  Ancef on call to OR    Abdominal US has been ordered by GI    Above discussed with patient, family, nurse, Dr. Luna    Thank you for allowing me to participate in the care of your patient.    Kathia Carreon PA-C  [facility]  Department of Urology  4/14/2025  11:01 AM         [1]   Past Medical History:   Anxiety state    BPH (benign prostatic hyperplasia)    Calculus of kidney    Cancer (HCC)    bile duct cancer    Chemotherapy-induced nausea    Cholangiocarcinoma (HCC)    Deep vein thrombosis (HCC)    port cath had clot    Depression    Esophageal reflux    High blood pressure    High cholesterol    History of recent hospitalization    10 days per pt at [facility]- had high fever, found sepsis - treated w antibiotics/ no fever now    Hypercholesterolemia    HYPERTENSION    Hypertension    Kidney disease    LFT elevation    Metastasis to peritoneal cavity (HCC)    Moderate episode of recurrent major depressive disorder (HCC)    KAREN (obstructive sleep apnea)    mild, AHI 10, O2 jas 84%, AutoPAP 8-20    Other and unspecified hyperlipidemia    OTHER DISEASES    pneumonia after flu    Personal history of antineoplastic chemotherapy    last infusion.   JANUARY 22TH 2025    Pneumonia    Pneumonia due to organism    12 YEARS AGO    Reflux    Sleep apnea    cpap    Unspecified essential hypertension    Visual impairment    GLASSES   [2]   Past Surgical History:  Procedure Laterality Date    Cholecystectomy      Closed rx nose fx w stabilizatn  03/21/2013    Procedure: CLOSED REDUCTION NASAL FRACTURE;  Surgeon: Lianna Luna MD;  Location: [facility]    Colonoscopy N/A 08/24/2021    Procedure: COLONOSCOPY, with polypectomy;  Surgeon: Gaurav Cruz MD;   Location: [facility]    Colonoscopy & polypectomy  10/07/2015    a small polyp was removed; ASC    Colonoscopy,remv lesn,snare N/A 10/07/2015    Procedure: COLONOSCOPY, POSSIBLE BIOPSY, POSSIBLE POLYPECTOMY 14495;  Surgeon: Gaurav Cruz MD;  Location: [facility]    Cystoscopy,insert ureteral stent  04/08/2013    Procedure: CYSTOSCOPY/URETEROSCOPY/STONE EXTRACTION;  Surgeon: Eduin Luna MD;  Location: [facility]    Cystouretero w/lithotripsy  04/08/2013    Procedure: LITHOTRIPSY HOLMIUM LASER WITH CYSTOSCOPY;  Surgeon: Eduin Luna MD;  Location: [facility]    Cystouretro w/stone remove  04/08/2013    Procedure: CYSTO, WITH INSERTION OF STENT;  Surgeon: Eduin Luna MD;  Location: [facility]    Ercp,diagnostic  10/2024    Other surgical history      KIDNEY STONE REMOVED     Other surgical history      excision of left renal cyst     Other surgical history  11/15/2012    Flow US, RBUS    Other surgical history  12/05/2013    flow us- DR. Wilson    Other surgical history  2016    Cystoscopy    Skin surgery  09/15/2020    [facility]MS-SCC-Left ear-Dr. RACHEL Hull     X-ray retrograde pyelogram  04/08/2013    Procedure: CYSTOSCOPY/URETEROSCOPY/STONE EXTRACTION;  Surgeon: Eduin Luna MD;  Location: [facility]   [3]   Family History  Problem Relation Age of Onset    Other (Other) Father         sepsis    Cancer Mother         lymphoma    Cancer Sister         thyroid CA   [4]   Allergies  Allergen Reactions    Chlorhexidine RASH     Clarissa body wipes   [5]   Current Facility-Administered Medications:     acetaminophen (Tylenol Extra Strength) tab 500 mg, 500 mg, Oral, Q4H PRN    [START ON 4/15/2025] pantoprazole (Protonix) DR tab 20 mg, 20 mg, Oral, QAM AC    tamsulosin (Flomax) cap 0.4 mg, 0.4 mg, Oral, BID    atorvastatin (Lipitor) tab 10 mg, 10 mg, Oral, Nightly    hydrocortisone (Anusol-HC) 2.5 % rectal cream, , Rectal, BID PRN  [6]    Patient Active Problem List  Diagnosis    Essential hypertension    KAREN on CPAP    History of kidney stones    Aorto-iliac atherosclerosis    BPH without obstruction/lower urinary tract symptoms    Elevated prostate specific antigen (PSA)    Moderate episode of recurrent major depressive disorder (HCC)    BMI 28.0-28.9,adult    History of nonmelanoma skin cancer    Urinary retention

## 2025-04-14 NOTE — PLAN OF CARE
Pt admitted with urinary retention. Unable to place wilkerson in the urology office or in the ER. Pt abd distended, rounded. Pt rating pain as mild. Currently pt scheduled for OR at 1500 with Dr Luna. Dr Steel messaged via [employer] about new GI consult. Pt instructed to remain NPO.

## 2025-04-14 NOTE — ED QUICK NOTES
Unable to insert wilkerson cath even with Coude catheter. Pt have chronic prostate issues with scheduled cystoscopy tomorrow. Per pt hx of hard wilkerson cath isnertion even @ urologist office. Dr BRISA Patino made aware. Will paged urologist

## 2025-04-14 NOTE — ANESTHESIA POSTPROCEDURE EVALUATION
[facility]    Bruce R Salus Patient Status:  Observation   Age/Gender 74 year old male MRN [id number]   Location [facility] SURGERY Attending Suleman Tejada MD   Hosp Day # 0 PCP Arlene Bruce MD       Anesthesia Post-op Note    CYSTOSCOPY, complicatited  MARAVILLA CATHETER PLACEMENT    Procedure Summary       Date: 04/14/25 Room / Location:  MAIN OR 07 /  MAIN OR    Anesthesia Start: 1222 Anesthesia Stop:     Procedure: CYSTOSCOPY, complicatited  MARAVILLA CATHETER PLACEMENT (Urethra) Diagnosis:       Urine retention      (Urine retention [R33.9])    Surgeons: Eduin Luna MD Anesthesiologist: Faraz Ayala MD    Anesthesia Type: general ASA Status: 3            Anesthesia Type: general    Vitals Value Taken Time   /67 04/14/25 13:04   Temp 98.5 04/14/25 13:04   Pulse 91 04/14/25 13:04   Resp 16 04/14/25 13:04   SpO2 100 04/14/25 13:04           Patient Location: PACU    Anesthesia Type: general    Airway Patency: patent    Postop Pain Control: adequate    Mental Status: preanesthetic baseline    Nausea/Vomiting: none    Cardiopulmonary/Hydration status: stable euvolemic    Complications: no apparent anesthesia related complications    Postop vital signs: stable    Dental Exam: Unchanged from Preop    Patient to be discharged from PACU when criteria met.

## 2025-04-14 NOTE — OPERATIVE REPORT
Operative Note    Patient Name: Bruce R Salus    Preoperative Diagnosis: Urine retention [R33.9]    Postoperative Diagnosis: Urine retention [R33.9]    Surgeons and Role:     * Eduin Luna MD - Primary     Assistant:      Procedures: cystoscopy, (complicated) placement of wilkerson catheter (over wire)    Surgical Findings: no urethral stricture, lateral lobe enlargement. High riding bladder neck.  Scope passes without obstruction, however coude catheter does not pass easily past prostatic fossa/bladder neck to get into the bladder. Passed with wire in place for guidance. Return of urine clear.  Residual urine was only 250ml, not 1300ml, suspect the rest of the fluid detected on PVR machine is his ascites leading to abdominal distension    Anesthesia: General    Complications: none    Implants: none    Specimen: urine from bladder sent for urine culture     Drains: 18fr coude wilkerson catheter, 12ml in balloon    Condition: stable    Estimated Blood Loss: 1ml    Procedure Summary: After informed consent was obtained and in the chart, preoperative antibiotics were given, and the patient was taken to the operating room suite and placed on the operating room table in supine position. After bilateral sequential devices had been applied and satisfactory general anesthesia had been achieved, the patients' legs were raised to a dorsal lithotomy position. The patients' groin was prepped and draped in the usual sterile fashion. A well lubricated 21Fr rigid cystoscope was introduced through through the urethra, and into the bladder. The urethra is without stricture. The veru is normal. There are lateral lobes coapting. There is a high riding bladder neck, but not a lot of median lobe. Upon entering the bladder, there was 1+ trabeculation, no cystitis, no tumor, no diverticuli.  Urine residual was just under 250ml.  100ml was sent for urine culture.  There are some tiny stones in the bladder, free floating, generally only 1mm in  size, flushed out.  The ureteral orifices were identified. Both are clear and normal without obstruction.  With bladder filled with irrigant, I tried to pass a coude catheter by itself and met resistance and catheter curling in the prostatic fossa.  Hole was cut at the end of this catheter and wire was passed into the bladder using scope guidance, and now the wilkerson was able to be placed.  Return is clear.    Disposition: The patient tolerated the procedure well, was extubated in the OR and transferred to the PACU in stable condition with no immediately apparent complications. The patient is to follow up with GI team to see if ascites needs to be addressed    He is due for chemotherapy on Wednesday        Eduin Luna MD

## 2025-04-14 NOTE — ED QUICK NOTES
Took over for samir agustin made call out for urology as rn unable to place wilkerson. Comfort measure offered wife at bs

## 2025-04-14 NOTE — IMAGING NOTE
Spoke with bedside RN Rena. Pt will have PT/INR repeated in AM, keep on clear liquids but can have routine meds. Xarelto does not have to be held based on our protocol. Pt is able to sign consent in department.

## 2025-04-15 ENCOUNTER — APPOINTMENT (OUTPATIENT)
Dept: ULTRASOUND IMAGING | Facility: HOSPITAL | Age: 75
DRG: 374 | End: 2025-04-15
Attending: INTERNAL MEDICINE
Payer: MEDICARE

## 2025-04-15 LAB
ALBUMIN FLD-MCNC: 2 G/DL
ANION GAP SERPL CALC-SCNC: 8 MMOL/L (ref 0–18)
BASOPHILS # BLD AUTO: 0.02 X10(3) UL (ref 0–0.2)
BASOPHILS NFR BLD AUTO: 0.2 %
BASOPHILS NFR PRT: 0 %
BUN BLD-MCNC: 24 MG/DL (ref 9–23)
CALCIUM BLD-MCNC: 9.4 MG/DL (ref 8.7–10.6)
CHLORIDE SERPL-SCNC: 107 MMOL/L (ref 98–112)
CO2 SERPL-SCNC: 24 MMOL/L (ref 21–32)
COLOR FLD: YELLOW
CREAT BLD-MCNC: 1.21 MG/DL (ref 0.7–1.3)
EGFRCR SERPLBLD CKD-EPI 2021: 62 ML/MIN/1.73M2 (ref 60–?)
EOSINOPHIL # BLD AUTO: 0.01 X10(3) UL (ref 0–0.7)
EOSINOPHIL NFR BLD AUTO: 0.1 %
EOSINOPHIL NFR PRT: 1 %
ERYTHROCYTE [DISTWIDTH] IN BLOOD BY AUTOMATED COUNT: 15.8 %
GLUCOSE BLD-MCNC: 149 MG/DL (ref 70–99)
HCT VFR BLD AUTO: 28.9 % (ref 39–53)
HGB BLD-MCNC: 9.5 G/DL (ref 13–17.5)
IMM GRANULOCYTES # BLD AUTO: 0.03 X10(3) UL (ref 0–1)
IMM GRANULOCYTES NFR BLD: 0.3 %
INR BLD: 1.92 (ref 0.8–1.2)
LYMPHOCYTES # BLD AUTO: 0.61 X10(3) UL (ref 1–4)
LYMPHOCYTES NFR BLD AUTO: 6.5 %
LYMPHOCYTES NFR PRT: 21 %
MAGNESIUM SERPL-MCNC: 1.9 MG/DL (ref 1.6–2.6)
MCH RBC QN AUTO: 28.2 PG (ref 26–34)
MCHC RBC AUTO-ENTMCNC: 32.9 G/DL (ref 31–37)
MCV RBC AUTO: 85.8 FL (ref 80–100)
MESOTHL CELL NFR PRT: 4 %
MONOCYTES # BLD AUTO: 0.9 X10(3) UL (ref 0.1–1)
MONOCYTES NFR BLD AUTO: 9.5 %
MONOS+MACROS NFR PRT: 20 %
NEUTROPHILS # BLD AUTO: 7.86 X10 (3) UL (ref 1.5–7.7)
NEUTROPHILS # BLD AUTO: 7.86 X10(3) UL (ref 1.5–7.7)
NEUTROPHILS NFR BLD AUTO: 83.4 %
NEUTROPHILS PERITONEAL FLUID: 54 %
OSMOLALITY SERPL CALC.SUM OF ELEC: 295 MOSM/KG (ref 275–295)
PLATELET # BLD AUTO: 290 10(3)UL (ref 150–450)
POTASSIUM SERPL-SCNC: 4.8 MMOL/L (ref 3.5–5.1)
PROT FLD-MCNC: 3.4 G/DL
PROTHROMBIN TIME: 22.2 SECONDS (ref 11.6–14.8)
RBC # BLD AUTO: 3.37 X10(6)UL (ref 3.8–5.8)
RBC PERITONEAL FLUID: 5000 /MM3
SODIUM SERPL-SCNC: 139 MMOL/L (ref 136–145)
TOTAL CELLS COUNTED FLD: 100
WBC # BLD AUTO: 9.4 X10(3) UL (ref 4–11)
WBC # PRT: 1083 /MM3

## 2025-04-15 PROCEDURE — 49083 ABD PARACENTESIS W/IMAGING: CPT | Performed by: INTERNAL MEDICINE

## 2025-04-15 PROCEDURE — 0W9G3ZZ DRAINAGE OF PERITONEAL CAVITY, PERCUTANEOUS APPROACH: ICD-10-PCS | Performed by: RADIOLOGY

## 2025-04-15 RX ORDER — ACETAMINOPHEN 500 MG
TABLET ORAL EVERY 4 HOURS PRN
Status: DISCONTINUED | OUTPATIENT
Start: 2025-04-15 | End: 2025-04-28

## 2025-04-15 RX ORDER — LISINOPRIL 20 MG/1
20 TABLET ORAL DAILY
Status: DISCONTINUED | OUTPATIENT
Start: 2025-04-16 | End: 2025-04-28

## 2025-04-15 NOTE — PROGRESS NOTES
[facility] Hospitalist Progress Note     PCP: Arlene Bruce MD    Chief Complaint: follow-up   Follow up for: The primary encounter diagnosis was Urinary retention. Diagnoses of Urine retention, Anemia, unspecified type, Bright red blood per rectum, and Cholangiocarcinoma (HCC) were also pertinent to this visit.    Overnight/Interim Events:      SUBJECTIVE:  Feel ounces lighter, still pelvic line pain. No rectal bleeding. Supposed to have infusion tmrw as o/p.  No LH/dizziness/cp.     OBJECTIVE:  Temp:  [97.5 °F (36.4 °C)-98 °F (36.7 °C)] 98 °F (36.7 °C)  Pulse:  [65-81] 65  Resp:  [15-19] 16  BP: (113-132)/(55-74) 130/66  SpO2:  [88 %-100 %] 98 %    Intake/Output:    Intake/Output Summary (Last 24 hours) at 4/15/2025 1423  Last data filed at 4/15/2025 1236  Gross per 24 hour   Intake 1813 ml   Output 4550 ml   Net -2737 ml       Last 3 Weights   04/14/25 0636 200 lb (90.7 kg)   04/14/25 0625 200 lb (90.7 kg)   03/24/25 0803 202 lb (91.6 kg)   03/11/25 1712 202 lb (91.6 kg)   02/24/25 1041 205 lb (93 kg)   02/12/25 1050 205 lb (93 kg)       Exam    General: Alert, no distress, appears stated age.     Head:  Normocephalic, without obvious abnormality, atraumatic.   Eyes:  Sclera anicteric, EOMs intact.    Nose: Nares normal,  Mucosa normal    Throat: Lips normal   Neck: Supple, symmetrical, trachea midline   Lungs:   Clear to auscultation bilaterally. Normal effort   Chest wall:  No tenderness or deformity   Heart:  Regular rate and rhythm, S1, S2 normal, no murmur, rub or gallop appreciated   Abdomen:   Soft, NT/ND, Bowel sounds normal. No masses,  No organomegaly.  +fluid wave    Extremities: Extremities normal, atraumatic, no cyanosis or LE edema.   Skin: Skin color, texture, turgor normal. No rashes or lesions.    Neurologic: Moving all extremities spontaneously, no focal deficit appreciated      Data Review:       Labs:     Recent Labs   Lab 04/14/25  0739 04/15/25  0557   WBC 7.4 9.4   HGB 10.7* 9.5*   MCV  86.7 85.8   .0 290.0   INR 2.79* 1.92*       Recent Labs   Lab 04/14/25  0739 04/15/25  0557    139   K 4.4 4.8    107   CO2 25.0 24.0   BUN 23 24*   CREATSERUM 1.17 1.21   CA 9.9 9.4   MG  --  1.9   * 149*       Recent Labs   Lab 04/14/25  0739   ALT 16   AST 20   ALB 3.7       No results for input(s): \"PGLU\" in the last 168 hours.    No results for input(s): \"TROP\" in the last 168 hours.      Meds:     Scheduled Medications[1]  Medication Infusions[2]  PRN Medications[3]       Assessment/Plan:     74 year old male with PMH including but not limited to HTN, HL, Cholangiocarcinoma, KAREN, GERD who p/t [facility] ED c urinary retention.         # urinary retention  - NPO, d/w RAFAELA Carreon and Dr. Luna  -cystoscopy, (complicated) placement of wilkerson catheter (over wire) 4/14  - Ucx NG <18hrs  - follow PVR  - flomax  -Continue with wilkerson catheter to gravity drainage.    - F/U with urology in 1 month for catheter removal or exchange.     # Oliguria  - IVF, follow u/o and cr      Cholangiocarcinoma with peritoneal mets and recent recurrent cholangitis secondary to common hepatic duct stricture extending to right main hepatic duct with placement of 10 fr x 12 cm plastic biliary stent on 3/24/25  Ascites   brbpr.      - GI following, apprec, d/w Duane Steel MD   - ultrasound abd c mod degress ascites  - CMP mostly ok  - s/p para 4/15; f/u studies   - ok to resume xarelto now  - proctozone cream BID PRN for bleeding   - onc c/s   - send blood cxs for completeness        Anemia  - hgb 10.7 to 9.5, likely 2/2 malignancy      # HL  -statin     # GERD  -PPI, wean as o/p if appropriate     # Major Depression/ Generalized anxiety d/o   -reviewed home meds, continue SSRI currently appears stable         # HTN  - resume ace in am    # Proph  - scds     Dispo: med onc, dc planning    Questions/concerns were discussed with patient and wife by bedside. D/w RN    Total Time spent with patient and coordinating care:   55 minutes    Manuel Rossi MD  [facility] Hospitalist  [phone]  4/15/2025  2:23 PM           [1]    pantoprazole  20 mg Oral QAM AC    tamsulosin  0.4 mg Oral BID    atorvastatin  10 mg Oral Nightly    venlafaxine ER  37.5 mg Oral Nightly   [2]    sodium chloride 100 mL/hr at 04/15/25 0400   [3]   acetaminophen    hydrocortisone

## 2025-04-15 NOTE — PROGRESS NOTES
[facility]    Bruce R Salus  10/31/1950  [id number]   Provider:  @LOC@  Arlene Bruce MD  [unfilled]       Had para today  Allergies:  Allergies[1]     Current Outpatient Prescriptions:  Current Hospital Medications[2]       HISTORY:  Past Medical History[3]   Past Surgical History[4]   Family History[5]   Short Social Hx on File[6]            REVIEW OF SYSTEMS:   10 point ros reviewed. Pertinent positive per HPI.'    EXAM:   /55 (BP Location: Right arm)   Pulse 75   Temp 97.9 °F (36.6 °C) (Oral)   Resp 15   Ht 5' 11\" (1.803 m)   Wt 200 lb (90.7 kg)   SpO2 98%   BMI 27.89 kg/m²  Body mass index is 27.89 kg/m².    GENERAL: Well developed, well nourished, in no obvious distress.   CV: RRR  PULM: CTAB  ABDOMEN: Soft, no distention, Non-tender.   EXTREMITIES: No peripheral edema appreciated.   NEURO: Alert and Oriented x 3.      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 Hgb relatively stable and normocytic. Elevated INR. Platelets normal. On xarelto for DVT. AST, ALT, bili, alk phos normal. BRBPR likely internal hemorrhoids. No sign of recurrent cholangitis. Ascites likely malignant.US para today.     - will f/u cell count and diff, culture, cytology, albumin, protein)   - restart xarelto  -  proctozone cream twice daily as needed for bleeding  - no indication for colonoscopy  - ERCP July with Dr. Betts otherwise no GI follow up needed  - follow up with Dr. Cassidy oncologist, if recurrent ascites they may need to set up q2 week para prn    Will sign off at this time.     Duane Steel MD  [facility]           [1]   Allergies  Allergen Reactions    Chlorhexidine RASH     Clarissa body wipes   [2]   Current Facility-Administered Medications:     acetaminophen (Tylenol Extra Strength) tab 500 mg, 500 mg, Oral, Q4H PRN    [START ON 4/15/2025] pantoprazole (Protonix) DR tab 20 mg, 20 mg, Oral, QAM AC    tamsulosin (Flomax) cap 0.4 mg, 0.4 mg, Oral, BID    atorvastatin (Lipitor) tab 10 mg, 10 mg, Oral, Nightly    hydrocortisone (Anusol-HC) 2.5 % rectal cream, , Rectal, BID PRN    venlafaxine ER (Effexor-XR) 24 hr cap 37.5 mg, 37.5 mg, Oral, Nightly    sodium chloride 0.9% infusion, , Intravenous, Continuous  [3]   Past Medical History:   Anxiety state    BPH (benign prostatic hyperplasia)    Calculus of kidney    Cancer (HCC)    bile duct cancer    Chemotherapy-induced nausea    Cholangiocarcinoma (HCC)    Deep vein thrombosis (HCC)    port cath had clot    Depression    Esophageal reflux    High blood pressure    High cholesterol    History of recent hospitalization    10 days per pt at [facility]- had high fever, found sepsis - treated w antibiotics/ no fever now    Hypercholesterolemia    HYPERTENSION    Hypertension    Kidney disease    LFT elevation    Metastasis to peritoneal cavity (HCC)    Moderate episode of recurrent major depressive disorder (HCC)    KAREN (obstructive sleep apnea)    mild, AHI 10, O2 jas 84%, AutoPAP 8-20    Other and unspecified hyperlipidemia    OTHER DISEASES    pneumonia after flu    Personal history of antineoplastic chemotherapy    last infusion.   JANUARY 22TH 2025    Pneumonia    Pneumonia due to organism    12 YEARS AGO    Reflux    Sleep apnea    cpap    Unspecified essential  hypertension    Visual impairment    GLASSES   [4]   Past Surgical History:  Procedure Laterality Date    Cholecystectomy      Closed rx nose fx w stabilizatn  03/21/2013    Procedure: CLOSED REDUCTION NASAL FRACTURE;  Surgeon: Lianna Luna MD;  Location: [facility]    Colonoscopy N/A 08/24/2021    Procedure: COLONOSCOPY, with polypectomy;  Surgeon: Gaurav Cruz MD;  Location: [facility]    Colonoscopy & polypectomy  10/07/2015    a small polyp was removed; ASC    Colonoscopy,remv lesn,snare N/A 10/07/2015    Procedure: COLONOSCOPY, POSSIBLE BIOPSY, POSSIBLE POLYPECTOMY 78215;  Surgeon: Gaurav Cruz MD;  Location: [facility]    Cystoscopy,insert ureteral stent  04/08/2013    Procedure: CYSTOSCOPY/URETEROSCOPY/STONE EXTRACTION;  Surgeon: Eduin Luna MD;  Location: [facility]    Cystouretero w/lithotripsy  04/08/2013    Procedure: LITHOTRIPSY HOLMIUM LASER WITH CYSTOSCOPY;  Surgeon: Eduin Luna MD;  Location: [facility]    Cystourethroscopy      Cystouretro w/stone remove  04/08/2013    Procedure: CYSTO, WITH INSERTION OF STENT;  Surgeon: Eduin Luna MD;  Location: [facility]    Ercp,diagnostic  10/2024    Other surgical history      KIDNEY STONE REMOVED     Other surgical history      excision of left renal cyst     Other surgical history  11/15/2012    Flow US, RBUS    Other surgical history  12/05/2013    flow us- DR. Wilson    Other surgical history  2016    Cystoscopy    Skin surgery  09/15/2020    [facility]-Left ear-Dr. RACHEL Hull     X-ray retrograde pyelogram  04/08/2013    Procedure: CYSTOSCOPY/URETEROSCOPY/STONE EXTRACTION;  Surgeon: Eduin Luna MD;  Location: [facility]   [5]   Family History  Problem Relation Age of Onset    Other (Other) Father         sepsis    Cancer Mother         lymphoma    Cancer Sister         thyroid CA   [6]   Social History  Socioeconomic History    Marital status:     Occupational History    Occupation: [employer]    Tobacco Use    Smoking status: Former     Current packs/day: 0.00     Average packs/day: 0.2 packs/day for 8.0 years (1.6 ttl pk-yrs)     Types: Cigarettes     Start date: 1973     Quit date: 1981     Years since quittin.3    Smokeless tobacco: Never   Vaping Use    Vaping status: Never Used   Substance and Sexual Activity    Alcohol use: Not Currently     Alcohol/week: 0.8 standard drinks of alcohol     Types: 1 Standard drinks or equivalent per week     Comment: rare    Drug use: Not Currently     Types: Cannabis    Sexual activity: Yes     Partners: Female     Comment:     Other Topics Concern     Service No    Blood Transfusions No    Caffeine Concern Yes    Hobby Hazards No    Stress Concern No    Special Diet Yes    Exercise Yes    Seat Belt Yes    Self-Exams Yes     Social Drivers of Health     Food Insecurity: No Food Insecurity (2025)    NCSS - Food Insecurity     Worried About Running Out of Food in the Last Year: No     Ran Out of Food in the Last Year: No   Transportation Needs: No Transportation Needs (2025)    NCSS - Transportation     Lack of Transportation: No   Housing Stability: Not At Risk (2025)    NCSS - Housing/Utilities     Has Housing: Yes     Worried About Losing Housing: No     Unable to Get Utilities: No

## 2025-04-15 NOTE — H&P (VIEW-ONLY)
[facility]  Report of Consultation    Bruce R Salus Patient Status:  Observation    10/31/1950 MRN [id number]   Location [facility] 3NW-A Attending Suleman Tejada MD   Hosp Day # 0 PCP Arlene Bruce MD     Reason for Consultation:  Hx of cholangiocarcinoma    History of Present Illness:  Bruce R Salus is a a(n) 74 year old male that presented to [facility] yesterday with urinary retention.  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   Had 10fr x 12 cm plastic biliary stent placed 3/24/2025.     Admitted with urinary retention, noted to have ascites and BRPBR.  Primary oncologist is Dr. Cassidy.  On Xarelto for port associated VTE.    During admission--seen by Urology.  Had cystoscopy and placement of FC over wire yesterday.    Seen by GI.  Ultrasound showed moderate degress ascites.   Paracentesis planned today, Xarelto held for this.    Per Dr. Cassidy--  Hematologic/Oncologic History:  CT done 2023 for follow up for renal cyst (removed in )  Incidentally found to have liver mass concerning for cholangiocarcinoma  Take for diagnostic laparoscopy and possible resection 3/1/2023  Found to have peritoneal metastasis  Genomic testing with [facility] 3/17/2023 showed NF1 mutation  Chemotherapy with gemcitabine, cispltain and durvulumab 3/16/2023-  Cisplatin held at cycle 3 due to shortage.  Transition to Imfinzi monotherapy 2023-2024  Progression noted on MRI  FOLFOX 7/15/2024- 2025     History:  Past Medical History[1]  Past Surgical History[2]  Family History[3]   reports that he quit smoking about 44 years ago. His smoking use included cigarettes. He started smoking about 52 years ago. He has a 1.6 pack-year smoking history. He has never used smokeless tobacco. He reports that he does not currently use alcohol after a past usage of about 0.8 standard drinks of  alcohol per week. He reports that he does not currently use drugs after having used the following drugs: Cannabis.    Allergies:  Allergies[4]    Medications:  Current Hospital Medications[5]    Review of Systems:  A 10-point review of systems was done with pertinent positives and negatives per the HPI.    Physical Exam:   Blood pressure 130/66, pulse 65, temperature 98 °F (36.7 °C), temperature source Oral, resp. rate 16, height 5' 11\" (1.803 m), weight 200 lb (90.7 kg), SpO2 98%.   General: Patient is alert and oriented x 3, not in acute distress.   HEENT: EOMs intact. PERRL. Oropharynx is clear.    Neck: No JVD. No palpable lymphadenopathy. Neck is supple.   Chest: Clear to auscultation.   Heart: Regular rate and rhythm.    Abdomen: Soft, non tender with good bowel sounds.   Extremities: Pedal pulses are present. No edema.   Neurological: Grossly intact.    Lymphatics: There is no palpable lymphadenopathy throughout in the cervical,            supraclavicular, axillary, or inguinal regions.       Laboratory Data:    Lab Results   Component Value Date    WBC 9.4 04/15/2025    HGB 9.5 04/15/2025    HCT 28.9 04/15/2025    .0 04/15/2025    CREATSERUM 1.21 04/15/2025    BUN 24 04/15/2025     04/15/2025    K 4.8 04/15/2025     04/15/2025    CO2 24.0 04/15/2025     04/15/2025    CA 9.4 04/15/2025    INR 1.92 04/15/2025    MG 1.9 04/15/2025       Imaging:  US ABDOMEN LIMITED (CPT=76705)  Result Date: 4/14/2025  CONCLUSION:  Moderate degree of ascites.   LOCATION:  [facility]   Dictated by (CST): Cally Frye MD on 4/14/2025 at 3:48 PM     Finalized by (CST): Cally Frye MD on 4/14/2025 at 3:49 PM       XR ENDO BILE DUCT (CPT=74328)  Result Date: 2/24/2025  CONCLUSION:  See above.   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  Has been seen by both Urology and GI during admission  Paracentesis planned today      Plan  Hold chemotherapy while inpatient  He was due for chemotherapy later this week, this will be on hold for now  Await paracentesis and cytology from this  I have reviewed with Urology team today  I have updated his wife  I will update Dr. Cassidy today.     Thank you for allowing me to participate in the care of your patient.    Jaime Ortiz MD  4/15/2025  9:31 AM         [1]   Past Medical History:   Anxiety state    BPH (benign prostatic hyperplasia)    Calculus of kidney    Cancer (HCC)    bile duct cancer    Chemotherapy-induced nausea    Cholangiocarcinoma (HCC)    Deep vein thrombosis (HCC)    port cath had clot    Depression    Esophageal reflux    High blood pressure    High cholesterol    History of recent hospitalization    10 days per pt at [facility]- had high fever, found sepsis - treated w antibiotics/ no fever now    Hypercholesterolemia    HYPERTENSION    Hypertension    Kidney disease    LFT elevation    Metastasis to peritoneal cavity (HCC)    Moderate episode of recurrent major depressive disorder (HCC)    KAREN (obstructive sleep apnea)    mild, AHI 10, O2 jas 84%, AutoPAP 8-20    Other and unspecified hyperlipidemia    OTHER DISEASES    pneumonia after flu    Personal history of antineoplastic chemotherapy    last infusion.   JANUARY 22TH 2025    Pneumonia    Pneumonia due to organism    12 YEARS AGO    Reflux    Sleep apnea    cpap    Unspecified essential hypertension    Visual impairment    GLASSES   [2]   Past Surgical History:  Procedure Laterality Date    Cholecystectomy      Closed rx nose fx w stabilizatn  03/21/2013    Procedure: CLOSED REDUCTION NASAL FRACTURE;  Surgeon: Lianna Luna MD;  Location: [facility]  [facility]    Colonoscopy N/A 08/24/2021    Procedure: COLONOSCOPY, with polypectomy;  Surgeon: Gaurav Cruz MD;  Location: [facility]    Colonoscopy & polypectomy  10/07/2015    a small polyp was removed; ASC    Colonoscopy,remv lesn,snare N/A 10/07/2015    Procedure: COLONOSCOPY, POSSIBLE BIOPSY, POSSIBLE POLYPECTOMY 11993;  Surgeon: Gaurav Cruz MD;  Location: [facility]    Cystoscopy,insert ureteral stent  04/08/2013    Procedure: CYSTOSCOPY/URETEROSCOPY/STONE EXTRACTION;  Surgeon: Eduin Luna MD;  Location: [facility]    Cystouretero w/lithotripsy  04/08/2013    Procedure: LITHOTRIPSY HOLMIUM LASER WITH CYSTOSCOPY;  Surgeon: Eduin Luna MD;  Location: [facility]    Cystourethroscopy      Cystouretro w/stone remove  04/08/2013    Procedure: CYSTO, WITH INSERTION OF STENT;  Surgeon: Eduin Luna MD;  Location: [facility]    Ercp,diagnostic  10/2024    Other surgical history      KIDNEY STONE REMOVED     Other surgical history      excision of left renal cyst     Other surgical history  11/15/2012    Flow US, RBUS    Other surgical history  12/05/2013    flow us- DR. Wilson    Other surgical history  2016    Cystoscopy    Skin surgery  09/15/2020    [facility]-Dr. RACHEL Hull     X-ray retrograde pyelogram  04/08/2013    Procedure: CYSTOSCOPY/URETEROSCOPY/STONE EXTRACTION;  Surgeon: Eduin Luna MD;  Location: [facility]   [3]   Family History  Problem Relation Age of Onset    Other (Other) Father         sepsis    Cancer Mother         lymphoma    Cancer Sister         thyroid CA   [4]   Allergies  Allergen Reactions    Chlorhexidine RASH     Clarissa body wipes   [5]   Current Facility-Administered Medications:     acetaminophen (Tylenol Extra Strength) tab 500 mg, 500 mg, Oral, Q4H PRN    pantoprazole (Protonix) DR tab 20 mg, 20 mg, Oral, QAM AC    tamsulosin (Flomax) cap 0.4 mg, 0.4 mg, Oral, BID    atorvastatin  (Lipitor) tab 10 mg, 10 mg, Oral, Nightly    hydrocortisone (Anusol-HC) 2.5 % rectal cream, , Rectal, BID PRN    venlafaxine ER (Effexor-XR) 24 hr cap 37.5 mg, 37.5 mg, Oral, Nightly    sodium chloride 0.9% infusion, , Intravenous, Continuous  [6]   Patient Active Problem List  Diagnosis    Essential hypertension    KAREN on CPAP    History of kidney stones    Aorto-iliac atherosclerosis    BPH without obstruction/lower urinary tract symptoms    Elevated prostate specific antigen (PSA)    Moderate episode of recurrent major depressive disorder (HCC)    BMI 28.0-28.9,adult    History of nonmelanoma skin cancer    Urinary retention

## 2025-04-15 NOTE — PLAN OF CARE
Problem: Patient/Family Goals  Goal: Patient/Family Long Term Goal  Description: Patient's Long Term Goal: Discharge Home  Interventions: Pain Tolerance, Diet Tolerance, Return to normal ADL's   See additional Care Plan goals for specific interventions  Outcome: Progressing  Goal: Patient/Family Short Term Goal  Description: Patient's Short Term Goal: Prepare to Discharge Home  Interventions: Transition from IV to oral pain medications, Advance diet as tolerated, encourage ambulation,   See additional Care Plan goals for specific interventions  Outcome: Progressing

## 2025-04-15 NOTE — PROGRESS NOTES
[facility]  Urology Progress Note    Bruce R Salus Patient Status:  Observation    10/31/1950 MRN [id number]   Location [facility] 3NW-A Attending Suleman Tejada MD   Hosp Day # 0 PCP Arlene Bruce MD     Subjective:  Bruce R Salus is a(n) 74 year old male.    S/P cystoscopy,(complicated) placement of wilkerson catheter (over wire) 25 with Dr. Luna    Current complaints: Reports abdominal pressure.  No fever.    Plans for paracentesis today     Objective:  General appearance: alert, appears stated age, and cooperative  Blood pressure 132/74, pulse 70, temperature 97.5 °F (36.4 °C), temperature source Oral, resp. rate 17, height 5' 11\" (1.803 m), weight 200 lb (90.7 kg), SpO2 97%.  Lungs: non-labored respirations  Abdomen: soft  : wilkerson catheter in place draining darker yellow urine (UOP - 830 mL total yesterday)  Lab Results   Component Value Date    WBC 7.4 2025    HGB 10.7 2025    HCT 32.7 2025    .0 2025    CREATSERUM 1.17 2025    BUN 23 2025     2025    K 4.4 2025     2025    CO2 25.0 2025     2025    CA 9.9 2025    ALB 3.7 2025    ALKPHO 107 2025    BILT 0.5 2025    TP 6.5 2025    AST 20 2025    ALT 16 2025    PTT 47.1 2025    INR 2.79 2025    PTP 29.6 2025       US ABDOMEN LIMITED (CPT=76705)  Result Date: 2025  CONCLUSION:  Moderate degree of ascites.   LOCATION:  [facility]   Dictated by (CST): Cally Frye MD on 2025 at 3:48 PM     Finalized by (CST): Cally Frye MD on 2025 at 3:49 PM          Assessment:    DIFFICULTY VOIDING, BPH  S/P cystoscopy,(complicated) placement of wilkerson catheter (over wire) 25 with Dr. Luna  Surgical Findings: no urethral stricture, lateral lobe enlargement. High riding bladder neck.  Scope passes without obstruction, however coude catheter does not pass easily past prostatic  fossa/bladder neck to get into the bladder. Passed with wire in place for guidance. Return of urine clear.  Residual urine was only 250ml, not 1300ml, suspect the rest of the fluid detected on PVR machine is his ascites leading to abdominal distension  Afebrile  No leukocytosis  Serum creat 1.17  Urine culture 4/14/25: pending  2/2 blood cultures 4/15/25: pending    Plan:    Check final cultures  Follow labs, temp, UOP  Continue with wilkerson catheter to gravity drainage.    F/U with urology in 1 month for catheter removal or exchange.    Will follow peripherally.    Above discussed with patient, nurse, Dr. Jeremy Carreon PA-C  [facility]  Department of Urology  4/15/2025  6:04 AM

## 2025-04-15 NOTE — CONSULTS
[facility]  Report of Consultation    Bruce R Salus Patient Status:  Observation    10/31/1950 MRN [id number]   Location [facility] 3NW-A Attending Suleman Tejada MD   Hosp Day # 0 PCP Arlene Bruce MD     Reason for Consultation:  Hx of cholangiocarcinoma    History of Present Illness:  Bruce R Salus is a a(n) 74 year old male that presented to [facility] yesterday with urinary retention.  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   Had 10fr x 12 cm plastic biliary stent placed 3/24/2025.     Admitted with urinary retention, noted to have ascites and BRPBR.  Primary oncologist is Dr. Cassidy.  On Xarelto for port associated VTE.    During admission--seen by Urology.  Had cystoscopy and placement of FC over wire yesterday.    Seen by GI.  Ultrasound showed moderate degress ascites.   Paracentesis planned today, Xarelto held for this.    Per Dr. 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  Transition to Imfinzi monotherapy 2023-2024  Progression noted on MRI  FOLFOX 7/15/2024- 2025     History:  Past Medical History[1]  Past Surgical History[2]  Family History[3]   reports that he quit smoking about 44 years ago. His smoking use included cigarettes. He started smoking about 52 years ago. He has a 1.6 pack-year smoking history. He has never used smokeless tobacco. He reports that he does not currently use alcohol after a past usage of about 0.8 standard drinks of  alcohol per week. He reports that he does not currently use drugs after having used the following drugs: Cannabis.    Allergies:  Allergies[4]    Medications:  Current Hospital Medications[5]    Review of Systems:  A 10-point review of systems was done with pertinent positives and negatives per the HPI.    Physical Exam:   Blood pressure 130/66, pulse 65, temperature 98 °F (36.7 °C), temperature source Oral, resp. rate 16, height 5' 11\" (1.803 m), weight 200 lb (90.7 kg), SpO2 98%.   General: Patient is alert and oriented x 3, not in acute distress.   HEENT: EOMs intact. PERRL. Oropharynx is clear.    Neck: No JVD. No palpable lymphadenopathy. Neck is supple.   Chest: Clear to auscultation.   Heart: Regular rate and rhythm.    Abdomen: Soft, non tender with good bowel sounds.   Extremities: Pedal pulses are present. No edema.   Neurological: Grossly intact.    Lymphatics: There is no palpable lymphadenopathy throughout in the cervical,            supraclavicular, axillary, or inguinal regions.       Laboratory Data:    Lab Results   Component Value Date    WBC 9.4 04/15/2025    HGB 9.5 04/15/2025    HCT 28.9 04/15/2025    .0 04/15/2025    CREATSERUM 1.21 04/15/2025    BUN 24 04/15/2025     04/15/2025    K 4.8 04/15/2025     04/15/2025    CO2 24.0 04/15/2025     04/15/2025    CA 9.4 04/15/2025    INR 1.92 04/15/2025    MG 1.9 04/15/2025       Imaging:  US ABDOMEN LIMITED (CPT=76705)  Result Date: 4/14/2025  CONCLUSION:  Moderate degree of ascites.   LOCATION:  [facility]   Dictated by (CST): Cally Frye MD on 4/14/2025 at 3:48 PM     Finalized by (CST): Cally Frye MD on 4/14/2025 at 3:49 PM       XR ENDO BILE DUCT (CPT=74328)  Result Date: 2/24/2025  CONCLUSION:  See above.   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  Has been seen by both Urology and GI during admission  Paracentesis planned today      Plan  Hold chemotherapy while inpatient  He was due for chemotherapy later this week, this will be on hold for now  Await paracentesis and cytology from this  I have reviewed with Urology team today  I have updated his wife  I will update Dr. Cassidy today.     Thank you for allowing me to participate in the care of your patient.    Jaime Ortiz MD  4/15/2025  9:31 AM         [1]   Past Medical History:   Anxiety state    BPH (benign prostatic hyperplasia)    Calculus of kidney    Cancer (HCC)    bile duct cancer    Chemotherapy-induced nausea    Cholangiocarcinoma (HCC)    Deep vein thrombosis (HCC)    port cath had clot    Depression    Esophageal reflux    High blood pressure    High cholesterol    History of recent hospitalization    10 days per pt at [facility]- had high fever, found sepsis - treated w antibiotics/ no fever now    Hypercholesterolemia    HYPERTENSION    Hypertension    Kidney disease    LFT elevation    Metastasis to peritoneal cavity (HCC)    Moderate episode of recurrent major depressive disorder (HCC)    KAREN (obstructive sleep apnea)    mild, AHI 10, O2 jas 84%, AutoPAP 8-20    Other and unspecified hyperlipidemia    OTHER DISEASES    pneumonia after flu    Personal history of antineoplastic chemotherapy    last infusion.   JANUARY 22TH 2025    Pneumonia    Pneumonia due to organism    12 YEARS AGO    Reflux    Sleep apnea    cpap    Unspecified essential hypertension    Visual impairment    GLASSES   [2]   Past Surgical History:  Procedure Laterality Date    Cholecystectomy      Closed rx nose fx w stabilizatn  03/21/2013    Procedure: CLOSED REDUCTION NASAL FRACTURE;  Surgeon: Lianna Luna MD;  [facility]    Colonoscopy N/A 08/24/2021    Procedure: COLONOSCOPY, with polypectomy;  Surgeon: Gaurav Cruz MD;  Location: [facility]    Colonoscopy & polypectomy  10/07/2015    a small polyp was removed; [facility]    Colonoscopy,remv lesn,snare N/A 10/07/2015    Procedure: COLONOSCOPY, POSSIBLE BIOPSY, POSSIBLE POLYPECTOMY 92346;  Surgeon: Gaurav Cruz MD;  Location: [facility]    Cystoscopy,insert ureteral stent  04/08/2013    Procedure: CYSTOSCOPY/URETEROSCOPY/STONE EXTRACTION;  Surgeon: Eduin Luna MD;  Location: [facility]    Cystouretero w/lithotripsy  04/08/2013    Procedure: LITHOTRIPSY HOLMIUM LASER WITH CYSTOSCOPY;  Surgeon: Eduin Luna MD;  Location: [facility]    Cystourethroscopy      Cystouretro w/stone remove  04/08/2013    Procedure: CYSTO, WITH INSERTION OF STENT;  Surgeon: Eduin Luna MD;  Location: [facility]    Ercp,diagnostic  10/2024    Other surgical history      KIDNEY STONE REMOVED     Other surgical history      excision of left renal cyst     Other surgical history  11/15/2012    Flow US, RBUS    Other surgical history  12/05/2013    flow us- DR. Wilson    Other surgical history  2016    Cystoscopy    Skin surgery  09/15/2020    [facility]-Left ear-Dr. RACHEL Hull     X-ray retrograde pyelogram  04/08/2013    Procedure: CYSTOSCOPY/URETEROSCOPY/STONE EXTRACTION;  Surgeon: Eduin Luna MD;  Location: [facility]   [3]   Family History  Problem Relation Age of Onset    Other (Other) Father         sepsis    Cancer Mother         lymphoma    Cancer Sister         thyroid CA   [4]   Allergies  Allergen Reactions    Chlorhexidine RASH     Clarissa body wipes   [5]   Current Facility-Administered Medications:     acetaminophen (Tylenol Extra Strength) tab 500 mg, 500 mg, Oral, Q4H PRN    pantoprazole (Protonix) DR tab 20 mg, 20 mg, Oral, QAM AC    tamsulosin (Flomax) cap 0.4 mg, 0.4 mg, Oral, BID    atorvastatin  (Lipitor) tab 10 mg, 10 mg, Oral, Nightly    hydrocortisone (Anusol-HC) 2.5 % rectal cream, , Rectal, BID PRN    venlafaxine ER (Effexor-XR) 24 hr cap 37.5 mg, 37.5 mg, Oral, Nightly    sodium chloride 0.9% infusion, , Intravenous, Continuous  [6]   Patient Active Problem List  Diagnosis    Essential hypertension    KAREN on CPAP    History of kidney stones    Aorto-iliac atherosclerosis    BPH without obstruction/lower urinary tract symptoms    Elevated prostate specific antigen (PSA)    Moderate episode of recurrent major depressive disorder (HCC)    BMI 28.0-28.9,adult    History of nonmelanoma skin cancer    Urinary retention

## 2025-04-15 NOTE — PLAN OF CARE
Patient ambulating in room. VSS. Denies chest/calf pain. Abdomen rounded, firm. Calvo in place. Paracentesis this am. POC discussed, all questions and concerns addressed. All safety measures in place.

## 2025-04-15 NOTE — PROGRESS NOTES
Neuro: Alert and oriented x4.   Vital signs stable  Respiratory: On room air. Continuous pulse oximeter. KAREN- CPAP machine from home  Cardiac: Tele-NSR  GI: Abdomen firm, distended. Denies nausea. Passing gas. Last BM: 4/14  : Calvo in place draining clear yellow urine  Integumentary: intact  Drains: none  Pain: controlled with PRN pain medications  Activity: Up with standby assist.  Diet: Regular diet. Clears after midnight  IV Fluids: infusing per order. 0.9 @ 100  All appropriate safety measures in place. All questions and concerns addressed.

## 2025-04-16 ENCOUNTER — APPOINTMENT (OUTPATIENT)
Dept: CT IMAGING | Facility: HOSPITAL | Age: 75
DRG: 374 | End: 2025-04-16
Attending: INTERNAL MEDICINE
Payer: MEDICARE

## 2025-04-16 LAB
ANION GAP SERPL CALC-SCNC: 6 MMOL/L (ref 0–18)
BUN BLD-MCNC: 20 MG/DL (ref 9–23)
CALCIUM BLD-MCNC: 9.2 MG/DL (ref 8.7–10.6)
CHLORIDE SERPL-SCNC: 109 MMOL/L (ref 98–112)
CO2 SERPL-SCNC: 26 MMOL/L (ref 21–32)
CREAT BLD-MCNC: 0.99 MG/DL (ref 0.7–1.3)
EGFRCR SERPLBLD CKD-EPI 2021: 79 ML/MIN/1.73M2 (ref 60–?)
ERYTHROCYTE [DISTWIDTH] IN BLOOD BY AUTOMATED COUNT: 15.8 %
GLUCOSE BLD-MCNC: 95 MG/DL (ref 70–99)
HCT VFR BLD AUTO: 30.5 % (ref 39–53)
HGB BLD-MCNC: 9.9 G/DL (ref 13–17.5)
INR BLD: 2.84 (ref 0.8–1.2)
MAGNESIUM SERPL-MCNC: 1.8 MG/DL (ref 1.6–2.6)
MCH RBC QN AUTO: 27.9 PG (ref 26–34)
MCHC RBC AUTO-ENTMCNC: 32.5 G/DL (ref 31–37)
MCV RBC AUTO: 85.9 FL (ref 80–100)
OSMOLALITY SERPL CALC.SUM OF ELEC: 294 MOSM/KG (ref 275–295)
PLATELET # BLD AUTO: 280 10(3)UL (ref 150–450)
POTASSIUM SERPL-SCNC: 4.5 MMOL/L (ref 3.5–5.1)
PROTHROMBIN TIME: 30.1 SECONDS (ref 11.6–14.8)
RBC # BLD AUTO: 3.55 X10(6)UL (ref 3.8–5.8)
SODIUM SERPL-SCNC: 141 MMOL/L (ref 136–145)
WBC # BLD AUTO: 4.8 X10(3) UL (ref 4–11)

## 2025-04-16 PROCEDURE — 74177 CT ABD & PELVIS W/CONTRAST: CPT | Performed by: INTERNAL MEDICINE

## 2025-04-16 RX ORDER — MAGNESIUM OXIDE 400 MG/1
400 TABLET ORAL ONCE
Status: COMPLETED | OUTPATIENT
Start: 2025-04-16 | End: 2025-04-16

## 2025-04-16 NOTE — PROGRESS NOTES
Brief GI Note    WBC > 500 and PMN > 250. Started ceftriaxone. Recommend 5 day course for SBP. Upon discharge, transition to ciprofloxacin twice daily to finish total of 5 day course. I will f/u culture, cytology, path.     Duane Steel MD  [facility]

## 2025-04-16 NOTE — PROGRESS NOTES
Neuro: Alert and oriented x4.   Vital signs stable  Respiratory: On room air. Continuous pulse oximeter. KAREN with CPAP from home  Cardiac: Tele-NSR  GI: Abdomen soft, rounded. Denies nausea. Passing gas. Last BM: 4/15  : Calvo in place draining clear yellow urine  Integumentary: Paracentesis site in RLQ with gauze and tegaderm  Drains: foleuy  Pain: controlled with PRN pain medications  Activity: Up with standby assist.  Diet: regular  IV Fluids: infusing per order. 0.9@ 100  All appropriate safety measures in place. All questions and concerns addressed.

## 2025-04-16 NOTE — PROGRESS NOTES
[facility] Hospitalist Progress Note     PCP: Arlene Bruce MD    Chief Complaint: follow-up   Follow up for: The primary encounter diagnosis was Urinary retention. Diagnoses of Urine retention, Anemia, unspecified type, Bright red blood per rectum, and Cholangiocarcinoma (HCC) were also pertinent to this visit.    Overnight/Interim Events:      SUBJECTIVE:  Eating breakfast. No f/c. Pins/needles in fingers.       OBJECTIVE:  Temp:  [97.7 °F (36.5 °C)-98.4 °F (36.9 °C)] 98 °F (36.7 °C)  Pulse:  [68-75] 68  Resp:  [15-18] 18  BP: (108-132)/(55-75) 130/75  SpO2:  [90 %-95 %] 95 %    Intake/Output:    Intake/Output Summary (Last 24 hours) at 4/16/2025 1035  Last data filed at 4/16/2025 0622  Gross per 24 hour   Intake 1275 ml   Output 4650 ml   Net -3375 ml       Last 3 Weights   04/16/25 0622 200 lb 14.4 oz (91.1 kg)   04/14/25 0636 200 lb (90.7 kg)   04/14/25 0625 200 lb (90.7 kg)   03/24/25 0803 202 lb (91.6 kg)   03/11/25 1712 202 lb (91.6 kg)   02/24/25 1041 205 lb (93 kg)   02/12/25 1050 205 lb (93 kg)       Exam    General: Alert, no distress, appears stated age.     Head:  Normocephalic, without obvious abnormality, atraumatic.   Eyes:  Sclera anicteric, EOMs intact.    Nose: Nares normal,  Mucosa normal    Throat: Lips normal   Neck: Supple, symmetrical, trachea midline   Lungs:   Clear to auscultation bilaterally. Normal effort   Chest wall:  No tenderness or deformity   Heart:  Regular rate and rhythm, S1, S2 normal, no murmur, rub or gallop appreciated   Abdomen:   Soft, NT/ND, Bowel sounds normal. No masses,  No organomegaly.  +fluid wave    Extremities: Extremities normal, atraumatic, no cyanosis or LE edema.   Skin: Skin color, texture, turgor normal. No rashes or lesions.    Neurologic: Moving all extremities spontaneously, no focal deficit appreciated      Data Review:       Labs:     Recent Labs   Lab 04/14/25  0739 04/15/25  0557   WBC 7.4 9.4   HGB 10.7* 9.5*   MCV 86.7 85.8   .0 290.0    INR 2.79* 1.92*       Recent Labs   Lab 04/14/25  0739 04/15/25  0557    139   K 4.4 4.8    107   CO2 25.0 24.0   BUN 23 24*   CREATSERUM 1.17 1.21   CA 9.9 9.4   MG  --  1.9   * 149*       Recent Labs   Lab 04/14/25  0739   ALT 16   AST 20   ALB 3.7       No results for input(s): \"PGLU\" in the last 168 hours.    No results for input(s): \"TROP\" in the last 168 hours.      Meds:     Scheduled Medications[1]  Medication Infusions[2]  PRN Medications[3]       Assessment/Plan:     74 year old male with PMH including but not limited to HTN, HL, Cholangiocarcinoma, KAREN, GERD who p/t [facility] c urinary retention.         # urinary retention  - NPO, d/w RAFAELA Carreon and Dr. Luna  -cystoscopy, (complicated) placement of wilkerson catheter (over wire) 4/14  - Ucx NG <18hrs  - follow PVR  - flomax  -Continue with wilkerson catheter to gravity drainage.    - F/U with urology in 1 month for catheter removal or exchange. D/w RAFAELA Carreon      # Oliguria  - IVF, follow u/o and cr      Cholangiocarcinoma with peritoneal mets and recent recurrent cholangitis secondary to common hepatic duct stricture extending to right main hepatic duct with placement of 10 fr x 12 cm plastic biliary stent on 3/24/25  Ascites   brbpr.      - GI following, apprec  - ultrasound abd c mod degress ascites  - CMP mostly ok  - s/p para 4/15; f/u studies   - WBCs 1083, 54% N, empiric CTX started   - ok to resume xarelto now  - proctozone cream BID PRN for bleeding   - onc c/s   - send blood cxs for completeness; one set c GPC in clusters, assume contaminant, re-sent blood cxs    - d/w MD Angel and MD Milvia, check CT a/p to assess progression     Anemia  - hgb 10.7 to 9.5 to 9.9, likely 2/2 malignancy      # HL  -statin     # GERD  -PPI, wean as o/p if appropriate     # Major Depression/ Generalized anxiety d/o   -reviewed home meds, continue SSRI currently appears stable         # HTN  - resume ace in am    # Proph  - scds     Dispo: med  onc      Questions/concerns were discussed with patient by bedside. Later d/w wife and dtr outside room, gave update as pt sleeping. D/w RN. Total Time spent with patient and coordinating care:  80 minutes [id number] and [id number] including discussion of paracentesis, plan, blood cxs, and CT scan.     Manuel Rossi MD  [facility] Center[facility]  [phone]  4/16/2025  2:11 PM           [1]    rivaroxaban  20 mg Oral Daily with food    lisinopril  20 mg Oral Daily    cefTRIAXone  2 g Intravenous Q24H    pantoprazole  20 mg Oral QAM AC    tamsulosin  0.4 mg Oral BID    atorvastatin  10 mg Oral Nightly    venlafaxine ER  37.5 mg Oral Nightly   [2]    sodium chloride 100 mL/hr at 04/15/25 1605   [3]   acetaminophen    hydrocortisone

## 2025-04-16 NOTE — PLAN OF CARE
Patient resting in bed. Vss. Ambulating in room. Mild abdominal pain noted. Denies chest/calf pain. Abdomen rounded. Passing flatus. POC discussed with patient, all questions and concerns addressed.

## 2025-04-17 ENCOUNTER — APPOINTMENT (OUTPATIENT)
Dept: ULTRASOUND IMAGING | Facility: HOSPITAL | Age: 75
DRG: 374 | End: 2025-04-17
Attending: INTERNAL MEDICINE
Payer: MEDICARE

## 2025-04-17 LAB
ANION GAP SERPL CALC-SCNC: 7 MMOL/L (ref 0–18)
BUN BLD-MCNC: 20 MG/DL (ref 9–23)
CALCIUM BLD-MCNC: 9.2 MG/DL (ref 8.7–10.6)
CHLORIDE SERPL-SCNC: 109 MMOL/L (ref 98–112)
CO2 SERPL-SCNC: 25 MMOL/L (ref 21–32)
CREAT BLD-MCNC: 0.97 MG/DL (ref 0.7–1.3)
EGFRCR SERPLBLD CKD-EPI 2021: 81 ML/MIN/1.73M2 (ref 60–?)
ERYTHROCYTE [DISTWIDTH] IN BLOOD BY AUTOMATED COUNT: 16.2 %
GLUCOSE BLD-MCNC: 112 MG/DL (ref 70–99)
HCT VFR BLD AUTO: 30.6 % (ref 39–53)
HGB BLD-MCNC: 9.8 G/DL (ref 13–17.5)
INR BLD: 2.94 (ref 0.8–1.2)
MAGNESIUM SERPL-MCNC: 1.7 MG/DL (ref 1.6–2.6)
MCH RBC QN AUTO: 27.6 PG (ref 26–34)
MCHC RBC AUTO-ENTMCNC: 32 G/DL (ref 31–37)
MCV RBC AUTO: 86.2 FL (ref 80–100)
OSMOLALITY SERPL CALC.SUM OF ELEC: 295 MOSM/KG (ref 275–295)
PLATELET # BLD AUTO: 336 10(3)UL (ref 150–450)
POTASSIUM SERPL-SCNC: 4.4 MMOL/L (ref 3.5–5.1)
PROCALCITONIN SERPL-MCNC: 0.25 NG/ML (ref ?–0.05)
PROTHROMBIN TIME: 30.9 SECONDS (ref 11.6–14.8)
RBC # BLD AUTO: 3.55 X10(6)UL (ref 3.8–5.8)
SODIUM SERPL-SCNC: 141 MMOL/L (ref 136–145)
WBC # BLD AUTO: 5.1 X10(3) UL (ref 4–11)

## 2025-04-17 PROCEDURE — 49083 ABD PARACENTESIS W/IMAGING: CPT | Performed by: INTERNAL MEDICINE

## 2025-04-17 PROCEDURE — 0W9G3ZZ DRAINAGE OF PERITONEAL CAVITY, PERCUTANEOUS APPROACH: ICD-10-PCS | Performed by: RADIOLOGY

## 2025-04-17 RX ORDER — BISACODYL 10 MG
10 SUPPOSITORY, RECTAL RECTAL
Status: DISCONTINUED | OUTPATIENT
Start: 2025-04-17 | End: 2025-04-28

## 2025-04-17 RX ORDER — TRAMADOL HYDROCHLORIDE 50 MG/1
50 TABLET ORAL EVERY 6 HOURS PRN
Status: DISCONTINUED | OUTPATIENT
Start: 2025-04-17 | End: 2025-04-23

## 2025-04-17 RX ORDER — ONDANSETRON 2 MG/ML
4 INJECTION INTRAMUSCULAR; INTRAVENOUS ONCE AS NEEDED
Status: ACTIVE | OUTPATIENT
Start: 2025-04-17 | End: 2025-04-17

## 2025-04-17 RX ORDER — HYDROCODONE BITARTRATE AND ACETAMINOPHEN 5; 325 MG/1; MG/1
1 TABLET ORAL EVERY 6 HOURS PRN
Refills: 0 | Status: DISCONTINUED | OUTPATIENT
Start: 2025-04-17 | End: 2025-04-24

## 2025-04-17 RX ORDER — POLYETHYLENE GLYCOL 3350 17 G/17G
17 POWDER, FOR SOLUTION ORAL DAILY PRN
Status: DISCONTINUED | OUTPATIENT
Start: 2025-04-17 | End: 2025-04-28

## 2025-04-17 RX ORDER — DOCUSATE SODIUM 100 MG/1
100 CAPSULE, LIQUID FILLED ORAL 2 TIMES DAILY
Status: DISCONTINUED | OUTPATIENT
Start: 2025-04-17 | End: 2025-04-28

## 2025-04-17 NOTE — PROGRESS NOTES
[facility]  Follow up note    Bruce R Salus Patient Status:  Observation    10/31/1950 MRN [id number]   Location [facility] 3NW-A Attending Suleman Tejada MD   Hosp Day # 2 PCP Arlene Bruce MD     Interval history--  Ascites has re accumulated on exam today.  I have reviewed with Dr. Jean-Baptiste.  We plan on repeat paracentesis.  Reilly wishes to wait on abdominal pleurx for now.    Ascites fluid studies noted and plans for abx per GI noted.  Cytology remains pending    CT post paracentesis shows omental carcinomatosis.  D/W GI and Dr. Jean-Baptiste today        4/15/2025  Reason for Consultation:  Hx of cholangiocarcinoma    History of Present Illness:  Bruce R Salus is a a(n) 74 year old male that presented to [facility] yesterday with urinary retention.  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   Had 10fr x 12 cm plastic biliary stent placed 3/24/2025.     Admitted with urinary retention, noted to have ascites and BRPBR.  Primary oncologist is Dr. Cassidy.  On Xarelto for port associated VTE.    During admission--seen by Urology.  Had cystoscopy and placement of FC over wire yesterday.    Seen by GI.  Ultrasound showed moderate degress ascites.   Paracentesis planned today, Xarelto held for this.    Per Dr. 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  Transition to Imfinzi monotherapy 2023-2024  Progression noted on MRI  FOLFOX 7/15/2024- 2025   FOLFIRI started  4/2    History:  Past Medical History[1]  Past Surgical History[2]  Family History[3]   reports that he quit smoking about 44 years ago. His smoking use included cigarettes. He started smoking about 52 years ago. He has a 1.6 pack-year smoking history. He has never used smokeless tobacco. He reports that he does not currently use alcohol after a past usage of about 0.8 standard drinks of alcohol per week. He reports that he does not currently use drugs after having used the following drugs: Cannabis.    Allergies:  Allergies[4]    Medications:  Current Hospital Medications[5]    Review of Systems:  A 10-point review of systems was done with pertinent positives and negatives per the HPI.    Physical Exam:   Blood pressure 144/61, pulse 64, temperature 98.3 °F (36.8 °C), temperature source Oral, resp. rate 18, height 5' 11\" (1.803 m), weight 200 lb 13.4 oz (91.1 kg), SpO2 95%.   General: Patient is alert and oriented x 3, not in acute distress.   HEENT: EOMs intact. PERRL. Oropharynx is clear.    Neck: No JVD. No palpable lymphadenopathy. Neck is supple.   Chest: Clear to auscultation.   Heart: Regular rate and rhythm.    Abdomen: Soft, non tender with good bowel sounds.   Extremities: Pedal pulses are present. No edema.   Neurological: Grossly intact.    Lymphatics: There is no palpable lymphadenopathy throughout in the cervical,            supraclavicular, axillary, or inguinal regions.       Laboratory Data:    Lab Results   Component Value Date    WBC 4.8 04/16/2025    HGB 9.9 04/16/2025    HCT 30.5 04/16/2025    .0 04/16/2025    CREATSERUM 0.99 04/16/2025    BUN 20 04/16/2025     04/16/2025    K 4.5 04/16/2025     04/16/2025    CO2 26.0 04/16/2025    GLU 95 04/16/2025    CA 9.2 04/16/2025    INR 2.84 04/16/2025    MG 1.8 04/16/2025     Lab Results   Component Value Date    WBC 4.8 04/16/2025    HGB 9.9 (L) 04/16/2025    HCT 30.5 (L) 04/16/2025    .0 04/16/2025    NEPERCENT 83.4  04/15/2025    LYPERCENT 6.5 04/15/2025    MOPERCENT 9.5 04/15/2025    EOPERCENT 0.1 04/15/2025    BAPERCENT 0.2 04/15/2025    NE 7.86 (H) 04/15/2025    LYMABS 0.61 (L) 04/15/2025    MOABSO 0.90 04/15/2025    EOABSO 0.01 04/15/2025    BAABSO 0.02 04/15/2025          Imaging:  CT ABDOMEN+PELVIS(CONTRAST ONLY)(CPT=74177)  Result Date: 4/16/2025  CONCLUSION:  Interval development of omental carcinomatosis with large degree of abdominal and pelvic ascites when compared to 7/11/2024 CT scan.  There are now small bilateral pleural effusions with bibasilar atelectasis.  There is a heterogeneous infiltrating process in the left lobe of the liver with biliary ductal prominence without significant change.  Common bile duct stent in place.  Pancreatic ductal stent is no longer appreciated.  Stable CT appearance of the kidneys with bilateral cysts and nonobstructing calculi.   LOCATION:  [facility]   Dictated by (CST): Cally Frye MD on 4/16/2025 at 4:48 PM     Finalized by (CST): Cally Frye MD on 4/16/2025 at 4:59 PM       US PARACENTESIS W IMAGING (CPT=49083)  Result Date: 4/15/2025  CONCLUSION:  Ultrasound-guided paracentesis was performed without complication.   LOCATION:  [facility]     Dictated by (CST): Smooth Purcell MD on 4/15/2025 at 2:34 PM     Finalized by (CST): Smooth Purcell MD on 4/15/2025 at 2:35 PM       US ABDOMEN LIMITED (CPT=76705)  Result Date: 4/14/2025  CONCLUSION:  Moderate degree of ascites.   LOCATION:  [facility]   Dictated by (CST): Cally Frye MD on 4/14/2025 at 3:48 PM     Finalized by (CST): Cally Frye MD on 4/14/2025 at 3:49 PM       XR ENDO BILE DUCT (CPT=74328)  Result Date: 2/24/2025  CONCLUSION:  See above.   LOCATION:  [id number]   Dictated by (CST): Bro Feng MD on 2/24/2025 at 12:59 PM     Finalized by (CST): Bro Feng MD on 2/24/2025 at 1:00 PM          Impression and Plan:    Problem List[6]    Impression  Urinary retention  Ascites--recurrent post paracentesis earlier this wee4k.  Hx of  metastatic cholangiocarcinoma  Anemia 9.9 grams.  Normal WBC and platelet count.  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  Has been seen by both Urology and GI during admission  Repeat therapeutic paracentesis planned today if possible  We reviewed he may need an abdominal catheter placed for this soon.   He wishes to hold on this for now.      Plan  Hold chemotherapy while inpatient  He was due for chemotherapy later this week, this will be on hold for now  Await cytology  CT post initial 4 L paracentesis shows omental carcinomatosis, ascites, pleural effusions.  I have reviewed with Urology team today.  FC to remain in place for now.   Therapeutic paracentesis today and we plan to follow clinically.    Thank you for allowing me to participate in the care of your patient.    Jaime Ortiz MD           [1]   Past Medical History:   Anxiety state    BPH (benign prostatic hyperplasia)    Calculus of kidney    Cancer (HCC)    bile duct cancer    Chemotherapy-induced nausea    Cholangiocarcinoma (HCC)    Deep vein thrombosis (HCC)    port cath had clot    Depression    Esophageal reflux    High blood pressure    High cholesterol    History of recent hospitalization    10 days per pt at [facility]- had high fever, found sepsis - treated w antibiotics/ no fever now    Hypercholesterolemia    HYPERTENSION    Hypertension    Kidney disease    LFT elevation    Metastasis to peritoneal cavity (HCC)    Moderate episode of recurrent major depressive disorder (HCC)    KAREN (obstructive sleep apnea)    mild, AHI 10, O2 jas 84%, AutoPAP 8-20    Other and unspecified hyperlipidemia    OTHER DISEASES    pneumonia after flu    Personal history of antineoplastic chemotherapy    last infusion.   JANUARY 22TH 2025    Pneumonia    Pneumonia due to organism    12 YEARS AGO    Reflux    Sleep apnea    cpap    Unspecified essential hypertension    Visual  impairment    GLASSES   [2]   Past Surgical History:  Procedure Laterality Date    Cholecystectomy      Closed rx nose fx w stabilizatn  03/21/2013    Procedure: CLOSED REDUCTION NASAL FRACTURE;  Surgeon: Lianna Luna MD;  Location: [facility]    Colonoscopy N/A 08/24/2021    Procedure: COLONOSCOPY, with polypectomy;  Surgeon: Gaurav Cruz MD;  Location: [facility]    Colonoscopy & polypectomy  10/07/2015    a small polyp was removed; ASC    Colonoscopy,remv lesn,snare N/A 10/07/2015    Procedure: COLONOSCOPY, POSSIBLE BIOPSY, POSSIBLE POLYPECTOMY 85310;  Surgeon: Gaurav Cruz MD;  Location: [facility]    Cystoscopy,insert ureteral stent  04/08/2013    Procedure: CYSTOSCOPY/URETEROSCOPY/STONE EXTRACTION;  Surgeon: Eduin Luna MD;  Location: [facility]    Cystouretero w/lithotripsy  04/08/2013    Procedure: LITHOTRIPSY HOLMIUM LASER WITH CYSTOSCOPY;  Surgeon: Eduin Luna MD;  Location: [facility]    Cystourethroscopy      Cystouretro w/stone remove  04/08/2013    Procedure: CYSTO, WITH INSERTION OF STENT;  Surgeon: Eduin Luna MD;  Location: [facility]    Ercp,diagnostic  10/2024    Other surgical history      KIDNEY STONE REMOVED     Other surgical history      excision of left renal cyst     Other surgical history  11/15/2012    Flow US, RBUS    Other surgical history  12/05/2013    flow us- DR. Wilson    Other surgical history  2016    Cystoscopy    Skin surgery  09/15/2020    [facility]Left ear-Dr. RACHEL Hull     X-ray retrograde pyelogram  04/08/2013    Procedure: CYSTOSCOPY/URETEROSCOPY/STONE EXTRACTION;  Surgeon: Eduin Luna MD;  Location: [facility]   [3]   Family History  Problem Relation Age of Onset    Other (Other) Father         sepsis    Cancer Mother         lymphoma    Cancer Sister         thyroid CA   [4]   Allergies  Allergen Reactions    Chlorhexidine RASH     Clarissa body wipes   [5]    Current Facility-Administered Medications:     rivaroxaban (Xarelto) tab 20 mg, 20 mg, Oral, Daily with food    lisinopril (Prinivil; Zestril) tab 20 mg, 20 mg, Oral, Daily    cefTRIAXone (Rocephin) 2 g in sodium chloride 0.9% 100 mL IVPB-ADDV, 2 g, Intravenous, Q24H    acetaminophen (Tylenol Extra Strength) tab 500-1,000 mg, 500-1,000 mg, Oral, Q4H PRN    pantoprazole (Protonix) DR tab 20 mg, 20 mg, Oral, QAM AC    tamsulosin (Flomax) cap 0.4 mg, 0.4 mg, Oral, BID    atorvastatin (Lipitor) tab 10 mg, 10 mg, Oral, Nightly    hydrocortisone (Anusol-HC) 2.5 % rectal cream, , Rectal, BID PRN    venlafaxine ER (Effexor-XR) 24 hr cap 37.5 mg, 37.5 mg, Oral, Nightly    sodium chloride 0.9% infusion, , Intravenous, Continuous  [6]   Patient Active Problem List  Diagnosis    Essential hypertension    KAREN on CPAP    History of kidney stones    Aorto-iliac atherosclerosis    BPH without obstruction/lower urinary tract symptoms    Elevated prostate specific antigen (PSA)    Moderate episode of recurrent major depressive disorder (HCC)    BMI 28.0-28.9,adult    History of nonmelanoma skin cancer    Urinary retention

## 2025-04-17 NOTE — PLAN OF CARE
A&Ox4. VSS. KAREN, CPAP, . Denies chest pain and SOB.   Telemetry: NSR.   GI: Abdomen firm, distended. Passing gas. Had BM during shift   Denies nausea.   : Calvo in place (draining clear yellow urine)  Pain controlled with PRN Tylenol  Up ad melany  Diet: Regular   IVF running per order.   All appropriate safety measures in place. All questions and concerns addressed.    2 person skin check done with Erm, PCT- No skin breakdown noted

## 2025-04-17 NOTE — PROGRESS NOTES
[facility] Hospitalist Progress Note     PCP: Arlene Bruce MD    Chief Complaint: follow-up   Follow up for: The primary encounter diagnosis was Urinary retention. Diagnoses of Urine retention, Anemia, unspecified type, Bright red blood per rectum, and Cholangiocarcinoma (HCC) were also pertinent to this visit.    Overnight/Interim Events:      SUBJECTIVE:  Am set-back in lower abd pain. Taking some PO. Discussed plan.       OBJECTIVE:  Temp:  [98 °F (36.7 °C)-98.5 °F (36.9 °C)] 98.3 °F (36.8 °C)  Pulse:  [64-75] 64  Resp:  [18] 18  BP: (102-144)/(53-62) 144/61  SpO2:  [93 %-95 %] 95 %    Intake/Output:    Intake/Output Summary (Last 24 hours) at 4/17/2025 0944  Last data filed at 4/17/2025 0827  Gross per 24 hour   Intake 5315 ml   Output 1350 ml   Net 3965 ml       Last 3 Weights   04/17/25 0751 200 lb 13.4 oz (91.1 kg)   04/16/25 0622 200 lb 14.4 oz (91.1 kg)   04/14/25 0636 200 lb (90.7 kg)   04/14/25 0625 200 lb (90.7 kg)   03/24/25 0803 202 lb (91.6 kg)   03/11/25 1712 202 lb (91.6 kg)   02/24/25 1041 205 lb (93 kg)   02/12/25 1050 205 lb (93 kg)       Exam    General: Alert, no distress, appears stated age.     Head:  Normocephalic, without obvious abnormality, atraumatic.   Eyes:  Sclera anicteric, EOMs intact.    Nose: Nares normal,  Mucosa normal    Throat: Lips normal   Neck: Supple, symmetrical, trachea midline   Lungs:   Clear to auscultation bilaterally. Normal effort   Chest wall:  No tenderness or deformity   Heart:  Regular rate and rhythm, S1, S2 normal, no murmur, rub or gallop appreciated   Abdomen:   Soft, NT/ND, Bowel sounds normal. No masses,  No organomegaly.  +fluid wave    Extremities: Extremities normal, atraumatic, no cyanosis or LE edema.   Skin: Skin color, texture, turgor normal. No rashes or lesions.    Neurologic: Moving all extremities spontaneously, no focal deficit appreciated      Data Review:       Labs:     Recent Labs   Lab 04/14/25  0739 04/15/25  0557 04/16/25  1225    WBC 7.4 9.4 4.8   HGB 10.7* 9.5* 9.9*   MCV 86.7 85.8 85.9   .0 290.0 280.0   INR 2.79* 1.92* 2.84*       Recent Labs   Lab 04/14/25  0739 04/15/25  0557 04/16/25  1227    139 141   K 4.4 4.8 4.5    107 109   CO2 25.0 24.0 26.0   BUN 23 24* 20   CREATSERUM 1.17 1.21 0.99   CA 9.9 9.4 9.2   MG  --  1.9 1.8   * 149* 95       Recent Labs   Lab 04/14/25  0739   ALT 16   AST 20   ALB 3.7       No results for input(s): \"PGLU\" in the last 168 hours.    No results for input(s): \"TROP\" in the last 168 hours.      Meds:     Scheduled Medications[1]  Medication Infusions[2]  PRN Medications[3]       Assessment/Plan:     74 year old male with PMH including but not limited to HTN, HL, Cholangiocarcinoma, KAREN, GERD who p/t [facility] c urinary retention.         # urinary retention  - NPO, d/w RAFAELA Carreon and Dr. Luna  -cystoscopy, (complicated) placement of wilkerson catheter (over wire) 4/14  - Ucx NG <18hrs  - follow PVR  - flomax  -Continue with wilkerson catheter to gravity drainage.    - F/U with urology in 1 month for catheter removal or exchange.        # Oliguria  - IVF, follow u/o and cr      Cholangiocarcinoma with peritoneal mets and recent recurrent cholangitis secondary to common hepatic duct stricture extending to right main hepatic duct with placement of 10 fr x 12 cm plastic biliary stent on 3/24/25  Ascites   brbpr.      - GI following, apprec  - ultrasound abd c mod degress ascites  - CMP mostly ok  - s/p para 4/15; f/u studies; cytology pending   - WBCs 1083, 54% N, empiric CTX started   - resumed xarelto   - proctozone cream BID PRN for bleeding   - onc c/s   - sent blood cxs for completeness; one set c GPC in clusters, assume contaminant, re-sent blood cxs, NG x1d   - d/w MD Angel and MD Milvia, ascities has re-accumulated, repeat para; will need to set up o/p frances v pleurex  - CT a/p to assess progression reviewed c pt, omental carcinomatosis.    - PCT 0.25      Anemia  - hgb 10.7 to 9.5  to 9.9, likely 2/2 malignancy      # HL  -statin     # GERD  -PPI, wean as o/p if appropriate     # Major Depression/ Generalized anxiety d/o   -reviewed home meds, continue SSRI currently appears stable         # HTN  - resume ace in am    # Proph  - scds     Dispo: med onc, had refused labs earlier       Questions/concerns were discussed with patient by bedside. D/w RN. Total Time spent with patient and coordinating care:  55 minutes. [facility] hospitalist to resume care in AM, will discuss plan with them      Manuel Rossi MD  [facility] Hospitalist  [phone]  4/17/2025  7:50 PM         [1]    rivaroxaban  20 mg Oral Daily with food    lisinopril  20 mg Oral Daily    cefTRIAXone  2 g Intravenous Q24H    pantoprazole  20 mg Oral QAM AC    tamsulosin  0.4 mg Oral BID    atorvastatin  10 mg Oral Nightly    venlafaxine ER  37.5 mg Oral Nightly   [2]    sodium chloride 100 mL/hr at 04/15/25 1605   [3]   acetaminophen    hydrocortisone

## 2025-04-18 RX ORDER — MORPHINE SULFATE 2 MG/ML
1 INJECTION, SOLUTION INTRAMUSCULAR; INTRAVENOUS EVERY 2 HOUR PRN
Status: DISCONTINUED | OUTPATIENT
Start: 2025-04-18 | End: 2025-04-27

## 2025-04-18 RX ORDER — MORPHINE SULFATE 4 MG/ML
4 INJECTION, SOLUTION INTRAMUSCULAR; INTRAVENOUS EVERY 2 HOUR PRN
Status: DISCONTINUED | OUTPATIENT
Start: 2025-04-18 | End: 2025-04-24

## 2025-04-18 RX ORDER — MORPHINE SULFATE 2 MG/ML
2 INJECTION, SOLUTION INTRAMUSCULAR; INTRAVENOUS EVERY 2 HOUR PRN
Status: DISCONTINUED | OUTPATIENT
Start: 2025-04-18 | End: 2025-04-27

## 2025-04-18 RX ORDER — MAGNESIUM OXIDE 400 MG/1
400 TABLET ORAL ONCE
Status: COMPLETED | OUTPATIENT
Start: 2025-04-18 | End: 2025-04-18

## 2025-04-18 NOTE — PROGRESS NOTES
Alert & oriented x4. VSS on room air. Calvo in place, pt to discharge home with catheter. Tolerating diet. Ambulates with assist. Denies nausea/chest pain/SOB. Pain controlled. Patient updated on plan of care. Questions and concerns addressed. Safety precautions in place. Frequent rounds performed.  Order for IR pleurx cath placed by Dr. Ortiz, probably Monday per Dr. Ortiz.

## 2025-04-18 NOTE — PROGRESS NOTES
[facility] Hospitalist Progress Note     PCP: Arlene Bruce MD    Chief Complaint: follow-up   Follow up for: The primary encounter diagnosis was Urinary retention. Diagnoses of Urine retention, Anemia, unspecified type, Bright red blood per rectum, and Cholangiocarcinoma (HCC) were also pertinent to this visit.    Overnight/Interim Events:      SUBJECTIVE:  Patient seen and examined.  Sharp lower abdominal pain/spasms.  Tramadol with some benefit.  Denies CP/SOB.  NAD.       OBJECTIVE:  Temp:  [98 °F (36.7 °C)-98.8 °F (37.1 °C)] 98.6 °F (37 °C)  Pulse:  [66-79] 75  Resp:  [17-20] 20  BP: (113-154)/(52-64) 137/52  SpO2:  [90 %-98 %] 93 %    Intake/Output:    Intake/Output Summary (Last 24 hours) at 4/18/2025 1331  Last data filed at 4/18/2025 1300  Gross per 24 hour   Intake 1331 ml   Output 1025 ml   Net 306 ml       Last 3 Weights   04/17/25 0751 200 lb 13.4 oz (91.1 kg)   04/16/25 0622 200 lb 14.4 oz (91.1 kg)   04/14/25 0636 200 lb (90.7 kg)   04/14/25 0625 200 lb (90.7 kg)   03/24/25 0803 202 lb (91.6 kg)   03/11/25 1712 202 lb (91.6 kg)   02/24/25 1041 205 lb (93 kg)   02/12/25 1050 205 lb (93 kg)       Exam    General: Alert, no distress, appears stated age.     Head:  Normocephalic, without obvious abnormality, atraumatic.   Eyes:  Sclera anicteric, EOMs intact.    Nose: Nares normal,  Mucosa normal    Throat: Lips normal   Neck: Supple, symmetrical, trachea midline   Lungs:   Clear to auscultation bilaterally. Normal effort   Chest wall:  No tenderness or deformity   Heart:  Regular rate and rhythm, S1, S2 normal, no murmur, rub or gallop appreciated   Abdomen:   Soft, NT/ND, Bowel sounds normal. No masses,  No organomegaly.  +fluid wave    Extremities: Extremities normal, atraumatic, no cyanosis or LE edema.   Skin: Skin color, texture, turgor normal. No rashes or lesions.    Neurologic: Moving all extremities spontaneously, no focal deficit appreciated      Data Review:       Labs:     Recent Labs    Lab 04/14/25  0739 04/15/25  0557 04/16/25  1227 04/17/25  1516 04/17/25  1517   WBC 7.4 9.4 4.8  --  5.1   HGB 10.7* 9.5* 9.9*  --  9.8*   MCV 86.7 85.8 85.9  --  86.2   .0 290.0 280.0  --  336.0   INR 2.79* 1.92* 2.84* 2.94*  --        Recent Labs   Lab 04/14/25  0739 04/15/25  0557 04/16/25  1227 04/17/25  1516    139 141 141   K 4.4 4.8 4.5 4.4    107 109 109   CO2 25.0 24.0 26.0 25.0   BUN 23 24* 20 20   CREATSERUM 1.17 1.21 0.99 0.97   CA 9.9 9.4 9.2 9.2   MG  --  1.9 1.8 1.7   * 149* 95 112*       Recent Labs   Lab 04/14/25  0739   ALT 16   AST 20   ALB 3.7       No results for input(s): \"PGLU\" in the last 168 hours.    No results for input(s): \"TROP\" in the last 168 hours.      Meds:     Scheduled Medications[1]  Medication Infusions[2]  PRN Medications[3]       Assessment/Plan:     74 year old male with PMH including but not limited to HTN, HL, Cholangiocarcinoma, KAREN, GERD who p/t [facility] c urinary retention.         # urinary retention  - NPO, d/w RAFAELA Carreon and Dr. Luna  -cystoscopy, (complicated) placement of wilkerson catheter (over wire) 4/14  - Ucx NG <18hrs  - follow PVR  - flomax  -Continue with wilkerson catheter to gravity drainage.    - F/U with urology in 1 month for catheter removal or exchange.        # Oliguria  - IVF, follow u/o and cr   - will dc IVF given recurrent ascites and stable Cr      Cholangiocarcinoma with peritoneal mets and recent recurrent cholangitis secondary to common hepatic duct stricture extending to right main hepatic duct with placement of 10 fr x 12 cm plastic biliary stent on 3/24/25  Ascites   brbpr.   - GI following, apprec  - ultrasound abd c mod degress ascites  - CMP mostly ok  - s/p para 4/15; f/u studies; cytology pending   - WBCs 1083, 54% N, empiric CTX started  plan for 5d course followed by cipro per GI  - resumed xarelto   - proctozone cream BID PRN for bleeding   - onc c/s   - sent blood cxs for completeness; one set c GPC in  clusters, assume contaminant, re-sent blood cxs, NG x1d   - d/w MD Angel and MD Milvia, ascities has re-accumulated, repeat para; will need to set up o/p frances v pleurex - possible pleurex on Monday   - CT a/p to assess progression reviewed c pt, omental carcinomatosis.    - PCT 0.25      Anemia  - hgb 10.7 to 9.5 to 9.9, likely 2/2 malignancy      # HL  -statin     # GERD  -PPI, wean as o/p if appropriate     # Major Depression/ Generalized anxiety d/o   -reviewed home meds, continue SSRI currently appears stable         # HTN  - resume ace in am    # Proph  - scds     Dispo: med onc, had refused labs earlier       Jose Saenz MD  [facility]  Message over [employer]  Pager: [phone]         [1]    docusate sodium  100 mg Oral BID    rivaroxaban  20 mg Oral Daily with food    lisinopril  20 mg Oral Daily    cefTRIAXone  2 g Intravenous Q24H    pantoprazole  20 mg Oral QAM AC    tamsulosin  0.4 mg Oral BID    atorvastatin  10 mg Oral Nightly    venlafaxine ER  37.5 mg Oral Nightly   [2] [3]   morphINE **OR** morphINE **OR** morphINE    tiZANidine    traMADol    HYDROcodone-acetaminophen    polyethylene glycol (PEG 3350)    bisacodyl    acetaminophen    hydrocortisone

## 2025-04-18 NOTE — PLAN OF CARE
A&Ox4. VSS. KAREN, CPAP, . Denies chest pain and SOB.   Telemetry: NSR.   GI: Abdomen soft,non distended. Passing gas.   Denies nausea.   : Calvo in place (draining clear yellow urine)  Pain controlled with PRN Tramadol  Up ad melany  Diet: Regular   IVF running per order.   All appropriate safety measures in place. All questions and concerns addressed.

## 2025-04-18 NOTE — PROGRESS NOTES
[facility]  Follow up note    Bruce R Salus Patient Status:  Observation    10/31/1950 MRN [id number]   Location [facility] 3NW-A Attending Suleman Tejada MD   Hosp Day # 3 PCP Arlene Bruce MD     Interval history--  Ascites has re accumulated on exam yesterday.  I have reviewed with Dr. Jean-Baptiste.  Repeat paracentesis done removing 5 L ascites.  Reilly wishes to wait on abdominal pleurx for now.    Ascites fluid studies noted and plans for abx per GI noted.  Cytology was negative.    CT post paracentesis shows omental carcinomatosis.  D/W GI and Dr. Jean-Baptiste today        4/15/2025  Reason for Consultation:  Hx of cholangiocarcinoma    History of Present Illness:  Bruce R Salus is a a(n) 74 year old male that presented to ED yesterday with urinary retention.  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   Had 10fr x 12 cm plastic biliary stent placed 3/24/2025.     Admitted with urinary retention, noted to have ascites and BRPBR.  Primary oncologist is Dr. Cassidy.  On Xarelto for port associated VTE.    During admission--seen by Urology.  Had cystoscopy and placement of FC over wire yesterday.    Seen by GI.  Ultrasound showed moderate degress ascites.   Paracentesis planned today, Xarelto held for this.    Per Dr. 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  Transition to Imfinzi monotherapy 2023-2024  Progression noted on MRI  FOLFOX 7/15/2024- 2025   FOLFIRI  started 4/2    History:  Past Medical History[1]  Past Surgical History[2]  Family History[3]   reports that he quit smoking about 44 years ago. His smoking use included cigarettes. He started smoking about 52 years ago. He has a 1.6 pack-year smoking history. He has never used smokeless tobacco. He reports that he does not currently use alcohol after a past usage of about 0.8 standard drinks of alcohol per week. He reports that he does not currently use drugs after having used the following drugs: Cannabis.    Allergies:  Allergies[4]    Medications:  Current Hospital Medications[5]    Review of Systems:  A 10-point review of systems was done with pertinent positives and negatives per the HPI.    Physical Exam:   Blood pressure 114/57, pulse 76, temperature 98.4 °F (36.9 °C), temperature source Oral, resp. rate 18, height 5' 11\" (1.803 m), weight 200 lb 13.4 oz (91.1 kg), SpO2 94%.   General: Patient is alert and oriented x 3, not in acute distress.   HEENT: EOMs intact. PERRL. Oropharynx is clear.    Neck: No JVD. No palpable lymphadenopathy. Neck is supple.   Chest: Clear to auscultation.   Heart: Regular rate and rhythm.    Abdomen: Soft, non tender with good bowel sounds.   Extremities: Pedal pulses are present. No edema.   Neurological: Grossly intact.    Lymphatics: There is no palpable lymphadenopathy throughout in the cervical,            supraclavicular, axillary, or inguinal regions.       Laboratory Data:    Lab Results   Component Value Date    WBC 5.1 04/17/2025    HGB 9.8 04/17/2025    HCT 30.6 04/17/2025    .0 04/17/2025    CREATSERUM 0.97 04/17/2025    BUN 20 04/17/2025     04/17/2025    K 4.4 04/17/2025     04/17/2025    CO2 25.0 04/17/2025     04/17/2025    CA 9.2 04/17/2025    INR 2.94 04/17/2025    MG 1.7 04/17/2025     Lab Results   Component Value Date    WBC 5.1 04/17/2025    HGB 9.8 (L) 04/17/2025    HCT 30.6 (L) 04/17/2025    .0 04/17/2025    NEPERCENT 83.4  04/15/2025    LYPERCENT 6.5 04/15/2025    MOPERCENT 9.5 04/15/2025    EOPERCENT 0.1 04/15/2025    BAPERCENT 0.2 04/15/2025    NE 7.86 (H) 04/15/2025    LYMABS 0.61 (L) 04/15/2025    MOABSO 0.90 04/15/2025    EOABSO 0.01 04/15/2025    BAABSO 0.02 04/15/2025          Imaging:  US PARACENTESIS W IMAGING (CPT=49083)  Result Date: 4/17/2025  CONCLUSION:  Ultrasound-guided paracentesis was performed without complication.   LOCATION:  [facility]     Dictated by (CST): Kelvin Trinh DO on 4/17/2025 at 5:21 PM     Finalized by (CST): Kelvin Trinh DO on 4/17/2025 at 5:22 PM       CT ABDOMEN+PELVIS(CONTRAST ONLY)(CPT=74177)  Result Date: 4/16/2025  CONCLUSION:  Interval development of omental carcinomatosis with large degree of abdominal and pelvic ascites when compared to 7/11/2024 CT scan.  There are now small bilateral pleural effusions with bibasilar atelectasis.  There is a heterogeneous infiltrating process in the left lobe of the liver with biliary ductal prominence without significant change.  Common bile duct stent in place.  Pancreatic ductal stent is no longer appreciated.  Stable CT appearance of the kidneys with bilateral cysts and nonobstructing calculi.   LOCATION:  [facility]   Dictated by (CST): Cally Frye MD on 4/16/2025 at 4:48 PM     Finalized by (CST): Cally Frye MD on 4/16/2025 at 4:59 PM       US PARACENTESIS W IMAGING (CPT=49083)  Result Date: 4/15/2025  CONCLUSION:  Ultrasound-guided paracentesis was performed without complication.   LOCATION:  [facility]     Dictated by (CST): Smooth Purcell MD on 4/15/2025 at 2:34 PM     Finalized by (CST): Smooth Purcell MD on 4/15/2025 at 2:35 PM       US ABDOMEN LIMITED (CPT=76705)  Result Date: 4/14/2025  CONCLUSION:  Moderate degree of ascites.   LOCATION:  [facility]   Dictated by (CST): Cally Frye MD on 4/14/2025 at 3:48 PM     Finalized by (CST): Cally Frye MD on 4/14/2025 at 3:49 PM       XR ENDO BILE DUCT (CPT=74328)  Result Date: 2/24/2025  CONCLUSION:  See  above.   LOCATION:  [id number]   Dictated by (CST): Bro Feng MD on 2/24/2025 at 12:59 PM     Finalized by (CST): Bro Feng MD on 2/24/2025 at 1:00 PM          Impression and Plan:    Problem List[6]    Impression  Urinary retention--has been seen by Urology, FC remains in place.  Ascites--recurrent post paracentesis earlier this week--4L and again yesterday x 5 L.  Cytology negative.  Hx of metastatic cholangiocarcinoma  Anemia 9.9 grams.  Treatment and underlying disease related.  Normal WBC and platelet count.  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  Has been seen by both Urology and GI during admission  Repeat therapeutic paracentesis done yesterday.  We reviewed he may need an abdominal catheter placed for this soon.   He wishes to hold on this for now.      Plan  Hold chemotherapy while inpatient  He was due for chemotherapy later this week, this will be on hold for now  Cytology negative from earlier this week.    CT post initial 4 L paracentesis shows omental carcinomatosis, ascites, pleural effusions.  I have reviewed with Urology team earlier this wek  FC to remain in place for now.   Therapeutic paracentesis repeated yesterday.  5 L removed.  Extended discussion with patient and dtr today--. 45 mins.    We plan PT/OT  Abdominal pleurx when possible, probably Monday  Repeat cytology at time of pleurx placement/repeat paracentesis if possible.    Thank you for allowing me to participate in the care of your patient.    Jaime Ortiz MD           [1]   Past Medical History:   Anxiety state    BPH (benign prostatic hyperplasia)    Calculus of kidney    Cancer (HCC)    bile duct cancer    Chemotherapy-induced nausea    Cholangiocarcinoma (HCC)    Deep vein thrombosis (HCC)    port cath had clot    Depression    Esophageal reflux    High blood pressure    High cholesterol    History of recent hospitalization    10 days per pt  at [facility]- had high fever, found sepsis - treated w antibiotics/ no fever now    Hypercholesterolemia    HYPERTENSION    Hypertension    Kidney disease    LFT elevation    Metastasis to peritoneal cavity (HCC)    Moderate episode of recurrent major depressive disorder (HCC)    KAREN (obstructive sleep apnea)    mild, AHI 10, O2 jas 84%, AutoPAP 8-20    Other and unspecified hyperlipidemia    OTHER DISEASES    pneumonia after flu    Personal history of antineoplastic chemotherapy    last infusion.   JANUARY 22TH 2025    Pneumonia    Pneumonia due to organism    12 YEARS AGO    Reflux    Sleep apnea    cpap    Unspecified essential hypertension    Visual impairment    GLASSES   [2]   Past Surgical History:  Procedure Laterality Date    Cholecystectomy      Closed rx nose fx w stabilizatn  03/21/2013    Procedure: CLOSED REDUCTION NASAL FRACTURE;  Surgeon: Lianna Luna MD;  Location: [facility]    Colonoscopy N/A 08/24/2021    Procedure: COLONOSCOPY, with polypectomy;  Surgeon: Gaurav Cruz MD;  Location: [facility]    Colonoscopy & polypectomy  10/07/2015    a small polyp was removed; ASC    Colonoscopy,remv lesn,snare N/A 10/07/2015    Procedure: COLONOSCOPY, POSSIBLE BIOPSY, POSSIBLE POLYPECTOMY 27384;  Surgeon: Gaurav Cruz MD;  Location: [facility]    Cystoscopy,insert ureteral stent  04/08/2013    Procedure: CYSTOSCOPY/URETEROSCOPY/STONE EXTRACTION;  Surgeon: Eduin Luna MD;  Location: [facility]    Cystouretero w/lithotripsy  04/08/2013    Procedure: LITHOTRIPSY HOLMIUM LASER WITH CYSTOSCOPY;  Surgeon: Eduin Luna MD;  Location: [facility]    Cystourethroscopy      Cystouretro w/stone remove  04/08/2013    Procedure: CYSTO, WITH INSERTION OF STENT;  Surgeon: Eduin Luna MD;  Location: [facility]    Ercp,diagnostic  10/2024    Other surgical history      KIDNEY STONE REMOVED     Other surgical history      excision of left  renal cyst     Other surgical history  11/15/2012    Flow US, RBUS    Other surgical history  12/05/2013    flow us- DR. Wilson    Other surgical history  2016    Cystoscopy    Skin surgery  09/15/2020    [facility]MS-SCC-Left ear-Dr. RACHEL Hull     X-ray retrograde pyelogram  04/08/2013    Procedure: CYSTOSCOPY/URETEROSCOPY/STONE EXTRACTION;  Surgeon: Eduin Luna MD;  Location: [facility]   [3]   Family History  Problem Relation Age of Onset    Other (Other) Father         sepsis    Cancer Mother         lymphoma    Cancer Sister         thyroid CA   [4]   Allergies  Allergen Reactions    Chlorhexidine RASH     Clarissa body wipes   [5]   Current Facility-Administered Medications:     traMADol (Ultram) tab 50 mg, 50 mg, Oral, Q6H PRN    HYDROcodone-acetaminophen (Norco) 5-325 MG per tab 1 tablet, 1 tablet, Oral, Q6H PRN    docusate sodium (Colace) cap 100 mg, 100 mg, Oral, BID    polyethylene glycol (PEG 3350) (Miralax) 17 g oral packet 17 g, 17 g, Oral, Daily PRN    bisacodyl (Dulcolax) 10 MG rectal suppository 10 mg, 10 mg, Rectal, Daily PRN    rivaroxaban (Xarelto) tab 20 mg, 20 mg, Oral, Daily with food    lisinopril (Prinivil; Zestril) tab 20 mg, 20 mg, Oral, Daily    cefTRIAXone (Rocephin) 2 g in sodium chloride 0.9% 100 mL IVPB-ADDV, 2 g, Intravenous, Q24H    acetaminophen (Tylenol Extra Strength) tab 500-1,000 mg, 500-1,000 mg, Oral, Q4H PRN    pantoprazole (Protonix) DR tab 20 mg, 20 mg, Oral, QAM AC    tamsulosin (Flomax) cap 0.4 mg, 0.4 mg, Oral, BID    atorvastatin (Lipitor) tab 10 mg, 10 mg, Oral, Nightly    hydrocortisone (Anusol-HC) 2.5 % rectal cream, , Rectal, BID PRN    venlafaxine ER (Effexor-XR) 24 hr cap 37.5 mg, 37.5 mg, Oral, Nightly    sodium chloride 0.9% infusion, , Intravenous, Continuous  [6]   Patient Active Problem List  Diagnosis    Essential hypertension    KAREN on CPAP    History of kidney stones    Aorto-iliac atherosclerosis    BPH without obstruction/lower urinary tract  symptoms    Elevated prostate specific antigen (PSA)    Moderate episode of recurrent major depressive disorder (HCC)    BMI 28.0-28.9,adult    History of nonmelanoma skin cancer    Urinary retention

## 2025-04-18 NOTE — PROGRESS NOTES
A&Ox4. VSS. RA. .  Telemetry: NSR  GI: Abdomen soft, nondistended. Denies passing gas and belching.  Denies nausea, SOB, chest pain.  : Voids via intact wilkerson catheter  Medicated per MAR.   Up ad melany.   Incisions: dressing on RLQ C/D/I  Diet: tolerating regular diet   IVF running per order.    All appropriate safety measures in place. All questions and concerns addressed. Plan of care ongoing.

## 2025-04-19 LAB — MAGNESIUM SERPL-MCNC: 1.6 MG/DL (ref 1.6–2.6)

## 2025-04-19 RX ORDER — MAGNESIUM OXIDE 400 MG/1
400 TABLET ORAL ONCE
Status: COMPLETED | OUTPATIENT
Start: 2025-04-19 | End: 2025-04-19

## 2025-04-19 RX ORDER — LACTULOSE 10 G/15ML
30 SOLUTION ORAL
Status: COMPLETED | OUTPATIENT
Start: 2025-04-19 | End: 2025-04-19

## 2025-04-19 RX ORDER — SODIUM PHOSPHATE, DIBASIC AND SODIUM PHOSPHATE, MONOBASIC 7; 19 G/230ML; G/230ML
1 ENEMA RECTAL ONCE AS NEEDED
Status: COMPLETED | OUTPATIENT
Start: 2025-04-19 | End: 2025-04-19

## 2025-04-19 NOTE — PROGRESS NOTES
Alert & oriented x4. VSS on room air. Calvo in place. Pt c/o constipation, Dr. Saenz notified, lactulose x3 ordered. Tolerating diet. Ambulates with assist. Denies nausea/chest pain/SOB. Pain controlled. Patient updated on plan of care. Questions and concerns addressed. Safety precautions in place. Frequent rounds performed.

## 2025-04-19 NOTE — SPIRITUAL CARE NOTE
Spiritual Care Visit Note    Patient Name: Bruce R Salus Date of Spiritual Care Visit: 25   : 10/31/1950 Primary Dx: Urinary retention   Referred By:  Consult request    Spiritual Care Taxonomy:    Intended Effects: Tamiko affirmation    Methods: Encouraging spiritual/Samaritan practices    Interventions: Discuss spirituality/Uatsdin with someone, Provide hospitality, Provde spiritual/Samaritan resources    Visit Type/Summary:  Reilly was sitting up in bed, enjoying his meal and visiting with Macie, his wife.   delivered reading for [place], 2025 and battery lit ceremonial  candles.   and Mrs Salus seemed please to receive them and Reilly sang the first part of \"Let My people free\" for the .    - Spiritual Care:  remains available as needed for follow up.    Spiritual Care support can be requested via an Epic consult. For urgent/immediate needs, please contact the On Call  at: [facility]: ext [phone]    Sayda Gustafson MDiv.  Staff

## 2025-04-19 NOTE — PLAN OF CARE
A&Ox4. VSS. KAREN, CPAP, . Denies chest pain and SOB.   Telemetry: NSR.   GI: Abdomen soft,non distended. Passing gas.   Colace and Miralax given for costipation  Denies nausea.   : Calvo in place (draining clear yellow urine)  Pain controlled with PRN Tramadol  Up ad melany  Diet: Regular   Saline locked  All appropriate safety measures in place. All questions and concerns addressed.

## 2025-04-19 NOTE — CM/SW NOTE
ROBERT received message from RN stating pt's family is requesting to begin the discharge planning process and requested a commode/shower chair ordered for pt. ROBERT informed RN a shower chair is not covered by insurance and the family will need to purchase/rent the shower chair.    Noted PT worked with pt and Anticipated therapy need: Home with Home Healthcare    HH referral sent in [facility]IDIN, choice list will be provided once available.     ROBERT placed order for commode. MD will need to cosign DME order and place below verbiage in a progress note.  Referral sent to [facility].     Three in one commode:   A three in one commode has been ordered to assist Reilly NASSAR Salus in activities of daily living. The patient they are confined to one level of their home and there is no toilet on that level.     HERO Loabto  Discharge Planner

## 2025-04-19 NOTE — PROGRESS NOTES
[facility] Hospitalist Progress Note     PCP: Arlene Bruce MD    Chief Complaint: follow-up   Follow up for: The primary encounter diagnosis was Urinary retention. Diagnoses of Urine retention, Anemia, unspecified type, Bright red blood per rectum, and Cholangiocarcinoma (HCC) were also pertinent to this visit.    Overnight/Interim Events:      SUBJECTIVE:  Patient seen and examined.  Abd pain improved.  Had some night sweats and low grade temp last night.  Passing gas but no BM since earlier in the week.   Denies CP/SOB.  NAD.       OBJECTIVE:  Temp:  [97.7 °F (36.5 °C)-100.2 °F (37.9 °C)] 97.7 °F (36.5 °C)  Pulse:  [66-82] 67  Resp:  [18-20] 18  BP: (109-137)/(52-63) 113/63  SpO2:  [90 %-99 %] 99 %    Intake/Output:    Intake/Output Summary (Last 24 hours) at 4/19/2025 1107  Last data filed at 4/19/2025 0832  Gross per 24 hour   Intake 2304 ml   Output 800 ml   Net 1504 ml       Last 3 Weights   04/17/25 0751 200 lb 13.4 oz (91.1 kg)   04/16/25 0622 200 lb 14.4 oz (91.1 kg)   04/14/25 0636 200 lb (90.7 kg)   04/14/25 0625 200 lb (90.7 kg)   03/24/25 0803 202 lb (91.6 kg)   03/11/25 1712 202 lb (91.6 kg)   02/24/25 1041 205 lb (93 kg)   02/12/25 1050 205 lb (93 kg)       Exam    General: Alert, no distress, appears stated age.     Head:  Normocephalic, without obvious abnormality, atraumatic.   Eyes:  Sclera anicteric, EOMs intact.    Nose: Nares normal,  Mucosa normal    Throat: Lips normal   Neck: Supple, symmetrical, trachea midline   Lungs:   Clear to auscultation bilaterally. Normal effort   Chest wall:  No tenderness or deformity   Heart:  Regular rate and rhythm, S1, S2 normal, no murmur, rub or gallop appreciated   Abdomen:   Softly distended, +tympanic to percussion, NT/ND, Bowel sounds normal. No masses,  No organomegaly.      Extremities: Extremities normal, atraumatic, no cyanosis or LE edema.   Skin: Skin color, texture, turgor normal. No rashes or lesions.    Neurologic: Moving all extremities  spontaneously, no focal deficit appreciated      Data Review:       Labs:     Recent Labs   Lab 04/14/25  0739 04/15/25  0557 04/16/25  1227 04/17/25  1516 04/17/25  1517   WBC 7.4 9.4 4.8  --  5.1   HGB 10.7* 9.5* 9.9*  --  9.8*   MCV 86.7 85.8 85.9  --  86.2   .0 290.0 280.0  --  336.0   INR 2.79* 1.92* 2.84* 2.94*  --        Recent Labs   Lab 04/14/25  0739 04/15/25  0557 04/16/25  1227 04/17/25  1516 04/19/25  0606    139 141 141  --    K 4.4 4.8 4.5 4.4  --     107 109 109  --    CO2 25.0 24.0 26.0 25.0  --    BUN 23 24* 20 20  --    CREATSERUM 1.17 1.21 0.99 0.97  --    CA 9.9 9.4 9.2 9.2  --    MG  --  1.9 1.8 1.7 1.6   * 149* 95 112*  --        Recent Labs   Lab 04/14/25  0739   ALT 16   AST 20   ALB 3.7       No results for input(s): \"PGLU\" in the last 168 hours.    No results for input(s): \"TROP\" in the last 168 hours.      Meds:     Scheduled Medications[1]  Medication Infusions[2]  PRN Medications[3]       Assessment/Plan:     74 year old male with PMH including but not limited to HTN, HL, Cholangiocarcinoma, KAREN, GERD who p/t [facility] ED c urinary retention.         # urinary retention  - NPO, d/w RAFAELA Carreon and Dr. Luna  -cystoscopy, (complicated) placement of wilkerson catheter (over wire) 4/14  - Ucx NG <18hrs  - follow PVR  - flomax  -Continue with wilkerson catheter to gravity drainage.    - F/U with urology in 1 month for catheter removal or exchange.        # Oliguria  - IVF, follow u/o and cr   - will dc IVF given recurrent ascites and stable Cr      Cholangiocarcinoma with peritoneal mets and recent recurrent cholangitis secondary to common hepatic duct stricture extending to right main hepatic duct with placement of 10 fr x 12 cm plastic biliary stent on 3/24/25  Ascites   brbpr.   - GI following, apprec  - ultrasound abd c mod degress ascites  - CMP mostly ok  - s/p para 4/15; f/u studies; cytology pending   - WBCs 1083, 54% N, empiric CTX started  plan for 5d course followed by  cipro per GI  - resumed xarelto   - proctozone cream BID PRN for bleeding   - onc c/s   - sent blood cxs for completeness; one set c GPC in clusters, assume contaminant, re-sent blood cxs, NG x1d   - d/w MD Angel and MD Milvia, ascities has re-accumulated, repeat para; will need to set up o/p frances v pleurex - possible pleurex on Monday   - CT a/p to assess progression reviewed c pt, omental carcinomatosis.    - PCT 0.25  - will work on getting BM today, trial lactulose if not plan for enema, constipation could be significant source of his abdominal pain/discomfort      Anemia  - hgb 10.7 to 9.5 to 9.9, likely 2/2 malignancy      # HL  -statin     # GERD  -PPI, wean as o/p if appropriate     # Major Depression/ Generalized anxiety d/o   -reviewed home meds, continue SSRI currently appears stable         # HTN  - resume ace in am    # Proph  - scds     Dispo: med onc, had refused labs earlier       Jose Saenz MD  [facility]  Message over [email or web address]  Pager: [phone]         [1]    lactulose  30 g Oral Q2H    docusate sodium  100 mg Oral BID    rivaroxaban  20 mg Oral Daily with food    lisinopril  20 mg Oral Daily    cefTRIAXone  2 g Intravenous Q24H    pantoprazole  20 mg Oral QAM AC    tamsulosin  0.4 mg Oral BID    atorvastatin  10 mg Oral Nightly    venlafaxine ER  37.5 mg Oral Nightly   [2] [3]   morphINE **OR** morphINE **OR** morphINE    tiZANidine    traMADol    HYDROcodone-acetaminophen    polyethylene glycol (PEG 3350)    bisacodyl    acetaminophen    hydrocortisone

## 2025-04-19 NOTE — PHYSICAL THERAPY NOTE
PHYSICAL THERAPY EVALUATION - INPATIENT     Room Number: 321/321-A  Evaluation Date: 4/19/2025  Type of Evaluation: Initial  Physician Order: PT Eval and Treat    Presenting Problem: urinary retention  Co-Morbidities : HTN, HL, cholangiocarcinoma, KAREN, GERD  Reason for Therapy: Mobility Dysfunction and Discharge Planning    PHYSICAL THERAPY ASSESSMENT   Patient is a 74 year old male admitted 4/14/2025 for urinary retention.  Prior to admission, patient's baseline is IND.  Patient is currently functioning near baseline with bed mobility, transfers, gait, and maintaining seated position.  Patient is requiring supervision and stand-by assist as a result of the following impairments: decreased functional strength, decreased endurance/aerobic capacity, pain, and decreased muscular endurance.  Physical Therapy will continue to follow for duration of hospitalization.    Patient will benefit from continued skilled PT Services at discharge to promote prior level of function and safety with additional support and return home with home health PT.    PLAN DURING HOSPITALIZATION  Nursing Mobility Recommendation : 1 Assist     PT Treatment Plan: Bed mobility, Body mechanics, Endurance, Energy conservation, Patient education, Neuromuscular re-educate, Gait training, Range of motion, Stoop training, Strengthening, Stair training, Transfer training, Balance training  Rehab Potential : Good  Frequency (Obs):  (1-2x/week)     CURRENT GOALS    Goal #1 Patient is able to demonstrate supine - sit EOB @ level: modified independent     Goal #2 Patient is able to demonstrate transfers Sit to/from Stand at assistance level: modified independent     Goal #3 Patient is able to ambulate 150 feet with assist device:  LRD  at assistance level: modified independent     Goal #4    Goal #5    Goal #6    Goal Comments: Goals established on 4/19/2025      PHYSICAL THERAPY MEDICAL/SOCIAL HISTORY  History related to current admission: Patient is a 74  year old male admitted on 4/14/2025 from home for urinary retention.    HOME SITUATION  Type of Home: House  Home Layout: Multi-level  Stairs to Enter : 4        Stairs to Bedroom: 10         Lives With: Spouse    Drives: Yes   Patient Regularly Uses: Glasses      Prior Level of [place]: pt reports being mod IND prior to admission. Lives with spouse in multi level house. No assistive devices utilized.     SUBJECTIVE  \"I told her 3 walks\"    OBJECTIVE  Precautions: Bed/chair alarm  Fall Risk: Standard fall risk    WEIGHT BEARING RESTRICTION     PAIN ASSESSMENT  Rating:  (did not rate)  Location: lower abdomen  Management Techniques: Activity promotion, Breathing techniques, Body mechanics, Relaxation, Repositioning    COGNITION  Overall Cognitive Status:  WFL - within functional limits    RANGE OF MOTION AND STRENGTH ASSESSMENT  Upper extremity ROM and strength are within functional limits     Lower extremity ROM is within functional limits     Lower extremity strength is within functional limits     BALANCE  Static Sitting: Fair  Dynamic Sitting: Fair  Static Standing: Fair  Dynamic Standing: Fair    ADDITIONAL TESTS                                    ACTIVITY TOLERANCE                         O2 WALK       NEUROLOGICAL FINDINGS  Neurological Findings: None                     AM-PAC '6-Clicks' INPATIENT SHORT FORM - BASIC MOBILITY  How much difficulty does the patient currently have...  Patient Difficulty: Turning over in bed (including adjusting bedclothes, sheets and blankets)?: A Little   Patient Difficulty: Sitting down on and standing up from a chair with arms (e.g., wheelchair, bedside commode, etc.): A Little   Patient Difficulty: Moving from lying on back to sitting on the side of the bed?: A Little   How much help from another person does the patient currently need...   Help from Another: Moving to and from a bed to a chair (including a wheelchair)?: A Little   Help from Another: Need to walk in  hospital room?: A Little   Help from Another: Climbing 3-5 steps with a railing?: A Lot     AM-PAC Score:  Raw Score: 17   Approx Degree of Impairment: 50.57%   Standardized Score (AM-PAC Scale): 42.13   CMS Modifier (G-Code): CK    FUNCTIONAL ABILITY STATUS  Gait Assessment   Functional Mobility/Gait Assessment  Gait Assistance: Supervision (bout of SBA)  Distance (ft): 60  Assistive Device: None  Pattern: Within Functional Limits    Skilled Therapy Provided     Bed Mobility:  Supine to sit: supervision   Sit to supine: supervision     Transfer Mobility:  Sit to stand: supervision   Stand to sit: supervision  Gait = supervision, bout of SBA    Therapist's Comments: pt educated on role of IP PT services, PT evaluation purpose, PT POC, PT goals, device recommendations, dc recommendations, and importance of mobility while hospitalized.   -VC throughout session for safety and sequencing. Incr time to complete mobility tasks throughout. While ambulating, pt with small bout of SBA for misstep that pt was able to self correct without external assist from therapist. Pt initiating few, however short, standing rest breaks.     Exercise/Education Provided:  Bed mobility  Body mechanics  Energy conservation  Functional activity tolerated  Gait training  ROM  Strengthening  Transfer training    Patient End of Session: In bed, Needs met, Call light within reach, RN aware of session/findings, All patient questions and concerns addressed, Hospital anti-slip socks, Alarm set      Patient Evaluation Complexity Level:  History Moderate - 1 or 2 personal factors and/or co-morbidities   Examination of body systems Moderate - addressing a total of 3 or more elements   Clinical Presentation Low- Stable   Clinical Decision Making Low Complexity       PT Session Time: 23 minutes  Therapeutic Activity: 15 minutes

## 2025-04-20 ENCOUNTER — APPOINTMENT (OUTPATIENT)
Dept: GENERAL RADIOLOGY | Facility: HOSPITAL | Age: 75
DRG: 374 | End: 2025-04-20
Attending: HOSPITALIST
Payer: MEDICARE

## 2025-04-20 LAB
ANION GAP SERPL CALC-SCNC: 8 MMOL/L (ref 0–18)
BACTERIA BLD CULT: POSITIVE
BASOPHILS # BLD AUTO: 0.03 X10(3) UL (ref 0–0.2)
BASOPHILS NFR BLD AUTO: 0.4 %
BUN BLD-MCNC: 18 MG/DL (ref 9–23)
CALCIUM BLD-MCNC: 9.6 MG/DL (ref 8.7–10.6)
CHLORIDE SERPL-SCNC: 107 MMOL/L (ref 98–112)
CO2 SERPL-SCNC: 24 MMOL/L (ref 21–32)
CREAT BLD-MCNC: 1.18 MG/DL (ref 0.7–1.3)
EGFRCR SERPLBLD CKD-EPI 2021: 64 ML/MIN/1.73M2 (ref 60–?)
EOSINOPHIL # BLD AUTO: 0.02 X10(3) UL (ref 0–0.7)
EOSINOPHIL NFR BLD AUTO: 0.3 %
ERYTHROCYTE [DISTWIDTH] IN BLOOD BY AUTOMATED COUNT: 15.9 %
GLUCOSE BLD-MCNC: 138 MG/DL (ref 70–99)
HCT VFR BLD AUTO: 34 % (ref 39–53)
HGB BLD-MCNC: 11 G/DL (ref 13–17.5)
IMM GRANULOCYTES # BLD AUTO: 0.06 X10(3) UL (ref 0–1)
IMM GRANULOCYTES NFR BLD: 0.9 %
LYMPHOCYTES # BLD AUTO: 0.78 X10(3) UL (ref 1–4)
LYMPHOCYTES NFR BLD AUTO: 11.6 %
MAGNESIUM SERPL-MCNC: 1.9 MG/DL (ref 1.6–2.6)
MCH RBC QN AUTO: 27.2 PG (ref 26–34)
MCHC RBC AUTO-ENTMCNC: 32.4 G/DL (ref 31–37)
MCV RBC AUTO: 84 FL (ref 80–100)
MONOCYTES # BLD AUTO: 0.98 X10(3) UL (ref 0.1–1)
MONOCYTES NFR BLD AUTO: 14.6 %
NEUTROPHILS # BLD AUTO: 4.85 X10 (3) UL (ref 1.5–7.7)
NEUTROPHILS # BLD AUTO: 4.85 X10(3) UL (ref 1.5–7.7)
NEUTROPHILS NFR BLD AUTO: 72.2 %
OSMOLALITY SERPL CALC.SUM OF ELEC: 292 MOSM/KG (ref 275–295)
PLATELET # BLD AUTO: 449 10(3)UL (ref 150–450)
POTASSIUM SERPL-SCNC: 4.5 MMOL/L (ref 3.5–5.1)
RBC # BLD AUTO: 4.05 X10(6)UL (ref 3.8–5.8)
SODIUM SERPL-SCNC: 139 MMOL/L (ref 136–145)
WBC # BLD AUTO: 6.7 X10(3) UL (ref 4–11)

## 2025-04-20 PROCEDURE — 74018 RADEX ABDOMEN 1 VIEW: CPT | Performed by: HOSPITALIST

## 2025-04-20 RX ORDER — ONDANSETRON 2 MG/ML
4 INJECTION INTRAMUSCULAR; INTRAVENOUS EVERY 6 HOURS PRN
Status: DISCONTINUED | OUTPATIENT
Start: 2025-04-20 | End: 2025-04-28

## 2025-04-20 RX ORDER — KETOROLAC TROMETHAMINE 15 MG/ML
15 INJECTION, SOLUTION INTRAMUSCULAR; INTRAVENOUS ONCE
Status: COMPLETED | OUTPATIENT
Start: 2025-04-20 | End: 2025-04-20

## 2025-04-20 RX ORDER — SIMETHICONE 80 MG
80 TABLET,CHEWABLE ORAL 4 TIMES DAILY PRN
Status: DISCONTINUED | OUTPATIENT
Start: 2025-04-20 | End: 2025-04-28

## 2025-04-20 NOTE — PROGRESS NOTES
[facility]  Follow up note    Bruce R Salus Patient Status:  Observation    10/31/1950 MRN [id number]   Location [facility] 3NW-A Attending Suleman Tejada MD   Hosp Day # 5 PCP Arlene Bruce MD     Interval history--  Patient notes that his abdomen was feeling worse today and was having more pain but then after passing flatus, KUB was ordered      Reason for Consultation:  Hx of cholangiocarcinoma    History of Present Illness:  Bruce R Salus is a a(n) 74 year old male that presented to [facility] yesterday with urinary retention.  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   Had 10fr x 12 cm plastic biliary stent placed 3/24/2025.     Admitted with urinary retention, noted to have ascites and BRPBR.  Primary oncologist is Dr. Cassidy.  On Xarelto for port associated VTE.    During admission--seen by Urology.  Had cystoscopy and placement of FC over wire yesterday.    Seen by GI.  Ultrasound showed moderate degress ascites.   Paracentesis planned today, Xarelto held for this.    Per Dr. 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  Transition to Imfinzi monotherapy 2023-2024  Progression noted on MRI  FOLFOX 7/15/2024- 2025   FOLFIRI started     History:  Past Medical History[1]  Past Surgical History[2]  Family History[3]   reports that he quit smoking about 44 years ago. His smoking use included cigarettes. He started smoking about 52 years ago. He has a 1.6  pack-year smoking history. He has never used smokeless tobacco. He reports that he does not currently use alcohol after a past usage of about 0.8 standard drinks of alcohol per week. He reports that he does not currently use drugs after having used the following drugs: Cannabis.    Allergies:  Allergies[4]    Medications:  Current Hospital Medications[5]    Review of Systems:  A 10-point review of systems was done with pertinent positives and negatives per the HPI.    Physical Exam:   Blood pressure 137/66, pulse 63, temperature 97.8 °F (36.6 °C), temperature source Oral, resp. rate 18, height 5' 11\" (1.803 m), weight 200 lb 13.4 oz (91.1 kg), SpO2 100%.   General: Patient is alert and oriented x 3, not in acute distress.   HEENT: EOMs intact. PERRL. Oropharynx is clear.    Neck: No JVD. No palpable lymphadenopathy. Neck is supple.   Chest: Clear to auscultation.   Heart: Regular rate and rhythm.    Abdomen: Soft, non tender with good bowel sounds.   Extremities: Pedal pulses are present. No edema.   Neurological: Grossly intact.    Lymphatics: There is no palpable lymphadenopathy throughout in the cervical,            supraclavicular, axillary, or inguinal regions.       Laboratory Data:    Lab Results   Component Value Date    WBC 6.7 04/20/2025    HGB 11.0 04/20/2025    HCT 34.0 04/20/2025    .0 04/20/2025    CREATSERUM 1.18 04/20/2025    BUN 18 04/20/2025     04/20/2025    K 4.5 04/20/2025     04/20/2025    CO2 24.0 04/20/2025     04/20/2025    CA 9.6 04/20/2025    MG 1.9 04/20/2025     Lab Results   Component Value Date    WBC 6.7 04/20/2025    HGB 11.0 (L) 04/20/2025    HCT 34.0 (L) 04/20/2025    .0 04/20/2025    NEPERCENT 72.2 04/20/2025    LYPERCENT 11.6 04/20/2025    MOPERCENT 14.6 04/20/2025    EOPERCENT 0.3 04/20/2025    BAPERCENT 0.4 04/20/2025    NE 4.85 04/20/2025    LYMABS 0.78 (L) 04/20/2025    MOABSO 0.98 04/20/2025    EOABSO 0.02 04/20/2025    BAABSO 0.03 04/20/2025           Imaging:  US PARACENTESIS W IMAGING (CPT=49083)  Result Date: 4/17/2025  CONCLUSION:  Ultrasound-guided paracentesis was performed without complication.   LOCATION:  [facility]     Dictated by (CST): ZiggyKelvin islas DO on 4/17/2025 at 5:21 PM     Finalized by (CST): Ziggy RusskatarinaalysaDO on 4/17/2025 at 5:22 PM       CT ABDOMEN+PELVIS(CONTRAST ONLY)(CPT=74177)  Result Date: 4/16/2025  CONCLUSION:  Interval development of omental carcinomatosis with large degree of abdominal and pelvic ascites when compared to 7/11/2024 CT scan.  There are now small bilateral pleural effusions with bibasilar atelectasis.  There is a heterogeneous infiltrating process in the left lobe of the liver with biliary ductal prominence without significant change.  Common bile duct stent in place.  Pancreatic ductal stent is no longer appreciated.  Stable CT appearance of the kidneys with bilateral cysts and nonobstructing calculi.   LOCATION:  [facility]   Dictated by (CST): Cally Frye MD on 4/16/2025 at 4:48 PM     Finalized by (CST): Cally Frye MD on 4/16/2025 at 4:59 PM       US PARACENTESIS W IMAGING (CPT=49083)  Result Date: 4/15/2025  CONCLUSION:  Ultrasound-guided paracentesis was performed without complication.   LOCATION:  [facility]     Dictated by (CST): Smooth Purcell MD on 4/15/2025 at 2:34 PM     Finalized by (CST): Smooth Purcell MD on 4/15/2025 at 2:35 PM       US ABDOMEN LIMITED (CPT=76705)  Result Date: 4/14/2025  CONCLUSION:  Moderate degree of ascites.   LOCATION:  [facility]   Dictated by (CST): Cally Frye MD on 4/14/2025 at 3:48 PM     Finalized by (CST): Cally Frye MD on 4/14/2025 at 3:49 PM       XR ENDO BILE DUCT (CPT=74328)  Result Date: 2/24/2025  CONCLUSION:  See above.   LOCATION:  [id number]   Dictated by (CST): Bro Feng MD on 2/24/2025 at 12:59 PM     Finalized by (CST): Bro Feng MD on 2/24/2025 at 1:00 PM          Impression and Plan:    Problem List[6]    Impression  Urinary retention--has been seen by  Urology, FC remains in place.  Ascites--recurrent post paracentesis earlier this week--4L and again yesterday x 5 L.  Cytology negative.  Hx of metastatic cholangiocarcinoma  Anemia 9.9 grams.  Treatment and underlying disease related.  Normal WBC and platelet count.  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  Has been seen by both Urology and GI during admission      Plan  - hold further chemotherapy  - likely Pleur-Ex on Monday with drainage and repeat labwork to see if cytology is present or if SBP has resolved  -  KUB ordered to evaluate pain further  - IV antibiotics ongoing for now      Davis Sweeney MD           [1]   Past Medical History:   Anxiety state    BPH (benign prostatic hyperplasia)    Calculus of kidney    Cancer (HCC)    bile duct cancer    Chemotherapy-induced nausea    Cholangiocarcinoma (HCC)    Deep vein thrombosis (HCC)    port cath had clot    Depression    Esophageal reflux    High blood pressure    High cholesterol    History of recent hospitalization    10 days per pt at [facility]- had high fever, found sepsis - treated w antibiotics/ no fever now    Hypercholesterolemia    HYPERTENSION    Hypertension    Kidney disease    LFT elevation    Metastasis to peritoneal cavity (HCC)    Moderate episode of recurrent major depressive disorder (HCC)    KAREN (obstructive sleep apnea)    mild, AHI 10, O2 jas 84%, AutoPAP 8-20    Other and unspecified hyperlipidemia    OTHER DISEASES    pneumonia after flu    Personal history of antineoplastic chemotherapy    last infusion.   JANUARY 22TH 2025    Pneumonia    Pneumonia due to organism    12 YEARS AGO    Reflux    Sleep apnea    cpap    Unspecified essential hypertension    Visual impairment    GLASSES   [2]   Past Surgical History:  Procedure Laterality Date    Cholecystectomy      Closed rx nose fx w stabilizatn  03/21/2013    Procedure: CLOSED REDUCTION NASAL FRACTURE;   Surgeon: Lianna Luna MD;  Location: [facility]    Colonoscopy N/A 08/24/2021    Procedure: COLONOSCOPY, with polypectomy;  Surgeon: Gaurav Cruz MD;  Location: [facility]    Colonoscopy & polypectomy  10/07/2015    a small polyp was removed; ASC    Colonoscopy,remv lesn,snare N/A 10/07/2015    Procedure: COLONOSCOPY, POSSIBLE BIOPSY, POSSIBLE POLYPECTOMY 38151;  Surgeon: Gaurav Cruz MD;  Location: [facility]    Cystoscopy,insert ureteral stent  04/08/2013    Procedure: CYSTOSCOPY/URETEROSCOPY/STONE EXTRACTION;  Surgeon: Eduin Luna MD;  Location: [facility]    Cystouretero w/lithotripsy  04/08/2013    Procedure: LITHOTRIPSY HOLMIUM LASER WITH CYSTOSCOPY;  Surgeon: Eduin Luna MD;  Location: [facility]    Cystourethroscopy      Cystouretro w/stone remove  04/08/2013    Procedure: CYSTO, WITH INSERTION OF STENT;  Surgeon: Eduin Luna MD;  Location: [facility]    Ercp,diagnostic  10/2024    Other surgical history      KIDNEY STONE REMOVED     Other surgical history      excision of left renal cyst     Other surgical history  11/15/2012    Flow US, RBUS    Other surgical history  12/05/2013    flow us- DR. Wilson    Other surgical history  2016    Cystoscopy    Skin surgery  09/15/2020    [facility]-Left ear-Dr. RACHEL Hull     X-ray retrograde pyelogram  04/08/2013    Procedure: CYSTOSCOPY/URETEROSCOPY/STONE EXTRACTION;  Surgeon: Eduin Luna MD;  Location: [facility]   [3]   Family History  Problem Relation Age of Onset    Other (Other) Father         sepsis    Cancer Mother         lymphoma    Cancer Sister         thyroid CA   [4]   Allergies  Allergen Reactions    Chlorhexidine RASH     Clarissa body wipes   [5]   Current Facility-Administered Medications:     ondansetron (Zofran) 4 MG/2ML injection 4 mg, 4 mg, Intravenous, Q6H PRN    morphINE PF 2 MG/ML injection 1 mg, 1 mg, Intravenous, Q2H PRN **OR** morphINE  PF 2 MG/ML injection 2 mg, 2 mg, Intravenous, Q2H PRN **OR** morphINE PF 4 MG/ML injection 4 mg, 4 mg, Intravenous, Q2H PRN    tiZANidine (Zanaflex) partial tab 1 mg, 1 mg, Oral, Q6H PRN    traMADol (Ultram) tab 50 mg, 50 mg, Oral, Q6H PRN    HYDROcodone-acetaminophen (Norco) 5-325 MG per tab 1 tablet, 1 tablet, Oral, Q6H PRN    docusate sodium (Colace) cap 100 mg, 100 mg, Oral, BID    polyethylene glycol (PEG 3350) (Miralax) 17 g oral packet 17 g, 17 g, Oral, Daily PRN    bisacodyl (Dulcolax) 10 MG rectal suppository 10 mg, 10 mg, Rectal, Daily PRN    rivaroxaban (Xarelto) tab 20 mg, 20 mg, Oral, Daily with food    lisinopril (Prinivil; Zestril) tab 20 mg, 20 mg, Oral, Daily    cefTRIAXone (Rocephin) 2 g in sodium chloride 0.9% 100 mL IVPB-ADDV, 2 g, Intravenous, Q24H    acetaminophen (Tylenol Extra Strength) tab 500-1,000 mg, 500-1,000 mg, Oral, Q4H PRN    pantoprazole (Protonix) DR tab 20 mg, 20 mg, Oral, QAM AC    tamsulosin (Flomax) cap 0.4 mg, 0.4 mg, Oral, BID    atorvastatin (Lipitor) tab 10 mg, 10 mg, Oral, Nightly    hydrocortisone (Anusol-HC) 2.5 % rectal cream, , Rectal, BID PRN    venlafaxine ER (Effexor-XR) 24 hr cap 37.5 mg, 37.5 mg, Oral, Nightly  [6]   Patient Active Problem List  Diagnosis    Essential hypertension    KAREN on CPAP    History of kidney stones    Aorto-iliac atherosclerosis    BPH without obstruction/lower urinary tract symptoms    Elevated prostate specific antigen (PSA)    Moderate episode of recurrent major depressive disorder (HCC)    BMI 28.0-28.9,adult    History of nonmelanoma skin cancer    Urinary retention

## 2025-04-20 NOTE — PLAN OF CARE
NEURO: A&Ox4. VSS. KAREN, CPAP, . Denies chest pain and SOB.   Telemetry: NSR.   GI: Abdomen soft,non distended. Passing gas. Denies nausea  Fleet enema given for constipation with effectiveness  : Calvo in place (draining clear yellow urine)  PAIN: controlled with PRN pain medications  AMB: Up 1 assist  DIET: Regular diet  FLUIDS: IVFs / abx running per order    All appropriate safety measures in place. Pt updated on POC, call light within reach.    0200 - Emesis x 1, PRN zofran given with effectiveness.

## 2025-04-20 NOTE — PROGRESS NOTES
[facility] Hospitalist Progress Note     PCP: Arlene Bruce MD    Chief Complaint: follow-up   Follow up for: The primary encounter diagnosis was Urinary retention. Diagnoses of Urine retention, Anemia, unspecified type, Bright red blood per rectum, and Cholangiocarcinoma (HCC) were also pertinent to this visit.    Overnight/Interim Events:      SUBJECTIVE:  Patient seen and examined.  Passed large BM yesterday after lactulose and enema.   Later had severe pain all night that improved with flatulence.   Abd distended today.   Denies CP/SOB.  NAD.       OBJECTIVE:  Temp:  [97.8 °F (36.6 °C)-98.8 °F (37.1 °C)] 97.8 °F (36.6 °C)  Pulse:  [63-87] 63  Resp:  [18-19] 18  BP: (106-142)/(52-76) 137/66  SpO2:  [91 %-100 %] 100 %    Intake/Output:    Intake/Output Summary (Last 24 hours) at 4/20/2025 0833  Last data filed at 4/20/2025 0638  Gross per 24 hour   Intake 1159 ml   Output 925 ml   Net 234 ml       Last 3 Weights   04/17/25 0751 200 lb 13.4 oz (91.1 kg)   04/16/25 0622 200 lb 14.4 oz (91.1 kg)   04/14/25 0636 200 lb (90.7 kg)   04/14/25 0625 200 lb (90.7 kg)   03/24/25 0803 202 lb (91.6 kg)   03/11/25 1712 202 lb (91.6 kg)   02/24/25 1041 205 lb (93 kg)   02/12/25 1050 205 lb (93 kg)       Exam    General: Alert, no distress, appears stated age.     Head:  Normocephalic, without obvious abnormality, atraumatic.   Eyes:  Sclera anicteric, EOMs intact.    Nose: Nares normal,  Mucosa normal    Throat: Lips normal   Neck: Supple, symmetrical, trachea midline   Lungs:   Clear to auscultation bilaterally. Normal effort   Chest wall:  No tenderness or deformity   Heart:  Regular rate and rhythm, S1, S2 normal, no murmur, rub or gallop appreciated   Abdomen:   Softly distended, +tympanic to percussion, NT/ND, Bowel sounds normal. No masses,  No organomegaly.      Extremities: Extremities normal, atraumatic, no cyanosis or LE edema.   Skin: Skin color, texture, turgor normal. No rashes or lesions.    Neurologic: Moving  all extremities spontaneously, no focal deficit appreciated      Data Review:       Labs:     Recent Labs   Lab 04/14/25  0739 04/15/25  0557 04/16/25  1227 04/17/25 1516 04/17/25 1517 04/20/25  0513   WBC 7.4 9.4 4.8  --  5.1 6.7   HGB 10.7* 9.5* 9.9*  --  9.8* 11.0*   MCV 86.7 85.8 85.9  --  86.2 84.0   .0 290.0 280.0  --  336.0 449.0   INR 2.79* 1.92* 2.84* 2.94*  --   --        Recent Labs   Lab 04/14/25  0739 04/15/25  0557 04/16/25 1227 04/17/25 1516 04/19/25  0606 04/20/25  0513    139 141 141  --  139   K 4.4 4.8 4.5 4.4  --  4.5    107 109 109  --  107   CO2 25.0 24.0 26.0 25.0  --  24.0   BUN 23 24* 20 20  --  18   CREATSERUM 1.17 1.21 0.99 0.97  --  1.18   CA 9.9 9.4 9.2 9.2  --  9.6   MG  --  1.9 1.8 1.7 1.6 1.9   * 149* 95 112*  --  138*       Recent Labs   Lab 04/14/25  0739   ALT 16   AST 20   ALB 3.7       No results for input(s): \"PGLU\" in the last 168 hours.    No results for input(s): \"TROP\" in the last 168 hours.      Meds:     Scheduled Medications[1]  Medication Infusions[2]  PRN Medications[3]       Assessment/Plan:     74 year old male with PMH including but not limited to HTN, HL, Cholangiocarcinoma, KAREN, GERD who p/t [facility] ED c urinary retention.      Cholangiocarcinoma with peritoneal mets and recent recurrent cholangitis secondary to common hepatic duct stricture extending to right main hepatic duct with placement of 10 fr x 12 cm plastic biliary stent on 3/24/25  Ascites   brbpr.   - GI following, apprec  - ultrasound abd c mod degress ascites  - CMP mostly ok  - s/p para 4/15; f/u studies; cytology pending   - WBCs 1083, 54% N, empiric CTX started 4/15 plan for 5d course per GI, EOT 4/20  - resumed xarelto   - proctozone cream BID PRN for bleeding   - onc c/s   - sent blood cxs for completeness; one set c GPC in clusters, assume contaminant, re-sent blood cxs, NG x1d   - d/w MD Angel and MD Milvia, ascities has re-accumulated, repeat para; will need to set  up o/p frances v pleurex - possible pleurex on Monday   - CT a/p to assess progression reviewed c pt, omental carcinomatosis.   - PCT 0.25  - Bloating, needed lactulose and enema to finally have BM 4/19 PM  - now with recurrent bloating, trial relistor, scheduled colace and Gas X, update KUB     # Urinary retention  - d/w RAFAELA Carreon and Dr. Luna  - s/p cystoscopy, (complicated) placement of wilkerson catheter (over wire) 4/14  - Ucx NG <18hrs  - follow PVR  - flomax  -Continue with wilkerson catheter to gravity drainage.    - F/U with urology in 1 month for catheter removal or exchange.     Anemia, chronic  - chronic disease, malignancy   - monitor      # HL  -statin     # GERD  -PPI, wean as o/p if appropriate     # Major Depression/ Generalized anxiety d/o   -reviewed home meds, continue SSRI currently appears stable      # HTN  - resumed lisinopril     # Proph  - scds     Dispo: med onc, Pleurx placement anticipated Monday, possible dc Tuesday       Jose Saenz MD  Lake[facility] Ranch Medical Centerist  Message over [employer]  Pager: [phone]         [1]    methylnaltrexone  12 mg Subcutaneous Once    docusate sodium  100 mg Oral BID    rivaroxaban  20 mg Oral Daily with food    lisinopril  20 mg Oral Daily    cefTRIAXone  2 g Intravenous Q24H    pantoprazole  20 mg Oral QAM AC    tamsulosin  0.4 mg Oral BID    atorvastatin  10 mg Oral Nightly    venlafaxine ER  37.5 mg Oral Nightly   [2] [3]   ondansetron    simethicone    morphINE **OR** morphINE **OR** morphINE    tiZANidine    traMADol    HYDROcodone-acetaminophen    polyethylene glycol (PEG 3350)    bisacodyl    acetaminophen    hydrocortisone     caffeine

## 2025-04-20 NOTE — CM/SW NOTE
Awaiting MD to cosign commode order and add verbiage to progress note.    SW/CM to remain available for support and/or discharge planning.    Nette SANCHEZ LCSW  Discharge Planner

## 2025-04-20 NOTE — PROGRESS NOTES
Alert & oriented x4. VSS on room air. Calvo in place. Abdomen still distended but soft. Pt passing gas. Pt had large, loose incontinent bowel movement that relieved a lot of his pain. Tolerating diet. Ambulates with assist. Denies nausea/chest pain/SOB. Pain controlled per MAR. Patient updated on plan of care. Questions and concerns addressed. Safety precautions in place. Frequent rounds performed.

## 2025-04-21 LAB
INR BLD: 1.74 (ref 0.8–1.2)
PROTHROMBIN TIME: 20.5 SECONDS (ref 11.6–14.8)

## 2025-04-21 RX ORDER — LACTULOSE 10 G/15ML
30 SOLUTION ORAL ONCE
Status: COMPLETED | OUTPATIENT
Start: 2025-04-21 | End: 2025-04-21

## 2025-04-21 RX ORDER — LACTULOSE 10 G/15ML
30 SOLUTION ORAL
Status: DISPENSED | OUTPATIENT
Start: 2025-04-21 | End: 2025-04-21

## 2025-04-21 RX ORDER — POLYETHYLENE GLYCOL 3350 17 G/17G
17 POWDER, FOR SOLUTION ORAL 2 TIMES DAILY
Status: DISCONTINUED | OUTPATIENT
Start: 2025-04-21 | End: 2025-04-28

## 2025-04-21 NOTE — PLAN OF CARE
NEURO: A&Ox4. VSS. KAREN, CPAP, . Denies chest pain and SOB.   Telemetry: NSR.   GI: Abdomen soft,non distended. Passing gas. Denies nausea  : Calvo in place draining clear yellow urine  PAIN: controlled with PRN pain medications  AMB: Up 1 assist  DIET: Regular diet  FLUIDS: IVFs / abx running per order    All appropriate safety measures in place. Pt updated on POC, call light within reach.

## 2025-04-21 NOTE — PROGRESS NOTES
[facility] Hospitalist Progress Note     PCP: Arlene Bruce MD    Chief Complaint: follow-up   Follow up for: The primary encounter diagnosis was Urinary retention. Diagnoses of Urine retention, Anemia, unspecified type, Bright red blood per rectum, and Cholangiocarcinoma (HCC) were also pertinent to this visit.    Overnight/Interim Events:      SUBJECTIVE:  Patient seen and examined.  Frustrated about the delay in pleurX placement.   Passing some gas.   Wife bedside. Also d/w daughter over the phone.   Denies CP/SOB.  NAD.       OBJECTIVE:  Temp:  [97.8 °F (36.6 °C)-98.6 °F (37 °C)] 98.6 °F (37 °C)  Pulse:  [68-78] 69  Resp:  [18-20] 20  BP: ()/(47-57) 96/47  SpO2:  [95 %-98 %] 98 %    Intake/Output:    Intake/Output Summary (Last 24 hours) at 4/21/2025 1422  Last data filed at 4/21/2025 0905  Gross per 24 hour   Intake 699 ml   Output 375 ml   Net 324 ml       Last 3 Weights   04/17/25 0751 200 lb 13.4 oz (91.1 kg)   04/16/25 0622 200 lb 14.4 oz (91.1 kg)   04/14/25 0636 200 lb (90.7 kg)   04/14/25 0625 200 lb (90.7 kg)   03/24/25 0803 202 lb (91.6 kg)   03/11/25 1712 202 lb (91.6 kg)   02/24/25 1041 205 lb (93 kg)   02/12/25 1050 205 lb (93 kg)       Exam    General: Alert, no distress, appears stated age.     Head:  Normocephalic, without obvious abnormality, atraumatic.   Eyes:  Sclera anicteric, EOMs intact.    Nose: Nares normal,  Mucosa normal    Throat: Lips normal   Neck: Supple, symmetrical, trachea midline   Lungs:   Clear to auscultation bilaterally. Normal effort   Chest wall:  No tenderness or deformity   Heart:  Regular rate and rhythm, S1, S2 normal, no murmur, rub or gallop appreciated   Abdomen:   Softly distended, +tympanic to percussion, NT/ND, Bowel sounds normal. No masses,  No organomegaly.      Extremities: Extremities normal, atraumatic, no cyanosis or LE edema.   Skin: Skin color, texture, turgor normal. No rashes or lesions.    Neurologic: Moving all extremities spontaneously,  no focal deficit appreciated      Data Review:       Labs:     Recent Labs   Lab 04/15/25  0557 04/16/25  1227 04/17/25  1516 04/17/25  1517 04/20/25  0513 04/21/25  0830   WBC 9.4 4.8  --  5.1 6.7  --    HGB 9.5* 9.9*  --  9.8* 11.0*  --    MCV 85.8 85.9  --  86.2 84.0  --    .0 280.0  --  336.0 449.0  --    INR 1.92* 2.84* 2.94*  --   --  1.74*       Recent Labs   Lab 04/15/25  0557 04/16/25  1227 04/17/25  1516 04/19/25  0606 04/20/25  0513    141 141  --  139   K 4.8 4.5 4.4  --  4.5    109 109  --  107   CO2 24.0 26.0 25.0  --  24.0   BUN 24* 20 20  --  18   CREATSERUM 1.21 0.99 0.97  --  1.18   CA 9.4 9.2 9.2  --  9.6   MG 1.9 1.8 1.7 1.6 1.9   * 95 112*  --  138*       No results for input(s): \"ALT\", \"AST\", \"ALB\", \"AMYLASE\", \"LIPASE\", \"LDH\" in the last 168 hours.    Invalid input(s): \"ALPHOS\", \"TBIL\", \"DBIL\", \"TPROT\"      No results for input(s): \"PGLU\" in the last 168 hours.    No results for input(s): \"TROP\" in the last 168 hours.      Meds:     Scheduled Medications[1]  Medication Infusions[2]  PRN Medications[3]       Assessment/Plan:     74 year old male with PMH including but not limited to HTN, HL, Cholangiocarcinoma, KAREN, GERD who p/t [facility] ED c urinary retention.      Cholangiocarcinoma with peritoneal mets and recent recurrent cholangitis secondary to common hepatic duct stricture extending to right main hepatic duct with placement of 10 fr x 12 cm plastic biliary stent on 3/24/25  Ascites   brbpr.   - GI following, apprec  - ultrasound abd c mod degress ascites  - CMP mostly ok  - s/p para 4/15; f/u studies; cytology pending   - WBCs 1083, 54% N, empiric CTX started 4/15 plan for 5d course per GI, EOT 4/20  - resumed xarelto   - proctozone cream BID PRN for bleeding   - onc c/s   - sent blood cxs for completeness; one set c GPC in clusters, assume contaminant, re-sent blood cxs, NG x1d   - d/w MD Angel and MD Milvia, ascities has re-accumulated, repeat para; will need to  set up o/p frances v pleurex - possible pleurex on Monday   - CT a/p to assess progression reviewed c pt, omental carcinomatosis.   - PCT 0.25  - Bloating, needed lactulose and enema to finally have BM 4/19 PM  - encouraged ambulation, BID colace, relistor to help with bowel motility     # Urinary retention  - d/w RAFAELA Carreon and Dr. Luna  - s/p cystoscopy, (complicated) placement of wilkerson catheter (over wire) 4/14  - Ucx NG <18hrs  - follow PVR  - flomax  -Continue with wilkerson catheter to gravity drainage.    - F/U with urology in 1 month for catheter removal or exchange.     Anemia, chronic  - chronic disease, malignancy   - monitor      # HL  -statin     # GERD  -PPI, wean as o/p if appropriate     # Major Depression/ Generalized anxiety d/o   -reviewed home meds, continue SSRI currently appears stable      # HTN  - resumed lisinopril     # Proph  - scds     Dispo: med onc, Pleurx placement tomorrow, NPO after midnight, xarelto held       Jose Saenz MD  [facility]ist  Message over Staff [employer]  Pager: [phone]      A three in one commode has been ordered to assist Bruce R Salus in activities of daily living. The patient they are confined to one level of their home and there is no toilet on that level.        [1]    polyethylene glycol (PEG 3350)  17 g Oral BID    lactulose  30 g Oral Q2H    methylnaltrexone  6 mg Subcutaneous Q48H    docusate sodium  100 mg Oral BID    [Held by provider] rivaroxaban  20 mg Oral Daily with food    lisinopril  20 mg Oral Daily    cefTRIAXone  2 g Intravenous Q24H    pantoprazole  20 mg Oral QAM AC    tamsulosin  0.4 mg Oral BID    atorvastatin  10 mg Oral Nightly    venlafaxine ER  37.5 mg Oral Nightly   [2] [3]   ondansetron    simethicone    morphINE **OR** morphINE **OR** morphINE    tiZANidine    traMADol    HYDROcodone-acetaminophen    polyethylene glycol (PEG 3350)    bisacodyl    acetaminophen    hydrocortisone

## 2025-04-21 NOTE — PROGRESS NOTES
[facility]  Progress Note    Bruce R Salus Patient Status:  Inpatient    10/31/1950 MRN [id number]   Location [facility] 3NW-A Attending Scarlett Saenz*   Hosp Day # 6 PCP Arlene Bruce MD     Subjective:  Patient seen sitting in chair.  He feels about the same.  He is supposed to have a abdominal Pleurx drain placed soon.    Objective:  Blood pressure 102/54, pulse 72, temperature 98.2 °F (36.8 °C), temperature source Oral, resp. rate 20, height 5' 11\" (1.803 m), weight 200 lb 13.4 oz (91.1 kg), SpO2 95%.    Gen: sitting in justin, appears fatigued  HEENT: normocephalic  CV: regular rate  Lungs: CTA   Abd: moderately distended   Extrem: no BLE edema  Skin: No rash  Neuro: AOx3    Labs:    Lab Results   Component Value Date    WBC 6.7 2025    HGB 11.0 (L) 2025    HCT 34.0 (L) 2025    .0 2025    NEPERCENT 72.2 2025    LYPERCENT 11.6 2025    MOPERCENT 14.6 2025    EOPERCENT 0.3 2025    BAPERCENT 0.4 2025    NE 4.85 2025    LYMABS 0.78 (L) 2025    MOABSO 0.98 2025    EOABSO 0.02 2025    BAABSO 0.03 2025           Assessment and Plan:  Metastatic cholangiocarcinoma  - currently on FOLFIRI  - no current plans for intpt chemo  - follow-up Dr. Cassidy on discharge    Recurrent ascites  - plan for abdominal pleurx soon    Anemia  - Hgb stable  - cont to monitor    CVC-associated DVT  - cont Xarelto 20mg daily - HOLD for procedure    Coagulopathy  - INR elevated  - will give Vitamin K 5mg PO x1 as pt also on Xarelto and having pleurx placed    CoNS bacteremia  - /2 BlcX from 4/15 positive  - could be contaminant  - abx per ID    Thank you for allowing me to participate in the care of this patient.    Natalia Diehl MD  [facility]  Department of Oncology and Hematology

## 2025-04-21 NOTE — PROGRESS NOTES
Alert & oriented x4.With tolerable pain.Abdomen distended ,soft.Passing flatus.Tolerated diet.instructed to be NPO after midnight for Insertion of Pleur X drain and draining abdomen tomorrow.Verified procedure time with IR Lead /Brandi Roberson tomorrow at 0830 am.Patient ,wife and his daughter updated on procedure time and prep.Procedure was not done earlier since patient ate breakfast and Xarelto wasn't held and LAtest INR was 2.94.Xarelto was held by .  Patient was up in chair earlier but refused to walk in hallway.

## 2025-04-21 NOTE — DIETARY NOTE
[facility]   part of [facility]   CLINICAL NUTRITION    Bruce R Salus     Admitting diagnosis:  Urinary retention [R33.9]    Ht: 180.3 cm (5' 11\")  Wt: 91.1 kg (200 lb 13.4 oz).   Body mass index is 28.01 kg/m².  IBW: 78.2kg    Wt Readings from Last 20 Encounters:   04/17/25 91.1 kg (200 lb 13.4 oz)   03/24/25 91.6 kg (202 lb)   02/24/25 93 kg (205 lb)   10/14/24 89.8 kg (198 lb)   08/28/24 92.1 kg (203 lb)   07/11/24 93 kg (205 lb 0.4 oz)   07/03/24 93 kg (205 lb)   02/20/23 90.3 kg (199 lb)   02/03/22 91.7 kg (202 lb 3.2 oz)   10/08/21 93 kg (205 lb)   08/24/21 93 kg (205 lb)   07/30/21 96.2 kg (212 lb)   07/21/21 93 kg (205 lb)   05/07/21 90.7 kg (200 lb)   03/11/21 90.7 kg (200 lb)   02/09/21 90.7 kg (200 lb)   01/11/21 90.7 kg (200 lb)   07/24/20 88.5 kg (195 lb)   02/06/20 90.7 kg (200 lb)   10/28/19 93.1 kg (205 lb 3.2 oz)       Labs/Meds reviewed    Diet:       Procedures    Regular/General diet Is Patient on Accuchecks? No    NPO     Percent Meals Eaten (last 3 days)       Date/Time Percent Meals Eaten (%)    04/18/25 1300 50 %    04/18/25 1632 100 %    04/18/25 2350 0 %    04/19/25 1238 100 %    04/20/25 0131 0 %    04/20/25 0638 0 %    04/20/25 1713 25 %    04/21/25 0905 75 %            Pt chart reviewed d/t LOS.  Attempted visit, though pt appeared occupied on the phone. Dtr asked RD to return once diet advanced. NPO for pleurx.  Nursing notes reports Percent Meals Eaten (%): 75 % intake for last meal.  Tolerating po diet without diarrhea, emesis, or constipation.   No significant weight changes noted.     Patient is at low nutrition risk at this time.    Please consult if patient status changes or nutrition issues arise.    Nelda Ray RD, LDN  Clinical Nutrition  [phone]

## 2025-04-22 ENCOUNTER — APPOINTMENT (OUTPATIENT)
Dept: CT IMAGING | Facility: HOSPITAL | Age: 75
DRG: 374 | End: 2025-04-22
Attending: HOSPITALIST
Payer: MEDICARE

## 2025-04-22 ENCOUNTER — APPOINTMENT (OUTPATIENT)
Dept: INTERVENTIONAL RADIOLOGY/VASCULAR | Facility: HOSPITAL | Age: 75
DRG: 374 | End: 2025-04-22
Attending: INTERNAL MEDICINE
Payer: MEDICARE

## 2025-04-22 LAB
ANION GAP SERPL CALC-SCNC: 7 MMOL/L (ref 0–18)
BASOPHILS # BLD AUTO: 0.06 X10(3) UL (ref 0–0.2)
BASOPHILS NFR BLD AUTO: 0.8 %
BUN BLD-MCNC: 21 MG/DL (ref 9–23)
CALCIUM BLD-MCNC: 9.5 MG/DL (ref 8.7–10.6)
CHLORIDE SERPL-SCNC: 106 MMOL/L (ref 98–112)
CO2 SERPL-SCNC: 25 MMOL/L (ref 21–32)
CREAT BLD-MCNC: 1.11 MG/DL (ref 0.7–1.3)
EGFRCR SERPLBLD CKD-EPI 2021: 69 ML/MIN/1.73M2 (ref 60–?)
EOSINOPHIL # BLD AUTO: 0.07 X10(3) UL (ref 0–0.7)
EOSINOPHIL NFR BLD AUTO: 0.9 %
ERYTHROCYTE [DISTWIDTH] IN BLOOD BY AUTOMATED COUNT: 15.9 %
GLUCOSE BLD-MCNC: 123 MG/DL (ref 70–99)
HCT VFR BLD AUTO: 36.9 % (ref 39–53)
HGB BLD-MCNC: 11.4 G/DL (ref 13–17.5)
IMM GRANULOCYTES # BLD AUTO: 0.11 X10(3) UL (ref 0–1)
IMM GRANULOCYTES NFR BLD: 1.4 %
INR BLD: 1.43 (ref 0.8–1.2)
LYMPHOCYTES # BLD AUTO: 0.97 X10(3) UL (ref 1–4)
LYMPHOCYTES NFR BLD AUTO: 12.7 %
MCH RBC QN AUTO: 26.5 PG (ref 26–34)
MCHC RBC AUTO-ENTMCNC: 30.9 G/DL (ref 31–37)
MCV RBC AUTO: 85.8 FL (ref 80–100)
MONOCYTES # BLD AUTO: 1.23 X10(3) UL (ref 0.1–1)
MONOCYTES NFR BLD AUTO: 16.2 %
NEUTROPHILS # BLD AUTO: 5.17 X10 (3) UL (ref 1.5–7.7)
NEUTROPHILS # BLD AUTO: 5.17 X10(3) UL (ref 1.5–7.7)
NEUTROPHILS NFR BLD AUTO: 68 %
OSMOLALITY SERPL CALC.SUM OF ELEC: 290 MOSM/KG (ref 275–295)
PLATELET # BLD AUTO: 516 10(3)UL (ref 150–450)
POTASSIUM SERPL-SCNC: 4.5 MMOL/L (ref 3.5–5.1)
PROTHROMBIN TIME: 17.6 SECONDS (ref 11.6–14.8)
RBC # BLD AUTO: 4.3 X10(6)UL (ref 3.8–5.8)
SODIUM SERPL-SCNC: 138 MMOL/L (ref 136–145)
WBC # BLD AUTO: 7.6 X10(3) UL (ref 4–11)

## 2025-04-22 PROCEDURE — 74177 CT ABD & PELVIS W/CONTRAST: CPT | Performed by: HOSPITALIST

## 2025-04-22 PROCEDURE — 0W9G30Z DRAINAGE OF PERITONEAL CAVITY WITH DRAINAGE DEVICE, PERCUTANEOUS APPROACH: ICD-10-PCS | Performed by: STUDENT IN AN ORGANIZED HEALTH CARE EDUCATION/TRAINING PROGRAM

## 2025-04-22 RX ORDER — SODIUM PHOSPHATE, DIBASIC AND SODIUM PHOSPHATE, MONOBASIC 7; 19 G/230ML; G/230ML
1 ENEMA RECTAL ONCE
Status: DISCONTINUED | OUTPATIENT
Start: 2025-04-22 | End: 2025-04-23

## 2025-04-22 RX ORDER — CEFAZOLIN SODIUM 1 G/3ML
INJECTION, POWDER, FOR SOLUTION INTRAMUSCULAR; INTRAVENOUS
Status: COMPLETED
Start: 2025-04-22 | End: 2025-04-22

## 2025-04-22 RX ORDER — IOPAMIDOL 755 MG/ML
100 INJECTION, SOLUTION INTRAVASCULAR
Status: COMPLETED | OUTPATIENT
Start: 2025-04-22 | End: 2025-04-22

## 2025-04-22 RX ORDER — HYDROMORPHONE HYDROCHLORIDE 1 MG/ML
INJECTION, SOLUTION INTRAMUSCULAR; INTRAVENOUS; SUBCUTANEOUS
Status: COMPLETED
Start: 2025-04-22 | End: 2025-04-22

## 2025-04-22 RX ORDER — MIDAZOLAM HYDROCHLORIDE 1 MG/ML
INJECTION INTRAMUSCULAR; INTRAVENOUS
Status: COMPLETED
Start: 2025-04-22 | End: 2025-04-22

## 2025-04-22 RX ORDER — LIDOCAINE HYDROCHLORIDE 10 MG/ML
INJECTION, SOLUTION INFILTRATION; PERINEURAL
Status: COMPLETED
Start: 2025-04-22 | End: 2025-04-22

## 2025-04-22 RX ORDER — SODIUM PHOSPHATE, DIBASIC AND SODIUM PHOSPHATE, MONOBASIC 7; 19 G/230ML; G/230ML
1 ENEMA RECTAL ONCE
Status: COMPLETED | OUTPATIENT
Start: 2025-04-22 | End: 2025-04-22

## 2025-04-22 RX ORDER — SODIUM CHLORIDE 9 MG/ML
INJECTION, SOLUTION INTRAVENOUS CONTINUOUS
Status: ACTIVE | OUTPATIENT
Start: 2025-04-22 | End: 2025-04-22

## 2025-04-22 RX ORDER — LIDOCAINE HYDROCHLORIDE AND EPINEPHRINE BITARTRATE 20; .01 MG/ML; MG/ML
INJECTION, SOLUTION SUBCUTANEOUS
Status: COMPLETED
Start: 2025-04-22 | End: 2025-04-22

## 2025-04-22 NOTE — CM/SW NOTE
CM noted anticipated dc Wed per chart review.     DME referral updated with signed MD order and note addressing medical necessity.     HHC orders added to HH referral. Pt with new abdominal Pleur-x placed 4/21. [facility] form signed by Radiologist. Dr. Diehl contacted to confirm pt's primary Oncologist, Dr. Cassidy will be the ordering physician following DC. Form updated with providers information and faxed to [facility]. Form also attached to pt's HHC referral in [facility].     HH choice list left at pt's bedside, (pt sleeping at the time) RN will d/w pt/sps and reach out to CM to follow up.     CM/SW will remain available for DC planning and/or support.     MARCOS Baptiste, CMSRN    [phone]

## 2025-04-22 NOTE — PROGRESS NOTES
Patient is on room air, utilizing CPAP while asleep, pain managed w/ Norco, NPO status in place w/ patient aware, up with standby assist, voiding via wilkerson catheter. Bowel sounds active w/ patient reporting passing flatus; Miralax, Colace, and Lactulose given to expedite bowel function.     Patient updated on plan of care, Pleurx catheter to be placed in AM.     0222: Patient moaning out in pain, requested emesis basin for nausea. When offered antiemetic and IV pain medication, patient declined and stated he wants to \"ride it out\" until his procedure in the AM.

## 2025-04-22 NOTE — INTERVAL H&P NOTE
The above referenced H&P was reviewed by Damien Rebollar MD on 4/22/2025, the patient was examined and no significant changes have occurred in the patient's condition since the H&P was performed.  Risks, benefits, alternative treatments and consequences of no treatment were discussed.  We will proceed with procedure as planned.  Mallampati: 2  Cardiac: Regular rate  Respiratory: Non-labored  ASA: 3      Damien Rebollar MD  4/22/2025  8:26 AM

## 2025-04-22 NOTE — PHYSICAL THERAPY NOTE
Attempted to see pt for f/u therapy session. Pt politely declining at this time due to incr pain after pleurx drain and drainage of abdomen today. Will re-attempt at later date pending medical stability and willingness to participate. RN aware.

## 2025-04-22 NOTE — PROGRESS NOTES
Patient has IVF infusing, on room air, on telemetry running NSR, wilkerson in place with clear yellow urine, unable to have decent DM despite being given laxatives and stool softeners-will be given fleets enemas x2 tonight, PleurX cath placed today and 4.5L ascitic fluid removed, Tramadol and Morphine given for pain, ambulates with standby assist, on a regular diet but has yet to eat today. Patient and family updated on plan of care.

## 2025-04-22 NOTE — PROGRESS NOTES
[facility]  Progress Note    Bruce R Salus Patient Status:  Inpatient    10/31/1950 MRN [id number]   Location [facility] 3NW-A Attending Scarlett Saenz*   Hosp Day # 7 PCP Arlene Bruce MD     Subjective:  Patient had increased abdominal distention and pain overnight.  He is going for Pleurx today.  Xarelto was held yesterday.  He was given vitamin K for elevated INR yesterday.  His INR is 1.43 today.    Objective:  Blood pressure 119/61, pulse 83, temperature 98.4 °F (36.9 °C), temperature source Oral, resp. rate 18, height 5' 11\" (1.803 m), weight 200 lb 13.4 oz (91.1 kg), SpO2 98%.    Gen: lying in chair, in mild distress  HEENT: normocephalic  CV: regular rate  Lungs: CTA   Abd: moderately distended   Extrem: no BLE edema  Skin: No rash  Neuro: AOx3    Labs:   Lab Results   Component Value Date    WBC 7.6 2025    HGB 11.4 2025    HCT 36.9 2025    .0 2025    CREATSERUM 1.11 2025    BUN 21 2025     2025    K 4.5 2025     2025    CO2 25.0 2025     2025    CA 9.5 2025    INR 1.43 2025    PTP 17.6 2025     Lab Results   Component Value Date    WBC 7.6 2025    HGB 11.4 (L) 2025    HCT 36.9 (L) 2025    .0 (H) 2025    NEPERCENT 68.0 2025    LYPERCENT 12.7 2025    MOPERCENT 16.2 2025    EOPERCENT 0.9 2025    BAPERCENT 0.8 2025    NE 5.17 2025    LYMABS 0.97 (L) 2025    MOABSO 1.23 (H) 2025    EOABSO 0.07 2025    BAABSO 0.06 2025           Assessment and Plan:  Metastatic cholangiocarcinoma  - currently on FOLFIRI  - no current plans for intpt chemo  - follow-up Dr. Cassidy on discharge    Recurrent ascites  - plan for abdominal pleurx today    Anemia  - Hgb stable  - cont to monitor    CVC-associated DVT  - cont Xarelto 20mg daily - HOLD for procedure    Coagulopathy  - INR elevated  - s/p  Vitamin K 5mg PO x1     CoNS bacteremia  - 1/2 BlcX from 4/15 positive  - could be contaminant  - abx per ID    Thank you for allowing me to participate in the care of this patient.    Natalia Diehl MD  [facility]  Department of Oncology and Hematology

## 2025-04-22 NOTE — PROGRESS NOTES
[facility] Hospitalist Progress Note     PCP: Arlene Bruce MD    Chief Complaint: follow-up   Follow up for: The primary encounter diagnosis was Urinary retention. Diagnoses of Urine retention, Anemia, unspecified type, Bright red blood per rectum, and Cholangiocarcinoma (HCC) were also pertinent to this visit.    Overnight/Interim Events:      SUBJECTIVE:  Patient seen and examined.  PleurX placed this AM by IR. 4L removed.  Feeling better.  Still reporting bloating and minimal BM for last few days.   Wife bedside. Also d/w daughter over the phone.   Denies CP/SOB.  NAD.       OBJECTIVE:  Temp:  [97.8 °F (36.6 °C)-98.6 °F (37 °C)] 97.8 °F (36.6 °C)  Pulse:  [66-83] 76  Resp:  [18] 18  BP: ()/(47-62) 110/62  SpO2:  [95 %-98 %] 95 %    Intake/Output:    Intake/Output Summary (Last 24 hours) at 4/22/2025 1029  Last data filed at 4/22/2025 0800  Gross per 24 hour   Intake 470 ml   Output 4960 ml   Net -4490 ml       Last 3 Weights   04/17/25 0751 200 lb 13.4 oz (91.1 kg)   04/16/25 0622 200 lb 14.4 oz (91.1 kg)   04/14/25 0636 200 lb (90.7 kg)   04/14/25 0625 200 lb (90.7 kg)   03/24/25 0803 202 lb (91.6 kg)   03/11/25 1712 202 lb (91.6 kg)   02/24/25 1041 205 lb (93 kg)   02/12/25 1050 205 lb (93 kg)       Exam    General: Alert, no distress, appears stated age.     Head:  Normocephalic, without obvious abnormality, atraumatic.   Eyes:  Sclera anicteric, EOMs intact.    Nose: Nares normal,  Mucosa normal    Throat: Lips normal   Neck: Supple, symmetrical, trachea midline   Lungs:   Clear to auscultation bilaterally. Normal effort   Chest wall:  No tenderness or deformity   Heart:  Regular rate and rhythm, S1, S2 normal, no murmur, rub or gallop appreciated   Abdomen:   Soft, some +tympanic to percussion, NT/ND, Bowel sounds normal. No masses,  No organomegaly.      Extremities: Extremities normal, atraumatic, no cyanosis or LE edema.   Skin: Skin color, texture, turgor normal. No rashes or lesions.     Neurologic: Moving all extremities spontaneously, no focal deficit appreciated      Data Review:       Labs:     Recent Labs   Lab 04/16/25  1227 04/17/25  1516 04/17/25  1517 04/20/25  0513 04/21/25  0830 04/22/25  0538   WBC 4.8  --  5.1 6.7  --  7.6   HGB 9.9*  --  9.8* 11.0*  --  11.4*   MCV 85.9  --  86.2 84.0  --  85.8   .0  --  336.0 449.0  --  516.0*   INR 2.84* 2.94*  --   --  1.74* 1.43*       Recent Labs   Lab 04/16/25  1227 04/17/25  1516 04/19/25  0606 04/20/25  0513 04/22/25  0538    141  --  139 138   K 4.5 4.4  --  4.5 4.5    109  --  107 106   CO2 26.0 25.0  --  24.0 25.0   BUN 20 20  --  18 21   CREATSERUM 0.99 0.97  --  1.18 1.11   CA 9.2 9.2  --  9.6 9.5   MG 1.8 1.7 1.6 1.9  --    GLU 95 112*  --  138* 123*       No results for input(s): \"ALT\", \"AST\", \"ALB\", \"AMYLASE\", \"LIPASE\", \"LDH\" in the last 168 hours.    Invalid input(s): \"ALPHOS\", \"TBIL\", \"DBIL\", \"TPROT\"      No results for input(s): \"PGLU\" in the last 168 hours.    No results for input(s): \"TROP\" in the last 168 hours.      Meds:     Scheduled Medications[1]  Medication Infusions[2]  PRN Medications[3]       Assessment/Plan:     74 year old male with PMH including but not limited to HTN, HL, Cholangiocarcinoma, KAREN, GERD who p/t [facility] ED c urinary retention.      Cholangiocarcinoma with peritoneal mets and recent recurrent cholangitis secondary to common hepatic duct stricture extending to right main hepatic duct with placement of 10 fr x 12 cm plastic biliary stent on 3/24/25  Ascites   brbpr.   - GI following, apprec  - ultrasound abd c mod degress ascites  - CMP mostly ok  - s/p para 4/15; f/u studies; cytology pending   - WBCs 1083, 54% N, empiric CTX started 4/15 plan for 5d course per GI, EOT 4/20  - resumed xarelto (held for pleurex placement   - proctozone cream BID PRN for bleeding   - onc c/s   - sent blood cxs for completeness; one set c GPC in clusters, assume contaminant, re-sent blood cxs, NG x1d   - d/w  MD Angel and MD Milvia, ascities has re-accumulated, repeat para; will need to set up o/p frances v pleurex   - s/p IR pleurX placement 4/22, 4L removed  - CT a/p to assess progression reviewed c pt, omental carcinomatosis.   - PCT 0.25  - Bloating, needed lactulose and enema to finally have BM 4/19 PM  - encouraged ambulation, BID colace, relistor to help with bowel motility   - given ongoing bloating and poor flatulence/BM, will repeat CT A/P to evaluate for ileus/SBO, may need to reconsult GI     # Urinary retention  - d/w RAFAELA Carreon and Dr. Luna  - s/p cystoscopy, (complicated) placement of wilkerson catheter (over wire) 4/14  - Ucx NG <18hrs  - follow PVR  - flomax  -Continue with wilkerson catheter to gravity drainage.    - F/U with urology in 1 month for catheter removal or exchange.     Anemia, chronic  - chronic disease, malignancy   - monitor      # HL  -statin     # GERD  -PPI, wean as o/p if appropriate     # Major Depression/ Generalized anxiety d/o   -reviewed home meds, continue SSRI currently appears stable      # HTN  - resumed lisinopril     # Proph  - scds     Dispo: med onc,         Jose Saenz MD  [facility]  Message over [employer]  Pager: [phone]          [1]    polyethylene glycol (PEG 3350)  17 g Oral BID    methylnaltrexone  6 mg Subcutaneous Q48H    docusate sodium  100 mg Oral BID    [Held by provider] rivaroxaban  20 mg Oral Daily with food    lisinopril  20 mg Oral Daily    pantoprazole  20 mg Oral QAM AC    tamsulosin  0.4 mg Oral BID    atorvastatin  10 mg Oral Nightly    venlafaxine ER  37.5 mg Oral Nightly   [2]    sodium chloride     [3]   ondansetron    simethicone    morphINE **OR** morphINE **OR** morphINE    tiZANidine    traMADol    HYDROcodone-acetaminophen    polyethylene glycol (PEG 3350)    bisacodyl    acetaminophen    hydrocortisone

## 2025-04-22 NOTE — PROGRESS NOTES
1600: Paged  with CT results. Received order to consult .     1615: Paged  to notify of new consult.Order received for fleets enemas x2.

## 2025-04-22 NOTE — PROCEDURES
[facility]   part of [facility]  Procedure Note    Bruce R Salus Patient Status:  Inpatient    10/31/1950 MRN [id number]   Location [facility] INTERVENTIONAL SUITES Attending Scarlett Saenz*   Hosp Day # 7 PCP Arlene Bruce MD     Procedure: Abdominal PleurX placement    Pre-Procedure Diagnosis:  Malignant ascites    Post-Procedure Diagnosis: Same    Anesthesia:  Local and Sedation    Findings:    Moderate ascites.  Successful placement of tunneled abdominal pleurX, ready to use.  Return of yellow serous fluid.    Specimens: None    Blood Loss:  <5ml    Tourniquet Time: 0  Complications:  None  Drains:  As above    Secondary Diagnosis:  None    Damien Rebollar MD  2025

## 2025-04-23 PROBLEM — Z51.5 PALLIATIVE CARE ENCOUNTER: Status: ACTIVE | Noted: 2025-04-23

## 2025-04-23 PROBLEM — Z71.89 GOALS OF CARE, COUNSELING/DISCUSSION: Status: ACTIVE | Noted: 2025-04-23

## 2025-04-23 LAB
ALBUMIN SERPL-MCNC: 3.3 G/DL (ref 3.2–4.8)
ALBUMIN/GLOB SERPL: 1.3 {RATIO} (ref 1–2)
ALP LIVER SERPL-CCNC: 161 U/L (ref 45–117)
ALT SERPL-CCNC: 23 U/L (ref 10–49)
ANION GAP SERPL CALC-SCNC: 9 MMOL/L (ref 0–18)
AST SERPL-CCNC: 33 U/L (ref ?–34)
BASOPHILS # BLD AUTO: 0.06 X10(3) UL (ref 0–0.2)
BASOPHILS NFR BLD AUTO: 0.6 %
BILIRUB SERPL-MCNC: 0.3 MG/DL (ref 0.2–1.1)
BUN BLD-MCNC: 22 MG/DL (ref 9–23)
CALCIUM BLD-MCNC: 9.6 MG/DL (ref 8.7–10.6)
CHLORIDE SERPL-SCNC: 102 MMOL/L (ref 98–112)
CO2 SERPL-SCNC: 23 MMOL/L (ref 21–32)
CREAT BLD-MCNC: 1.28 MG/DL (ref 0.7–1.3)
EGFRCR SERPLBLD CKD-EPI 2021: 58 ML/MIN/1.73M2 (ref 60–?)
EOSINOPHIL # BLD AUTO: 0.05 X10(3) UL (ref 0–0.7)
EOSINOPHIL NFR BLD AUTO: 0.5 %
ERYTHROCYTE [DISTWIDTH] IN BLOOD BY AUTOMATED COUNT: 15.9 %
GLOBULIN PLAS-MCNC: 2.5 G/DL (ref 2–3.5)
GLUCOSE BLD-MCNC: 125 MG/DL (ref 70–99)
HCT VFR BLD AUTO: 38.7 % (ref 39–53)
HGB BLD-MCNC: 12.5 G/DL (ref 13–17.5)
IMM GRANULOCYTES # BLD AUTO: 0.18 X10(3) UL (ref 0–1)
IMM GRANULOCYTES NFR BLD: 1.7 %
LYMPHOCYTES # BLD AUTO: 1.17 X10(3) UL (ref 1–4)
LYMPHOCYTES NFR BLD AUTO: 10.9 %
MCH RBC QN AUTO: 27.4 PG (ref 26–34)
MCHC RBC AUTO-ENTMCNC: 32.3 G/DL (ref 31–37)
MCV RBC AUTO: 84.9 FL (ref 80–100)
MONOCYTES # BLD AUTO: 1.5 X10(3) UL (ref 0.1–1)
MONOCYTES NFR BLD AUTO: 13.9 %
NEUTROPHILS # BLD AUTO: 7.82 X10 (3) UL (ref 1.5–7.7)
NEUTROPHILS # BLD AUTO: 7.82 X10(3) UL (ref 1.5–7.7)
NEUTROPHILS NFR BLD AUTO: 72.4 %
OSMOLALITY SERPL CALC.SUM OF ELEC: 283 MOSM/KG (ref 275–295)
PLATELET # BLD AUTO: 581 10(3)UL (ref 150–450)
POTASSIUM SERPL-SCNC: 5.2 MMOL/L (ref 3.5–5.1)
PROT SERPL-MCNC: 5.8 G/DL (ref 5.7–8.2)
RBC # BLD AUTO: 4.56 X10(6)UL (ref 3.8–5.8)
SODIUM SERPL-SCNC: 134 MMOL/L (ref 136–145)
WBC # BLD AUTO: 10.8 X10(3) UL (ref 4–11)

## 2025-04-23 PROCEDURE — 99497 ADVNCD CARE PLAN 30 MIN: CPT | Performed by: CLINICAL NURSE SPECIALIST

## 2025-04-23 PROCEDURE — 99221 1ST HOSP IP/OBS SF/LOW 40: CPT | Performed by: CLINICAL NURSE SPECIALIST

## 2025-04-23 RX ORDER — CALCIUM CARBONATE 500 MG/1
1000 TABLET, CHEWABLE ORAL ONCE
Status: COMPLETED | OUTPATIENT
Start: 2025-04-23 | End: 2025-04-23

## 2025-04-23 RX ORDER — SODIUM PHOSPHATE, DIBASIC AND SODIUM PHOSPHATE, MONOBASIC 7; 19 G/230ML; G/230ML
1 ENEMA RECTAL ONCE
Status: COMPLETED | OUTPATIENT
Start: 2025-04-23 | End: 2025-04-23

## 2025-04-23 RX ORDER — CALCIUM CARBONATE 500 MG/1
1000 TABLET, CHEWABLE ORAL EVERY 6 HOURS PRN
Status: DISCONTINUED | OUTPATIENT
Start: 2025-04-23 | End: 2025-04-28

## 2025-04-23 RX ORDER — CALCIUM CARBONATE 500 MG/1
500 TABLET, CHEWABLE ORAL EVERY 6 HOURS PRN
Status: DISCONTINUED | OUTPATIENT
Start: 2025-04-23 | End: 2025-04-23

## 2025-04-23 NOTE — PHYSICAL THERAPY NOTE
PHYSICAL THERAPY TREATMENT NOTE - INPATIENT    Room Number: 321/321-A     Session: 1     Number of Visits to Meet Established Goals: 3    Presenting Problem: urinary retention  Co-Morbidities : HTN, HL, cholangiocarcinoma, KAREN, GERD    PHYSICAL THERAPY ASSESSMENT   Patient demonstrates fair progress this session, goals  remain in progress.      Patient is requiring stand-by assist and contact guard assist as a result of the following impairments: decreased functional strength, decreased endurance/aerobic capacity, pain, and dizziness .     Patient continues to function below baseline with gait and stair negotiation.  Next session anticipate patient to progress gait.  Physical Therapy will continue to follow patient for duration of hospitalization.    Patient continues to benefit from continued skilled PT services: at discharge to promote prior level of function and safety with additional support and return home with home health PT.    PLAN DURING HOSPITALIZATION  Nursing Mobility Recommendation : 1 Assist     PT Treatment Plan: Bed mobility, Body mechanics, Endurance, Energy conservation, Patient education, Neuromuscular re-educate, Gait training, Range of motion, Stoop training, Strengthening, Stair training, Transfer training, Balance training  Frequency (Obs):  (1-2x/week)     CURRENT GOALS       Goal #1 Patient is able to demonstrate supine - sit EOB @ level: modified independent  met   Goal #2 Patient is able to demonstrate transfers Sit to/from Stand at assistance level: modified independent     Goal #3 Patient is able to ambulate 150 feet with assist device: LRD at assistance level: modified independent     Goal #4    Goal #5    Goal #6    Goal Comments: Goals established on 4/19/2025      PHYSICAL THERAPY MEDICAL/SOCIAL HISTORY  4/23/2025 all goals ongoing.    SUBJECTIVE  \" I am dizzy.\"    OBJECTIVE  Precautions: Bed/chair alarm    WEIGHT BEARING RESTRICTION     PAIN ASSESSMENT   Rating: Unable to  rate  Location: abdomen and anus  Management Techniques: Activity promotion, Breathing techniques, Body mechanics, Relaxation, Repositioning    BALANCE                                                                                                                       Static Sitting: Good  Dynamic Sitting: Fair           Static Standing: Fair  Dynamic Standing: Fair    ACTIVITY TOLERANCE                         O2 WALK       AM-PAC '6-Clicks' INPATIENT SHORT FORM - BASIC MOBILITY  How much difficulty does the patient currently have...  Patient Difficulty: Turning over in bed (including adjusting bedclothes, sheets and blankets)?: None   Patient Difficulty: Sitting down on and standing up from a chair with arms (e.g., wheelchair, bedside commode, etc.): A Little   Patient Difficulty: Moving from lying on back to sitting on the side of the bed?: None   How much help from another person does the patient currently need...   Help from Another: Moving to and from a bed to a chair (including a wheelchair)?: A Little   Help from Another: Need to walk in hospital room?: A Little   Help from Another: Climbing 3-5 steps with a railing?: A Little     AM-PAC Score:  Raw Score: 20   Approx Degree of Impairment: 35.83%   Standardized Score (AM-PAC Scale): 47.67   CMS Modifier (G-Code): CJ    FUNCTIONAL ABILITY STATUS  Gait Assessment   Functional Mobility/Gait Assessment  Gait Assistance: Contact guard assist  Distance (ft): 50x2,10  Assistive Device: Rolling walker  Pattern:  (slow noe)    Skilled Therapy Provided    Bed Mobility:  Rolling: mod ind   Supine<>Sit: mod ind with use of rail via log roll.    Sit<>Supine: NT     Transfer Mobility:  Sit<>Stand: sup   Stand<>Sit: sup   Gait: RW and CGA due to dizziness, chair follow for safety.   Seated rest in between distance mentioned above due to fatigue. Dizziness improved after amb.   Therapist's Comments: RN approved session. Seen on 2nd attempt.   BP 89/59 after stand to  sit initially, presented with dizziness. Remained seated for 6 min and performed exercises. Later, agreed to ambulate with safety of chair follow closely. Dizziness subsided after a few min of gait.       THERAPEUTIC EXERCISES  Lower Extremity Ankle pumps  Hip AB/AD  Heel raises  Heel slides  LAQ     Upper Extremity Elbow flex/ext and OH Reaching     Position Sitting and Supine     Repetitions   12   Sets   1     Patient End of Session: Up in chair, Needs met, Call light within reach, RN aware of session/findings, All patient questions and concerns addressed, Family present    PT Session Time: 30 minutes  Gait Training: 15 minutes  Therapeutic Activity: 5 minutes  Therapeutic Exercise: 8 minutes   Neuromuscular Re-education: 0 minutes

## 2025-04-23 NOTE — PROGRESS NOTES
[facility]  Progress Note    Bruce R Salus Patient Status:  Inpatient    10/31/1950 MRN [id number]   Location [facility] 3NW-A Attending Scarlett Saenz*   Hosp Day # 8 PCP Arlene Bruce MD     Subjective:  Patient had pleurx placed yesterday. He still has abdominal soreness.  Distention is little bit better.    Objective:  Blood pressure 94/57, pulse 96, temperature 98 °F (36.7 °C), temperature source Oral, resp. rate 18, height 5' 11\" (1.803 m), weight 200 lb 13.4 oz (91.1 kg), SpO2 91%.    Gen: lying in chair, in mild distress  HEENT: normocephalic  CV: regular rate  Lungs: CTA   Abd: moderately distended, mild TTP  Extrem: no BLE edema  Skin: No rash  Neuro: AOx3    Labs:   Lab Results   Component Value Date    WBC 10.8 2025    HGB 12.5 2025    HCT 38.7 2025    .0 2025    CREATSERUM 1.28 2025    BUN 22 2025     2025    K 5.2 2025     2025    CO2 23.0 2025     2025    CA 9.6 2025    ALB 3.3 2025    ALKPHO 161 2025    BILT 0.3 2025    TP 5.8 2025    AST 33 2025    ALT 23 2025     Lab Results   Component Value Date    WBC 10.8 2025    HGB 12.5 (L) 2025    HCT 38.7 (L) 2025    .0 (H) 2025    NEPERCENT 72.4 2025    LYPERCENT 10.9 2025    MOPERCENT 13.9 2025    EOPERCENT 0.5 2025    BAPERCENT 0.6 2025    NE 7.82 (H) 2025    LYMABS 1.17 2025    MOABSO 1.50 (H) 2025    EOABSO 0.05 2025    BAABSO 0.06 2025           Assessment and Plan:  Metastatic cholangiocarcinoma  - currently on FOLFIRI  - no current plans for intpt chemo  - follow-up Dr. Cassidy on discharge    Recurrent ascites  - s/p abdominal pleurx     Anemia  - Hgb stable  - cont to monitor    CVC-associated DVT  - cont Xarelto 20mg daily     Ileus  - CT abd/pelvis : possibly adynamic ileus  - GI  consulted     Coagulopathy  - INR elevated  - s/p Vitamin K 5mg PO x1     CoNS bacteremia  - 1/2 BlcX from 4/15 positive  - could be contaminant  - abx per ID    Thank you for allowing me to participate in the care of this patient.    Natalia Diehl MD  [facility]  Department of Oncology and Hematology

## 2025-04-23 NOTE — PROGRESS NOTES
ALert & oriented x4,Irritable at times .With tolerable apin.With generalized weakness.Right abdomen Pleur X drain /dressing d/I,old dry drainage noted on dressing.Abdomen distended ,soft.Calvo catheter intact.Was up in chair earlier this am Test Cancelled: Patient refused collection. To walk at this time.Will work and ambulate with PT this afternoon Plan  of care discussed with patient.All questions and concerns addressed.

## 2025-04-23 NOTE — CM/SW NOTE
Care Progression Note:  Length of stay: 8  GMLOS: 3.6  Avoidable Delays:   Code Status: not on file-Palliative Care APRN discussed with patient today    Acute Medical Issue/Factors:   Urinary retention   Metastatic cholangiocarcinoma  Recurrent ascites s/p Pleurx drain placement  Anemia  Ileus    Discharge Barriers: medical clearance  Expected discharge date: 2-3 days   Expected next site of care: Home with Home Health Care (agency choice pending)    Pleurx drain placed in IR dept yesterday for ascites management per chart review.  Oncologist paged for Pleurx drain management instruction required for  RN follow up.  CM spoke to patient and spouse for update; home health care discussed and choice list reviewed.  Patient stated history with [facility] (not on choice list); CM to follow up with agency for referral.     / available for discharge planning.   Adele Fry RN Case Manager [phone]

## 2025-04-23 NOTE — PROGRESS NOTES
Patient alert x 4, CPAP while asleep. NSR on telemetry, denies any chest pain or shortness of breath. Mild intermittent tremors in hands. IV saline locked after 1L NS completed. SCDs on. Low appetite on regular diet, intermittent nausea but declines medication. Tramadol and norco PRN per MAR. Abdomen rounded and tender near pleurX drain and left quadrants. Pt refuses 2nd enema, RN educated patient, patient continues to decline throughout shift. Passing gas, pt had one medium soft BM after 1st enema. No additional BM overnight. Calvo intact with hazy yellow urine. Ambulating with stand by assist and a walker. Plan of care discussed with patient, all questions addressed. Call light within reach.

## 2025-04-23 NOTE — PROGRESS NOTES
[facility]  Progress Note    Bruce R Salus Patient Status:  Inpatient    10/31/1950 MRN [id number]   Location [facility] 3NW-A Attending Scarlett Saenz*   Hosp Day # 8 PCP Arlene Bruce MD     Subjective:  Bruce R Salus is a(n) 74 year old male.Pt with abd distension NV.  CT ileus Hx cholangio s/p pleurex catheter for ascites  ROS: Has fatigu    Objective:  Blood pressure 104/58, pulse 75, temperature 98.6 °F (37 °C), temperature source Oral, resp. rate 18, height 5' 11\" (1.803 m), weight 200 lb 13.4 oz (91.1 kg), SpO2 94%.  Physical Exam:  GEN: well developed, well nourished, NAD   LUNGS: CTA  CARDIO: RRR  GI: +BS min distension no guarding no rebouind RECTAL: Exam not done. EXTREM.: no edema NEURO: A + O      Labs:   Lab Results   Component Value Date    WBC 10.8 2025    HGB 12.5 2025    HCT 38.7 2025    .0 2025    CREATSERUM 1.28 2025    BUN 22 2025     2025    K 5.2 2025     2025    CO2 23.0 2025     2025    CA 9.6 2025    ALB 3.3 2025    ALKPHO 161 2025    BILT 0.3 2025    TP 5.8 2025    AST 33 2025    ALT 23 2025           Assessment:  Problem List[1]     Bruce R Salus is a(n) 74 year old male. Pt with met cholangio. Ascites sp Pleurex catheter.  Has ileus on CT likely medication immobility procedure related.  No cler obstruction.  Some NV      Plan:    1.  Cont diet.  2.  PT to ambulate pt.    Total time spent on patient care:  20 minutes    Mike Akers MD  2025  10:25 AM         [1]   Patient Active Problem List  Diagnosis    Essential hypertension    KAREN on CPAP    History of kidney stones    Aorto-iliac atherosclerosis    BPH without obstruction/lower urinary tract symptoms    Elevated prostate specific antigen (PSA)    Moderate episode of recurrent major depressive disorder (HCC)    BMI 28.0-28.9,adult    History of nonmelanoma skin  cancer    Urinary retention

## 2025-04-23 NOTE — DISCHARGE INSTRUCTIONS
Sometimes managing your health at home requires assistance.  The [facility] team has recognized your preference to use [facility] . They can be reached by phone at [phone].  A representative from the home health agency will contact you or your family to schedule your first visit.        When transitioning home with hospice or palliative services it is important to have someone to call. Please contact [facility] [phone] after discharge to assist with any questions or concerns.       [facility]: [address], [place] [address] Phone: [phone]       [facility] - Call first  Administrative Offices [facility] only: Wheelchairs, walkers, canes, and bath seats  [address] [place] [address]  [phone]   [email or web address]  No age requirement  Free; Up to 30-day loans      [facility]  Supervisor’s Office (first floor) Monday - Friday   Walkers, canes, wheelchairs,crutches, commodes, bath/shower benches,  [address] 8:30 am - 4:30 [place] [address]   [phone]   No age requirement  Free; Up to 3-month loans.    [employer] Phone: [phone] www.[employer]  [employer] Phone: [phone] www.GPalne.Gamida Cell  [employer] Phone: [phone] www.[email or web address].Gamida Cell  Rescue Alert Phone: [phone] www.rescuealert.Gamida Cell  Response Link Phone: [phone] www.[employer] Sciencelink.Gamida Cell  Alert 1 Phone: [phone] www.[email or web address].Gamida Cell  [employer] Response Phone: [phone] [email or web address].Gamida Cell  [employer] Phone: [phone] www.[employer].Gamida Cell  Medical Home Alert  Phone: [phone] www.Retroficiency.Gamida Cell  Life Alarm Phone: [phone] www.lifeAvadhi Finance and Technologyalarm.Gamida Cell  Direct Link Phone: [phone] www.[employer].Gamida Cell  Vital-Link Phone: [phone] www.vitalFuntigo Corporation.Gamida Cell  Life Assist Phone: [phone] www.[employer].Gamida Cell  [facility] Phone:  [phone] "dot life[employer] ltd.".[employer]  [facility] - Life Care Monitoring[employer] Inc Phone: [phone]      [facility]  [phone]  [email or web address].[email or web address][email or web address]    [facility]    [phone]  [email or web address] CentercoDos[facility] Memorial Hospitalil.org        Caring for Your Catheter    A wilkerson catheter has been placed into your bladder to drain your urine.  A small balloon in the catheter is inflated and keeps the catheter in your bladder.  Proper care of your catheter and the drainage bag can help to prevent infections.    Care of the Catheter:  The catheter should be securely taped to your thigh or abdomen to prevent pulling or increased tension on the urethra.  At least once daily, gently wash the catheter starting where the catheter enters your body and cleanse down the entire length of the catheter. Use a mild soap.  Do not use any astringents or antibacterial soap. It's best done in the shower. Make sure that any encrustations on the catheter are washed away.  Rinse well.   If you are an uncircumcised male, push the foreskin back to clean and after cleaning the catheter push the foreskin back to its normal position  Care of the Drainage Bag       Empty the drainage bag when it is ½ to 1/3 full.  The drainage bag must always be to gravity drainage and must be below the level of your bladder.  The bag should never be left lying on the floor.  Drainage bags should be cleaned each time you switch from a leg bag to a night drainage bag.  If you do not switch from a leg bag to a night drainage system, you should wash your drainage bag 1 x/week.  After disconnecting the tubing from the catheter, pour a solution of 1 part bleach to 10 parts water through the tubing and the bag. Rinse the inside and the outside of the drainage bag with water to remove all the bleach solution so that no injury to your skin will develop.  Empty the drainage bag when it is ½ to 1/3 full.  The drainage bag must always have gravity  drainage and must be below the level of your bladder.  What To Do If You Leak Around the Catheter:  Check the connections between the catheter and the drainage system to make sure they are intact and not the source of the leak.  Make sure the catheter is securely taped and holding the catheter securely and not pulling.  Readjust as necessary.  Make sure that the catheter is gravity drainage.  Call the office. You may be experiencing bladder spasms. You may be asked to come in and have the catheter checked or you may be given a medication that stops the spasms.  Special Instructions:  Increase your fluid intake so that your urine output is between 1500 and 2000cc’s/day.  Prevent constipation.  Increase daily fiber or use Miralax as needed.  Contact Our Office If:  If you see excessive blood or clots in your urine  If you have a temperature that is greater than 101.  If you don’t see any urine in your drainage bag after 3-4 hours.  Check the position of the drainage bag first and also make sure the catheter isn’t kinked.  If you have any burning, pain, purulent drainage or bleeding from around the  catheter site.  If you have persistent leaking around the catheter.

## 2025-04-23 NOTE — CONSULTS
[facility]   part of [facility]  Palliative Care Initial Consult Note    Bruce R Salus Patient Status:  Inpatient    10/31/1950 MRN [id number]   Location [facility] 3NW-A Attending Scarlett Saenz*   Hosp Day # 8 PCP Arlene Bruce MD     Date of Consult: 2025  Patient seen at: [facility] Inpatient    Reason for Consultation: Consult ordered by:: Dr. Saenz for evaluation of Palliative Care needs and Goals of care discussion;Establish palliative care    Subjective     History of Present Illness: Bruce R Salus is a 74 year old male with history of cholangiocarcinoma with peritoneal mets and recent recurrent cholangitis (recent progression, last chemo 25), prostate cancer, BPH, DVT (catheter associated on Xarelto), KAREN, HTN, HLD, pneumonia, hemorrhoids who was admitted on 2025 for urinary retention and abdominal distention. Work up in our hospital included cystoscopy wot complicated placement of wilkerson catheter, paracentesis x2 with placement of Pleurx catheter 25, CT A/P with adynamic ileus (no obstruction per GI).  History was obtained from [facility], the patient and his wife.      Today is day #8 of hospitalization.     When I entered the room, the patient was awake & alert and lying in bed.  Wife Macie  present at bedside.      Review of Systems:   Symptoms(s): Anorexia, Pain, Constipation    Pain assessment: (limited due to patient preference)  24 hour (1287-0360) OME total: 29mg  (Morphine 4mg IV x 2= 8mg + Norco 5mg x1) + Tramadol 50 x1 (no OME equivalent)  Current pain: didn't rate, described as pressure, located \"pelvic line and unbilicus line\"      Dyspnea: none noted  Cough: none noted  Nausea: intermittent  Appetite: fair/poor due to constipation  Pain: as above  Constipation: just recently  Last BM:   Bowel Movement         2025  1738             Stool Count Calculated for I/O: 1            Palliative Care Social History:    Marital Status:   Children: Yes, daughter and son  Living Situation Prior to Admit: Home  Does Patient Live Alone: No, lives with spouse  Is Patient Confused: No  Occupational History: Working \"semi-retired\" in [employer] business    Substance History:   reports that he quit smoking about 44 years ago. His smoking use included cigarettes. He started smoking about 52 years ago. He has a 1.6 pack-year smoking history. He has never used smokeless tobacco.  reports that he does not currently use alcohol after a past usage of about 0.8 standard drinks of alcohol per week.  reports that he does not currently use drugs after having used the following drugs: Cannabis.    Spiritual Assessment:   Oriental orthodox - Parish Not Listed  Spouse shared they have a strong odette and belief in the power of prayer. Inquired if Reilly would feel supported by a visit from a Spiritual leader? She shared he may take it as a sign that \"he was at the end\" and declined. Informed if there was a change to let the care team know.     Past Medical History/Past Surgical History:   This is the 1st hospitalization in the past 6 months.    Medical History: obtained from [facility]  Past Medical History[1]  Past Surgical History[2]    Family History: obtained from [facility]  Family History[3]    Allergies:  Allergies[4]    Medications:   Current Hospital Medications[5]    Functional Status History:  ADLs: bathing or showering, dressing, getting in and out of bed or a chair, walking, using the toilet, and eating  - was independent PTA  IADLs: use the phone, shop for groceries, meal preparation, manage medicines, clean living area, use transportation by self, manage money  - was independent PTA  DME: None  Recurrent falls: No    Palliative Performance Scale:   Prior to admission: (pt/family reported) 90 %  Observed during hospitalization: 50 %  % Ambulation Activity Level Self-Care Intake Consciousness   100 Full  Normal  No Disease Full Normal Full   90 Full   Normal  Some Disease Full Normal Full   80 Full  Normal w/effort  Some Disease Full Normal or reduced Full   70 Reduced  Can't Perform Job  Some Disease Full Normal or reduced Full   60 Reduced  Can't Perform Hobby   Significant Disease Occ Assist Normal or reduced Full or confused   50 Mainly sit/lie Can't do any work  Extensive Disease Partial Assist Normal or reduced Full or confused   40 Mainly in bed Can't do any work  Extensive Disease Mainly Assist Normal or reduced Full or confused   30 Bed Bound Can't do any work  Extensive Disease Max Assist  Total Care Reduced  Drowsy/confused   20 Bed Bound Can't do any work  Extensive Disease Max Assist  Total Care Minimal  Drowsy/confused   10 Bed Bound Can't do any work  Extensive Disease Max Assist  Total Care Mouth Care  Drowsy/confused   0 Death        Objective      Vital Signs:  Blood pressure 118/63, pulse 79, temperature 98 °F (36.7 °C), temperature source Oral, resp. rate 18, height 5' 11\" (1.803 m), weight 200 lb 13.4 oz (91.1 kg), SpO2 94%.  Body mass index is 28.01 kg/m².    Physical Exam:  General: Alert & Awake. In no apparent respiratory distress. Body habitus BMI WNL   HEENT: AT/NC. No gross focal deficits. MMM   Cardiac:  NSR per monitor  Lungs: Normal effort on RA  Neurologic: Alert and oriented to person, place, time, and situation   Psychiatric: Mood  frustrated      Hematology:  Lab Results   Component Value Date    WBC 10.8 04/23/2025    HGB 12.5 (L) 04/23/2025    HCT 38.7 (L) 04/23/2025    .0 (H) 04/23/2025       Coags:  Lab Results   Component Value Date    INR 1.43 (H) 04/22/2025    PTT 47.1 (H) 04/14/2025       Chemistry:  Lab Results   Component Value Date    CREATSERUM 1.28 04/23/2025    BUN 22 04/23/2025     (L) 04/23/2025    K 5.2 (H) 04/23/2025     04/23/2025    CO2 23.0 04/23/2025     (H) 04/23/2025    CA 9.6 04/23/2025    ALB 3.3 04/23/2025    ALKPHO 161 (H) 04/23/2025    BILT 0.3 04/23/2025    TP 5.8  04/23/2025    AST 33 04/23/2025    ALT 23 04/23/2025    PSA 5.77 (H) 04/07/2022    MG 1.9 04/20/2025       Imaging:  CT ABDOMEN+PELVIS(CONTRAST ONLY)(CPT=74177)  Result Date: 4/22/2025  CONCLUSION:  1. There is fluid distension of small bowel with air and stool distension of colon.  This may reflect adynamic ileus. 2. Interval placement of a PleurX catheter.  Marked decrease in ascites.  Omental changes consistent with carcinomatosis appear similar to the prior. 3. Resolution of small right pleural effusion.  Decrease in small left pleural effusion.  Stable basilar airspace disease.  This may be atelectasis.  The potential for pneumonia should be correlated with clinical suspicion for infection. 4. Details as above.  Continued clinical correlation recommended.    LOCATION:  [facility]   Dictated by (CST): Adán Momin MD on 4/22/2025 at 3:42 PM     Finalized by (CST): Adán Momin MD on 4/22/2025 at 3:53 PM       IR PLEUREX CATHETER  Result Date: 4/22/2025  CONCLUSION: Successful placement of abdominal Pleurx catheter.   LOCATION:  [facility]       Dictated by (CST): Smooth Quezada MD on 4/22/2025 at 8:48 AM     Finalized by (CST): Smooth Quezada MD on 4/22/2025 at 8:49 AM         Summary of Discussion      I discussed reason for palliative care consultation with Patient and Spouse    I differentiated the palliative treatment-focus model versus the hospice philosophy of care. I informed the patient/family that having palliative care support does not limit medical treatment options or decisions to those who wish to continue curative or restorative medical therapies. I discussed the benefits of palliative care to include assistance with arising symptom management needs, an extra layer of support, to ensure GOC are respected throughout healthcare continuum.    Outpatient/Community Palliative Care Services:  Usually visit once per 4 weeks  Focus on GOC and symptom management   Palliative Care criteria:  Not altered by prognosis    Does not limit curative or restorative therapies      Prognostic awareness/understanding: Remains in the information gathering phase.  Patient was able to share/explain in detail the hx of his cancer journey leading up to this hospitalization. He shared that he was doing very well \"totally independent, tolerated prior treatments without problem\" and they were planning a trip to [place] (supposed to leave this weekend). Recent scans showed disease progression so oncology recommended \"a couple rounds\" of new chemo prior to their trip. He had completed only 1 cycle on 4/2 (2nd was due 4/16). He is aware the treatment was \"palliative\" vs curative.   Macie shared Reilly was told at onset of his dx that there was a 2% chance he would live 2 years. He has made it past the 2 years and they were confident he would be in the \"2% that would survive longer\".   He is aware of his current clinical condition and that in his current state he would not be able to continue with his prior tx regimen but appears willing to continue if able.   Appreciate oncology discussion related to prognosis in light of current clinical condition to allow patient and family to have realistic GOC discussions.     Hopes/goals:   Limited discussion with Reilly per his request. Continued discussion with Macie outside room. She remains hopeful and in \"the power of prayer\" but both appear aware of the impact with the new disease progression and acute medical interventions during the hospital stay to his overall level of function.     Fears/concerns:   Concern about ascites and need for pleurx requiring frequent draining.   Concern about \"bowels shutting down\" and refractory constipation  Concern about inability to continue cancer directed treatment (with associated implications)  Macie expressed that Reilly has always wanted to be in control of his treatment and she has allowed him to \"take charge\" but she is concerned about his recent refusal to mobilize  and participate in therapies which she knows are key to improving his strength, function and ability to go home.   Provided emotional support to Patient and Spouse.    Advance Care Planning counseling and discussion:   HCPOA:  Requested for file   Healthcare Agent Appointed: Yes  Healthcare Agent's Name: Helene Salus - spouse     A voluntary discussion of the risks vs benefits of life sustaining treatments in the setting of advanced age and metastatic cancer with disease progression was had with Macie. She was not aware that the discussion had been approached with Reilly. I discussed that current implied code status was Full Code. We discussed that in his current state of advanced disease DNAR/Selective Treatment would be appropriate and she agreed she would not want to have him \"come back to more suffering\". We discussed making a decision before there was a crisis. She shared it would be best for her to have this discussion with him when the timing was better.   Code Status:  No Order  POLST: Macie informed POLST would be completed prior to discharge if DNAR was requested      Problem List:  Principal Problem:    Urinary retention  Active Problems:    Palliative care encounter    Goals of care, counseling/discussion      Assessment and Recommendations      Goals of care: ongoing   Limited discussion with Reilly per his request. Continued discussion with Macie outside room. She remains hopeful and in \"the power of prayer\" but both appear aware of the impact with the new disease progression and acute medical interventions during the hospital stay to his overall level of function.   Appreciate oncology discussion related to updated prognosis in light of current clinical condition to allow patient and family to have realistic GOC discussions.   Concern about ascites and need for pleurx requiring frequent draining.   Concern about \"bowels shutting down\" and refractory constipation  Concern about inability to continue  cancer directed treatment (with associated implications)  Macie expressed that Reilly has always wanted to be in control of his treatment and she has allowed him to \"take charge\" but she is concerned about his recent refusal to mobilize and participate in therapies which she knows are key to improving his strength, function and ability to go home.   Advanced Care Planning:  Code Status: No Order presumed full code, see above discussion. Spouse to discuss with Reilly in light of disease progression and acute clinical course.   HCPOA: Requested for filing, Healthcare Agent's Name: Helene Salus - spouse  Symptoms; Pain/per primary service  Consider limiting to one PO analgesic for moderate pain. Message to Dr. Saenz, will stop Tramadol.   Recommend limiting opioids due to ileus, encourage ambulation, liquids  Bowel regime per GI/hospitalist as ordered.     Discussed today's visit with  RN, SW/CM, and hospitalist.    Palliative Care Follow Up: Palliative care team will follow peripherally per patient and spouse request. They will call if/when ready to continue with GOC/POC discussions/support. Please contact provider with any questions, concerns  Palliative care follow up at discharge: is indicated, spouse aware Community or OP palliative care can be ordered. She will let the team know if/when they decide.     Thank you for allowing Palliative Care services to participate in the care of Bruce R Salus.    A total of 55 minutes were spent on this consult, which included all of the following: chart review, direct face to face contact, history taking, physical examination, counseling and coordinating care, and documentation including at least 16 minutes specifically related to Advanced Care Planning (discussion or form completion).         Colleen Polk, VIDHYA  4/23/2025  12:10 PM  Palliative Care Services    The 21st Century Cures Act makes medical notes like these available to patients in the interest of transparency. Please  be advised this is a medical document. Medical documents are intended to carry relevant information, facts as evident, and the clinical opinion of the practitioner. The medical note is intended as peer to peer communication and may appear blunt or direct. It is written in medical language and may contain abbreviations or verbiage that are unfamiliar.          [1]   Past Medical History:   Anxiety state    BPH (benign prostatic hyperplasia)    Calculus of kidney    Cancer (HCC)    bile duct cancer    Chemotherapy-induced nausea    Cholangiocarcinoma (HCC)    Deep vein thrombosis (HCC)    port cath had clot    Depression    Esophageal reflux    High blood pressure    High cholesterol    History of recent hospitalization    10 days per pt at [facility]- had high fever, found sepsis - treated w antibiotics/ no fever now    Hypercholesterolemia    HYPERTENSION    Hypertension    Kidney disease    LFT elevation    Metastasis to peritoneal cavity (HCC)    Moderate episode of recurrent major depressive disorder (HCC)    KAREN (obstructive sleep apnea)    mild, AHI 10, O2 jas 84%, AutoPAP 8-20    Other and unspecified hyperlipidemia    OTHER DISEASES    pneumonia after flu    Personal history of antineoplastic chemotherapy    last infusion.   JANUARY 22TH 2025    Pneumonia    Pneumonia due to organism    12 YEARS AGO    Reflux    Sleep apnea    cpap    Unspecified essential hypertension    Visual impairment    GLASSES   [2]   Past Surgical History:  Procedure Laterality Date    Cholecystectomy      Closed rx nose fx w stabilizatn  03/21/2013    Procedure: CLOSED REDUCTION NASAL FRACTURE;  Surgeon: Lianna Luna MD;  Location: [facility]    Colonoscopy N/A 08/24/2021    Procedure: COLONOSCOPY, with polypectomy;  Surgeon: Gaurav Cruz MD;  Location: [facility]    Colonoscopy & polypectomy  10/07/2015    a small polyp was removed; ASC    Colonoscopy,remv lesn,snare N/A 10/07/2015    Procedure:  COLONOSCOPY, POSSIBLE BIOPSY, POSSIBLE POLYPECTOMY 69843;  Surgeon: Gaurav Cruz MD;  Location: [facility]    Cystoscopy,insert ureteral stent  04/08/2013    Procedure: CYSTOSCOPY/URETEROSCOPY/STONE EXTRACTION;  Surgeon: Eduin Luna MD;  Location: [facility]    Cystouretero w/lithotripsy  04/08/2013    Procedure: LITHOTRIPSY HOLMIUM LASER WITH CYSTOSCOPY;  Surgeon: Eduin Luna MD;  Location: [facility]    Cystourethroscopy      Cystouretro w/stone remove  04/08/2013    Procedure: CYSTO, WITH INSERTION OF STENT;  Surgeon: Eduin Luna MD;  Location: [facility]    Ercp,diagnostic  10/2024    Other surgical history      KIDNEY STONE REMOVED     Other surgical history      excision of left renal cyst     Other surgical history  11/15/2012    Flow US, RBUS    Other surgical history  12/05/2013    flow us- DR. Wilson    Other surgical history  2016    Cystoscopy    Skin surgery  09/15/2020    MMS-SCC-Left ear-Dr. RACHEL Hull     X-ray retrograde pyelogram  04/08/2013    Procedure: CYSTOSCOPY/URETEROSCOPY/STONE EXTRACTION;  Surgeon: Eduin Luna MD;  Location: [facility]   [3]   Family History  Problem Relation Age of Onset    Other (Other) Father         sepsis    Cancer Mother         lymphoma    Cancer Sister         thyroid CA   [4]   Allergies  Allergen Reactions    Chlorhexidine RASH     Clarissa body wipes   [5]   Current Facility-Administered Medications:     fleet enema (Fleet) rectal enema 133 mL, 1 enema, Rectal, Once    sodium chloride 0.9 % IV bolus 500 mL, 500 mL, Intravenous, Once    polyethylene glycol (PEG 3350) (Miralax) 17 g oral packet 17 g, 17 g, Oral, BID    methylnaltrexone (Relistor) 12 mg/0.6mL SUBQ injection 6 mg, 6 mg, Subcutaneous, Q48H    ondansetron (Zofran) 4 MG/2ML injection 4 mg, 4 mg, Intravenous, Q6H PRN    simethicone (Mylicon) chewable tab 80 mg, 80 mg, Oral, QID PRN    morphINE PF 2 MG/ML injection 1 mg, 1 mg,  Intravenous, Q2H PRN **OR** morphINE PF 2 MG/ML injection 2 mg, 2 mg, Intravenous, Q2H PRN **OR** morphINE PF 4 MG/ML injection 4 mg, 4 mg, Intravenous, Q2H PRN    tiZANidine (Zanaflex) partial tab 1 mg, 1 mg, Oral, Q6H PRN    traMADol (Ultram) tab 50 mg, 50 mg, Oral, Q6H PRN    HYDROcodone-acetaminophen (Norco) 5-325 MG per tab 1 tablet, 1 tablet, Oral, Q6H PRN    docusate sodium (Colace) cap 100 mg, 100 mg, Oral, BID    polyethylene glycol (PEG 3350) (Miralax) 17 g oral packet 17 g, 17 g, Oral, Daily PRN    bisacodyl (Dulcolax) 10 MG rectal suppository 10 mg, 10 mg, Rectal, Daily PRN    rivaroxaban (Xarelto) tab 20 mg, 20 mg, Oral, Daily with food    lisinopril (Prinivil; Zestril) tab 20 mg, 20 mg, Oral, Daily    acetaminophen (Tylenol Extra Strength) tab 500-1,000 mg, 500-1,000 mg, Oral, Q4H PRN    pantoprazole (Protonix) DR tab 20 mg, 20 mg, Oral, QAM AC    tamsulosin (Flomax) cap 0.4 mg, 0.4 mg, Oral, BID    atorvastatin (Lipitor) tab 10 mg, 10 mg, Oral, Nightly    hydrocortisone (Anusol-HC) 2.5 % rectal cream, , Rectal, BID PRN    venlafaxine ER (Effexor-XR) 24 hr cap 37.5 mg, 37.5 mg, Oral, Nightly

## 2025-04-24 LAB — CANCER AG19-9 SERPL-ACNC: 1568.5 U/ML (ref ?–35)

## 2025-04-24 RX ORDER — PANTOPRAZOLE SODIUM 40 MG/1
40 TABLET, DELAYED RELEASE ORAL
Status: DISCONTINUED | OUTPATIENT
Start: 2025-04-24 | End: 2025-04-28

## 2025-04-24 RX ORDER — ZOLPIDEM TARTRATE 10 MG/1
10 TABLET ORAL NIGHTLY PRN
Status: DISCONTINUED | OUTPATIENT
Start: 2025-04-24 | End: 2025-04-28

## 2025-04-24 RX ORDER — OXYCODONE AND ACETAMINOPHEN 5; 325 MG/1; MG/1
1 TABLET ORAL EVERY 4 HOURS PRN
Refills: 0 | Status: DISCONTINUED | OUTPATIENT
Start: 2025-04-24 | End: 2025-04-27

## 2025-04-24 RX ORDER — OXYCODONE AND ACETAMINOPHEN 5; 325 MG/1; MG/1
2 TABLET ORAL EVERY 4 HOURS PRN
Refills: 0 | Status: DISCONTINUED | OUTPATIENT
Start: 2025-04-24 | End: 2025-04-27

## 2025-04-24 NOTE — PROGRESS NOTES
Patient alert and oriented x4, pain controlled with PRN medications per MAR. Tolerating regular diet, has poor appetite. Calvo catheter in place draining clear, yellow urine. Ambulatory for short distance. Pleurx cath dressing site, with old drainage. Skin check with Eula, PCT - no new concerns.

## 2025-04-24 NOTE — PROGRESS NOTES
DM Hospitalist Progress Note     PCP: Arlene Bruce MD    Chief Complaint: follow-up   Follow up for: The primary encounter diagnosis was Urinary retention. Diagnoses of Urine retention, Anemia, unspecified type, Bright red blood per rectum, and Cholangiocarcinoma (HCC) were also pertinent to this visit.    Overnight/Interim Events:      SUBJECTIVE:  Patient seen and examined.  Long conversation with patient, wife and daughter.   Reviewed plan, plan for nutrition evaluation.  Encouraged ambulation.   Passed BM after enema yesterday.  Denies CP/SOB.  NAD.       OBJECTIVE:  Temp:  [97.9 °F (36.6 °C)-98.5 °F (36.9 °C)] 98 °F (36.7 °C)  Pulse:  [71-98] 84  Resp:  [18-20] 20  BP: ()/(53-63) 93/53  SpO2:  [93 %-100 %] 93 %    Intake/Output:    Intake/Output Summary (Last 24 hours) at 4/24/2025 1358  Last data filed at 4/24/2025 0515  Gross per 24 hour   Intake 860 ml   Output 350 ml   Net 510 ml       Last 3 Weights   04/17/25 0751 200 lb 13.4 oz (91.1 kg)   04/16/25 0622 200 lb 14.4 oz (91.1 kg)   04/14/25 0636 200 lb (90.7 kg)   04/14/25 0625 200 lb (90.7 kg)   03/24/25 0803 202 lb (91.6 kg)   03/11/25 1712 202 lb (91.6 kg)   02/24/25 1041 205 lb (93 kg)   02/12/25 1050 205 lb (93 kg)       Exam    General: Alert, no distress, appears stated age.     Head:  Normocephalic, without obvious abnormality, atraumatic.   Eyes:  Sclera anicteric, EOMs intact.    Nose: Nares normal,  Mucosa normal    Throat: Lips normal   Neck: Supple, symmetrical, trachea midline   Lungs:   Clear to auscultation bilaterally. Normal effort   Chest wall:  No tenderness or deformity   Heart:  Regular rate and rhythm, S1, S2 normal, no murmur, rub or gallop appreciated   Abdomen:   Soft, some +tympanic to percussion, NT/ND, Bowel sounds normal. No masses,  No organomegaly.      Extremities: Extremities normal, atraumatic, no cyanosis or LE edema.   Skin: Skin color, texture, turgor normal. No rashes or lesions.    Neurologic:  Moving all extremities spontaneously, no focal deficit appreciated      Data Review:       Labs:     Recent Labs   Lab 04/17/25  1516 04/17/25  1517 04/20/25  0513 04/21/25  0830 04/22/25  0538 04/23/25  0638   WBC  --  5.1 6.7  --  7.6 10.8   HGB  --  9.8* 11.0*  --  11.4* 12.5*   MCV  --  86.2 84.0  --  85.8 84.9   PLT  --  336.0 449.0  --  516.0* 581.0*   INR 2.94*  --   --  1.74* 1.43*  --        Recent Labs   Lab 04/17/25  1516 04/19/25  0606 04/20/25  0513 04/22/25  0538 04/23/25  0638     --  139 138 134*   K 4.4  --  4.5 4.5 5.2*     --  107 106 102   CO2 25.0  --  24.0 25.0 23.0   BUN 20  --  18 21 22   CREATSERUM 0.97  --  1.18 1.11 1.28   CA 9.2  --  9.6 9.5 9.6   MG 1.7 1.6 1.9  --   --    *  --  138* 123* 125*       Recent Labs   Lab 04/23/25  0638   ALT 23   AST 33   ALB 3.3         No results for input(s): \"PGLU\" in the last 168 hours.    No results for input(s): \"TROP\" in the last 168 hours.      Meds:     Scheduled Medications[1]  Medication Infusions[2]  PRN Medications[3]       Assessment/Plan:     74 year old male with PMH including but not limited to HTN, HL, Cholangiocarcinoma, KAREN, GERD who p/t [facility] ED c urinary retention.      Cholangiocarcinoma with peritoneal mets and recent recurrent cholangitis secondary to common hepatic duct stricture extending to right main hepatic duct with placement of 10 fr x 12 cm plastic biliary stent on 3/24/25  Ascites   brbpr.   - GI following, apprec  - ultrasound abd c mod degress ascites  - CMP mostly ok  - s/p para 4/15; f/u studies; cytology pending   - WBCs 1083, 54% N, empiric CTX started 4/15 plan for 5d course per GI, EOT 4/20  - resumed xarelto (held for pleurex placement   - proctozone cream BID PRN for bleeding   - onc c/s   - sent blood cxs for completeness; one set c GPC in clusters, assume contaminant, re-sent blood cxs, NG x1d   - d/w MD Angel and MD Milvia, ascities has re-accumulated, repeat para; will need to set up o/p  frances v pleurex   - s/p IR pleurX placement 4/22, 4L removed  - CT a/p to assess progression reviewed c pt, omental carcinomatosis.   - PCT 0.25  - Bloating, needed lactulose and enema to finally have BM 4/19 PM  - encouraged ambulation, BID colace, relistor to help with bowel motility   - CT A/P 4/22 with adynamic ileus  - aggressive bowel regimen     # Urinary retention  - d/w RAFAELA Carreon and Dr. Luna  - s/p cystoscopy, (complicated) placement of wilkerson catheter (over wire) 4/14  - Ucx NG <18hrs  - follow PVR  - flomax  -Continue with wilkerson catheter to gravity drainage.    - F/U with urology in 1 month for catheter removal or exchange.     Anemia, chronic  - chronic disease, malignancy   - monitor      # HL  -statin     # GERD  -PPI, wean as o/p if appropriate     # Major Depression/ Generalized anxiety d/o   -reviewed home meds, continue SSRI currently appears stable      # HTN  - resumed lisinopril     # Proph  - scds    04/23/25 - Advanced care planning - 16 minutes were spent discussing advanced care planning exclusive of the documented time for this visit.  D/w patient, wife and daughter Sumaya over the phone. Remains full code, agreeable to meeting with palliative care. Understands that his condition may be worsening/progressing.      Dispo: med onc, plan for SD home tomorrow      Jose Saenz MD  [facility]  Hospitalist  Message over Concept Inbox/PerBlue/Rukuku  Pager: [phone]     A total 75 minutes of prolonged care delivered including extensive chart review and discussion with multiple specialists exclusive of the documented time for this visit.       Hospital Bed: Bruce R Salus requires a semi-electric hospital bed at home due to physical condition in which they are not able to turn self and other Bruce R Salus has a medical condition which requires positioning of the body in ways not feasible with an ordinary bed. An order for a hospital bed has been placed due to a need to elevate pain  in which they are not able to turn self and requires frequent and/or immediate changes in body positions. Bruce R Salus requires frequent changes in body position and/or has an immediate need for a change in body position.     Wheelchair: Bruce R Salus requires a transport wheelchair to complete mobility related ADL's within the home. Bruce R Salus is unable to complete the majority of ADL's with a cane or walker.  Bruce R Salus has a caregiver to propel him in a transport wheel chair. Patient has adequate space within their home to safely use the wheelchair. The patient has not expressed an unwillingness to use the wheelchair and patient can't use cane or walker to resolve mobility limitation.The wheelchair can be used safely in the home for [facility]'s     Rollator: A Rollator has ordered to assist Bruce R Salus with activities of daily living. Bruce R Salus presents with the following impairments decreased strength, functional mobility, trunk control, endurance, balance due to increased weakness and mobility impairments. It is in my opinion a Rollator walker will assist patient in activities of daily living. The patient is able to safely use the rollator walker The patient's functional mobility deficit can be sufficiently resolved with the use of a Rollator walker          [1]    pantoprazole  40 mg Oral BID AC    polyethylene glycol (PEG 3350)  17 g Oral BID    methylnaltrexone  6 mg Subcutaneous Q48H    docusate sodium  100 mg Oral BID    rivaroxaban  20 mg Oral Daily with food    lisinopril  20 mg Oral Daily    tamsulosin  0.4 mg Oral BID    atorvastatin  10 mg Oral Nightly    venlafaxine ER  37.5 mg Oral Nightly   [2] [3]   zolpidem    oxyCODONE-acetaminophen **OR** oxyCODONE-acetaminophen    calcium carbonate    ondansetron    simethicone    morphINE **OR** morphINE **OR** [DISCONTINUED] morphINE    tiZANidine    polyethylene glycol (PEG 3350)    bisacodyl    acetaminophen    hydrocortisone

## 2025-04-24 NOTE — PROGRESS NOTES
[facility]  Progress Note    Bruce R Salus Patient Status:  Inpatient    10/31/1950 MRN [id number]   Location [facility] 3NW-A Attending Scarlett Saenz*   Hosp Day # 9 PCP Arlene Bruce MD     Subjective:  Patient still having trouble moving his bowels.  GI was consulted yesterday at the recommended ambulation.  His daughter is at bedside.    Objective:  Blood pressure 103/63, pulse 80, temperature 97.9 °F (36.6 °C), temperature source Oral, resp. rate 18, height 5' 11\" (1.803 m), weight 200 lb 13.4 oz (91.1 kg), SpO2 100%.    Gen: lying in bed, appears fatigued  HEENT: normocephalic  CV: regular rate  Lungs: CTA   Abd: moderately distended, mild TTP  Extrem: no BLE edema  Skin: No rash  Neuro: AOx3    Labs:        Lab Results   Component Value Date    WBC 10.8 2025    HGB 12.5 (L) 2025    HCT 38.7 (L) 2025    .0 (H) 2025    NEPERCENT 72.4 2025    LYPERCENT 10.9 2025    MOPERCENT 13.9 2025    EOPERCENT 0.5 2025    BAPERCENT 0.6 2025    NE 7.82 (H) 2025    LYMABS 1.17 2025    MOABSO 1.50 (H) 2025    EOABSO 0.05 2025    BAABSO 0.06 2025           Assessment and Plan:  Metastatic cholangiocarcinoma  - currently on FOLFIRI  - no current plans for intpt chemo  - had extensive d/w pt and daughter that at this time we will try to get him improved from his acute issues, so that he can follow-up with Dr. Cassidy for cancer management  - follow-up Dr. Cassidy on discharge    Recurrent ascites  - s/p abdominal pleurx     Anemia  - Hgb stable  - cont to monitor    CVC-associated DVT  - cont Xarelto 20mg daily     Ileus  - CT abd/pelvis : possibly adynamic ileus  - GI consulted - recommend PT  - advised pt and daughter to discuss bowel regimen with GI  - pain mgmt per primary    Coagulopathy  - INR elevated  - s/p Vitamin K 5mg PO x1     CoNS bacteremia  - 1/2 BlcX from 4/15 positive  - could be  contaminant  - abx per ID    Thank you for allowing me to participate in the care of this patient.    Natalia Diehl MD  [facility]  Department of Oncology and Hematology

## 2025-04-24 NOTE — CM/SW NOTE
04/24/25 1200   CM/SW Referral Data   Referral Source Social Work (self-referral)   Reason for Referral Discharge planning   Informant Patient;Spouse/Significant Other;Daughter   Medical Hx   Does patient have an established PCP? Yes   Patient Info   Patient's Current Mental Status at Time of Assessment Alert;Oriented   Patient's Home Environment House   Patient lives with Spouse/Significant other   Discharge Needs   Anticipated D/C needs Home health care;Medical equipment   Choice of Post-Acute Provider   Informed patient of right to choose their preferred provider Yes   List of appropriate post-acute services provided to patient/family with quality data No - Declined list   Patient/family choice [facility] completed chart review. Case discussed with RN. Discussed with RN needing clarification for home Pleurx drain management.     ROBERT met with pt and family to discuss DC planning.    Confirmed with family preference for [facility]. Awaiting response from [facility], marked available in [facility]. Pleurx form in chart and [facility]. Family requesting hospital bed and transport wheelchair for DC. Order for commode already sent to HME. New orders uploaded to [facility], need verbiage for hospital bed, and transport W/C. ROBERT sent message to hospitalitist.    Family discussed concerns and experience of care. SW to provide family with number for patient experience.    Awaiting response from [facility], and verbiage for DME.    Addendum 1330:  ROBERT confirmed [facility] can accept. Report they will reach out to pt/family. ROBERT met with family to update, report anticipating DC for tomorrow. SW updated HH and HME of DC tomorrow. SW provided patient experience number to family.    Hospital Bed:   Bruce R Salus requires a semi-electric hospital bed at home due to physical condition in which they are not able to turn self and other Bruce R Salus has a medical condition which requires positioning of the body in ways not feasible with an ordinary bed.  An order for a hospital bed has been placed due to a need to elevate pain in which they are not able to turn self and requires frequent and/or immediate changes in body positions. Bruce R Salus  requires frequent changes in body position and/or has an immediate need for a change in body position.     Wheelchair:   Bruce R Salus requires a transport wheelchair to complete mobility related ADL's within the home. Bruce R Salus is unable to complete the majority of ADL's with a cane or walker.  Bruce R Salus has a caregiver to propel him in a transport wheel chair. Patient has adequate space within their home to safely use the wheelchair. The patient has not expressed an unwillingness to use the wheelchair and patient can't use cane or walker to resolve mobility limitation.The wheelchair can be [facility]    Rollator:   A Rollator has ordered to assist Bruce R Salus with activities of daily living. Bruce R Salus presents with the following impairments decreased strength, functional mobility, trunk control, endurance, balance due to increased weakness and mobility impairments. It is in my opinion a Rollator walker will assist patient in activities of daily living. The patient is able to safely use the rollator walker The patient's functional mobility deficit can be sufficiently resolved with the use of a Rollator walker    SW/CM to remain available for dc planning, and/or additional need for support.    Gabriel CRUZ, HERO  Discharge Planner  [phone]

## 2025-04-24 NOTE — PLAN OF CARE
Problem: Patient/Family Goals  Goal: Patient/Family Long Term Goal  Description: Patient's Long Term Goal: Discharge Home  Interventions: Pain Tolerance, Diet Tolerance, Return to normal ADL's, encourage ambulation  See additional Care Plan goals for specific interventions  Outcome: Progressing  Goal: Patient/Family Short Term Goal  Description: Patient's Short Term Goal: Prepare to Discharge Home  Interventions: Transition from IV to oral pain medications, Advance diet as tolerated, encourage ambulation,   See additional Care Plan goals for specific interventions  Outcome: Progressing     Problem: PAIN - ADULT  Goal: Verbalizes/displays adequate comfort level or patient's stated pain goal  Description: INTERVENTIONS:- Encourage pt to monitor pain and request assistance- Assess pain using appropriate pain scale- Administer analgesics based on type and severity of pain and evaluate response- Implement non-pharmacological measures as appropriate and evaluate response- Consider cultural and social influences on pain and pain management- Manage/alleviate anxiety- Utilize distraction and/or relaxation techniques- Monitor for opioid side effects- Notify MD/LIP if interventions unsuccessful or patient reports new pain- Anticipate increased pain with activity and pre-medicate as appropriate  Outcome: Progressing     Problem: RISK FOR INFECTION - ADULT  Goal: Absence of fever/infection during anticipated neutropenic period  Description: INTERVENTIONS- Monitor WBC- Administer growth factors as ordered- Implement neutropenic guidelines  Outcome: Progressing     Problem: SAFETY ADULT - FALL  Goal: Free from fall injury  Description: INTERVENTIONS:- Assess pt frequently for physical needs- Identify cognitive and physical deficits and behaviors that affect risk of falls.- Chaplin fall precautions as indicated by assessment.- Educate pt/family on patient safety including physical limitations- Instruct pt to call for assistance  with activity based on assessment- Modify environment to reduce risk of injury- Provide assistive devices as appropriate- Consider OT/PT consult to assist with strengthening/mobility- Encourage toileting schedule  Outcome: Progressing   Alert and orient x 4 , on room air , vitals stable ,  abdomen very distended , tender . Poor appetite , pleur vac  on rt lower abdomen , dressing with gauze and Tegaderm , blister noted under the dressing , intact , old drainage on the dressing noted . Very weak , poor appetite . Family at bedside . Plan of care discussed  answered all questions . Verbalized understanding . Will continue to monitor

## 2025-04-24 NOTE — DIETARY NOTE
[facility]   part of [facility]    NUTRITION ASSESSMENT    Pt does not meet malnutrition criteria at this time.      NUTRITION INTERVENTION:    RD nutrition Care Plan- Encouraged increased PO intake, Encouraged small frequent meals with emphasis on high calorie/high protein, and Initiated ONS (oral nutritional supplements)  Meal and Snacks - Monitor and encourage adequate PO intake.   Medical Food Supplements - Ensure Plus High Protein Daily and Magic Cup Daily. Rationale/use for oral supplements discussed.  Nutrition Education - Discussed diet recommendations for GERD. Pt/family receptive to education, no barriers noted. Handouts provided.       PATIENT STATUS: 04/24/25 Chart reviewed d/t consult for poor appetite, nutrition questions. 73 y/o male with PMH cholangiocarinoma and gastroesophageal reflux disease (GERD) presents with urinary retention. Pt visited, wife at bedside. Pt and wife requesting more information on diet recommendations for management of GERD. Provided handout on GERD Nutrition therapy, discussed foods recommended vs foods not recommended. Noted pt with poor appetite and intake for the past few days, eating bits and pieces of meals. Recommended small, frequent meals to help increase intake. Offered to order ONS to help meet nutritional needs, pt agreeable to try. Answered all questions at this time. Will follow per protocol.      4/21- Pt chart reviewed d/t LOS.  Attempted visit, though pt appeared occupied on the phone. Dtr asked RD to return once diet advanced. NPO for pleurx.  Nursing notes reports Percent Meals Eaten (%): 75 % intake for last meal.  Tolerating po diet without diarrhea, emesis, or constipation.   No significant weight changes noted.     ANTHROPOMETRICS:  Ht: 180.3 cm (5' 11\")  Wt: 91.1 kg (200 lb 13.4 oz).   BMI: Body mass index is 28.01 kg/m².  IBW: 78.2 kg      WEIGHT HISTORY:   Weight loss: No    Wt Readings from Last 10 Encounters:   04/17/25 91.1 kg (200 lb  13.4 oz)   03/24/25 91.6 kg (202 lb)   02/24/25 93 kg (205 lb)   10/14/24 89.8 kg (198 lb)   08/28/24 92.1 kg (203 lb)   07/11/24 93 kg (205 lb 0.4 oz)   07/03/24 93 kg (205 lb)   02/20/23 90.3 kg (199 lb)   02/03/22 91.7 kg (202 lb 3.2 oz)   10/08/21 93 kg (205 lb)        NUTRITION:  Diet:       Procedures    Regular/General diet Is Patient on Accuchecks? No      Food Allergies: No  Cultural/Ethnic/Confucianism Preferences Addressed: Yes    Percent Meals Eaten (last 3 days)       Date/Time Percent Meals Eaten (%)    04/21/25 0905 75 %    04/21/25 1716 75 %    04/22/25 0925 0 %    04/22/25 2140 25 %    04/23/25 0119 0 %    04/23/25 0537 0 %    04/23/25 1000 25 %            GI system review: constipation and GERD  Last BM Date: 04/22/25  Skin and wounds: intact    NUTRITION RELATED PHYSICAL FINDINGS:     1. Body Fat/Muscle Mass: no wasting noted      2. Fluid Accumulation: none per RN documentation    NUTRITION PRESCRIPTION:  91.1 kg Actual Body Weight  Calories: 2000 - 2300 calories/day (22-25 kcal/kg)  Protein: 73 - 118 grams protein/day (0.8-1.3 grams protein per kg)  Fluid: ~1 ml/kcal or per MD discretion    NUTRITION DIAGNOSIS/PROBLEM:  Predicted suboptimal energy intake related to insufficient appetite resulting in inadequate nutrition intake and inability to take or tolerate as evidenced by documented/reported insufficient oral intake      MONITOR AND EVALUATE/NUTRITION GOALS:  PO intake of 75% of meals TID - New  PO intake of 75% of oral nutrition supplement/s - New      MEDICATIONS:  Tums, colace,     LABS:  Na+ 134, K+ 5.2      Pt is at Moderate nutrition risk      Tanika Hoyos, MS, RDN, LDN  Clinical Dietitian

## 2025-04-24 NOTE — PROGRESS NOTES
[facility]  Progress Note    Bruce R Salus Patient Status:  Inpatient    10/31/1950 MRN [id number]   Location [facility] 3NW-A Attending Scarlett Saenz*   Hosp Day # 9 PCP Arlene Bruce MD     Subjective:  Bruce R Salus is a(n) 74 year old male.Pt with BM after enema yesterday some lower abd pain has GERD heartburn still poor appetite  ROS: No SOB CP    Objective:  Blood pressure 103/63, pulse 80, temperature 97.9 °F (36.6 °C), temperature source Oral, resp. rate 18, height 5' 11\" (1.803 m), weight 200 lb 13.4 oz (91.1 kg), SpO2 100%.  Physical Exam:  GEN: well developed, well nourished, NAD   HEENT: non-icteric   LUNGS: CTA  CARDIO: RRR  GI: good BS's,no masses, HSM or tenderness RECTAL: Exam not done. EXTREM.: no edema NEURO: A + O      Labs:            Assessment:  Problem List[1]     Bruce R Salus is a(n) 74 year old male. Pt with met cholangio ca.  Palliative ascites drain placed.  Still lower abd pain min BM except with enema.  On narcotics immobility multi factorial. CT cw ileus.       Plan:    1.  Increase PPI to BID.  2.  Minimize Morphine cont Norco.  3.  Nutrition eval.  4.  Ambien at bedtime prn    Total time spent on patient care:  20 minutes    Mike Akers MD  2025  7:59 AM         [1]   Patient Active Problem List  Diagnosis    Essential hypertension    KAREN on CPAP    History of kidney stones    Aorto-iliac atherosclerosis    BPH without obstruction/lower urinary tract symptoms    Elevated prostate specific antigen (PSA)    Moderate episode of recurrent major depressive disorder (HCC)    BMI 28.0-28.9,adult    History of nonmelanoma skin cancer    Urinary retention    Palliative care encounter    Goals of care, counseling/discussion

## 2025-04-24 NOTE — PHYSICAL THERAPY NOTE
PHYSICAL THERAPY TREATMENT NOTE - INPATIENT    Room Number: 321/321-A     Session: 2     Number of Visits to Meet Established Goals: 3    Presenting Problem: urinary retention  Co-Morbidities : HTN, HL, cholangiocarcinoma, KAREN, GERD    PHYSICAL THERAPY ASSESSMENT   Patient demonstrates fair progress this session, goals  remain in progress.      Patient is requiring stand-by assist and contact guard assist as a result of the following impairments: decreased functional strength, decreased endurance/aerobic capacity, pain, and dizziness .     Patient continues to function below baseline with gait and stair negotiation.  Next session anticipate patient to progress gait.  Physical Therapy will continue to follow patient for duration of hospitalization.    Patient continues to benefit from continued skilled PT services: at discharge to promote prior level of function and safety with additional support and return home with home health PT.    PLAN DURING HOSPITALIZATION  Nursing Mobility Recommendation : 1 Assist     PT Treatment Plan: Bed mobility, Body mechanics, Endurance, Energy conservation, Patient education, Neuromuscular re-educate, Gait training, Range of motion, Stoop training, Strengthening, Stair training, Transfer training, Balance training  Frequency (Obs):  (1-2x/week)     CURRENT GOALS       Goal #1 Patient is able to demonstrate supine - sit EOB @ level: modified independent  met   Goal #2 Patient is able to demonstrate transfers Sit to/from Stand at assistance level: modified independent     Goal #3 Patient is able to ambulate 150 feet with assist device: LRD at assistance level: modified independent     Goal #4    Goal #5    Goal #6    Goal Comments: Goals established on 4/19/2025      PHYSICAL THERAPY MEDICAL/SOCIAL HISTORY  4/24/2025 all goals ongoing.    SUBJECTIVE  \" I am slightly fuzzy\"    OBJECTIVE  Precautions: Bed/chair alarm    WEIGHT BEARING RESTRICTION     PAIN ASSESSMENT   Rating: Unable to  rate  Location: abdomen  Management Techniques: Activity promotion, Breathing techniques, Body mechanics, Relaxation, Repositioning    BALANCE                                                                                                                       Static Sitting: Good  Dynamic Sitting: Fair           Static Standing: Fair  Dynamic Standing: Fair    ACTIVITY TOLERANCE                         O2 WALK       AM-PAC '6-Clicks' INPATIENT SHORT FORM - BASIC MOBILITY  How much difficulty does the patient currently have...  Patient Difficulty: Turning over in bed (including adjusting bedclothes, sheets and blankets)?: None   Patient Difficulty: Sitting down on and standing up from a chair with arms (e.g., wheelchair, bedside commode, etc.): A Little   Patient Difficulty: Moving from lying on back to sitting on the side of the bed?: None   How much help from another person does the patient currently need...   Help from Another: Moving to and from a bed to a chair (including a wheelchair)?: A Little   Help from Another: Need to walk in hospital room?: A Little   Help from Another: Climbing 3-5 steps with a railing?: A Little     AM-PAC Score:  Raw Score: 20   Approx Degree of Impairment: 35.83%   Standardized Score (AM-PAC Scale): 47.67   CMS Modifier (G-Code): CJ    FUNCTIONAL ABILITY STATUS  Gait Assessment   Functional Mobility/Gait Assessment  Gait Assistance: Contact guard assist  Distance (ft): 100  Assistive Device: Rolling walker  Pattern:  (slow noe)    Skilled Therapy Provided    Bed Mobility:  Rolling: mod ind   Supine<>Sit: mod ind with use of rail via log roll.    Sit<>Supine: NT     Transfer Mobility:  Sit<>Stand: sup   Stand<>Sit: sup   Gait: RW and CGA and chair follow. Cues for upright posture.    Therapist's Comments: RN approved session.   Education provided on  Benefits of upright position  Promotion of walking with nursing staff      Exercises   Body mechanics       THERAPEUTIC  EXERCISES  Lower Extremity Ankle pumps  Hip AB/AD  Heel raises  Heel slides  LAQ     Upper Extremity Elbow flex/ext and OH Reaching     Position Sitting and Supine     Repetitions   12   Sets   1     Patient End of Session: Up in chair, Needs met, Call light within reach, RN aware of session/findings, All patient questions and concerns addressed, Family present    PT Session Time: 30 minutes  Gait Training: 15 minutes  Therapeutic Activity: 5 minutes  Therapeutic Exercise: 8 minutes   Neuromuscular Re-education: 0 minutes

## 2025-04-25 LAB
ANION GAP SERPL CALC-SCNC: 9 MMOL/L (ref 0–18)
BASOPHILS # BLD AUTO: 0.06 X10(3) UL (ref 0–0.2)
BASOPHILS NFR BLD AUTO: 0.6 %
BUN BLD-MCNC: 48 MG/DL (ref 9–23)
CALCIUM BLD-MCNC: 10 MG/DL (ref 8.7–10.6)
CHLORIDE SERPL-SCNC: 99 MMOL/L (ref 98–112)
CO2 SERPL-SCNC: 27 MMOL/L (ref 21–32)
CREAT BLD-MCNC: 2.08 MG/DL (ref 0.7–1.3)
EGFRCR SERPLBLD CKD-EPI 2021: 32 ML/MIN/1.73M2 (ref 60–?)
EOSINOPHIL # BLD AUTO: 0.14 X10(3) UL (ref 0–0.7)
EOSINOPHIL NFR BLD AUTO: 1.3 %
ERYTHROCYTE [DISTWIDTH] IN BLOOD BY AUTOMATED COUNT: 15.8 %
GLUCOSE BLD-MCNC: 107 MG/DL (ref 70–99)
HCT VFR BLD AUTO: 33 % (ref 39–53)
HGB BLD-MCNC: 10.6 G/DL (ref 13–17.5)
IMM GRANULOCYTES # BLD AUTO: 0.15 X10(3) UL (ref 0–1)
IMM GRANULOCYTES NFR BLD: 1.4 %
LYMPHOCYTES # BLD AUTO: 1.01 X10(3) UL (ref 1–4)
LYMPHOCYTES NFR BLD AUTO: 9.7 %
MCH RBC QN AUTO: 27.4 PG (ref 26–34)
MCHC RBC AUTO-ENTMCNC: 32.1 G/DL (ref 31–37)
MCV RBC AUTO: 85.3 FL (ref 80–100)
MONOCYTES # BLD AUTO: 1.28 X10(3) UL (ref 0.1–1)
MONOCYTES NFR BLD AUTO: 12.3 %
NEUTROPHILS # BLD AUTO: 7.79 X10 (3) UL (ref 1.5–7.7)
NEUTROPHILS # BLD AUTO: 7.79 X10(3) UL (ref 1.5–7.7)
NEUTROPHILS NFR BLD AUTO: 74.7 %
OSMOLALITY SERPL CALC.SUM OF ELEC: 293 MOSM/KG (ref 275–295)
PLATELET # BLD AUTO: 460 10(3)UL (ref 150–450)
POTASSIUM SERPL-SCNC: 5 MMOL/L (ref 3.5–5.1)
RBC # BLD AUTO: 3.87 X10(6)UL (ref 3.8–5.8)
SODIUM SERPL-SCNC: 135 MMOL/L (ref 136–145)
WBC # BLD AUTO: 10.4 X10(3) UL (ref 4–11)

## 2025-04-25 RX ORDER — PSEUDOEPHEDRINE HCL 30 MG
100 TABLET ORAL 2 TIMES DAILY
Qty: 60 CAPSULE | Refills: 0 | Status: SHIPPED | OUTPATIENT
Start: 2025-04-25 | End: 2025-04-28

## 2025-04-25 RX ORDER — PANTOPRAZOLE SODIUM 40 MG/1
40 TABLET, DELAYED RELEASE ORAL
Qty: 60 TABLET | Refills: 0 | Status: SHIPPED | OUTPATIENT
Start: 2025-04-25

## 2025-04-25 RX ORDER — OXYCODONE AND ACETAMINOPHEN 5; 325 MG/1; MG/1
2 TABLET ORAL EVERY 4 HOURS PRN
Qty: 30 TABLET | Refills: 0 | Status: SHIPPED | OUTPATIENT
Start: 2025-04-25 | End: 2025-04-28

## 2025-04-25 RX ORDER — ALBUMIN (HUMAN) 12.5 G/50ML
25 SOLUTION INTRAVENOUS ONCE
Status: COMPLETED | OUTPATIENT
Start: 2025-04-25 | End: 2025-04-25

## 2025-04-25 RX ORDER — BISACODYL 10 MG
10 SUPPOSITORY, RECTAL RECTAL
Qty: 30 SUPPOSITORY | Refills: 0 | Status: SHIPPED | OUTPATIENT
Start: 2025-04-25 | End: 2025-04-28

## 2025-04-25 NOTE — PLAN OF CARE
Pt asymptomatic with low bp,  c/o of being dizziness and light headed while in bed. Attending  notified, orders received to give Albumin. Post albumin bp WNL, pt asymptomatic.

## 2025-04-25 NOTE — CM/SW NOTE
04/25/25 1100   Discharge disposition   Expected discharge disposition Home-Health   Post Acute Care Provider   ([facility] MORATAYA)   Outpatient services Palliative   Home services after discharge Senior services   DME/Infusion Providers Other (comment);[facility]  ([facility] for Pleurx, hospital bed, transport W/C)   Discharge transportation Private car     Case discussed with RN, anticipating DC for today. ROBERT met with pt and sps to discuss DC planning.    Confirmed HME to deliver equipment to home tomorrow. [facility] to see for SOC tomorrow. ROBERT provided family with list of private duty CG, and will call [facility]SS for referral.    Pt and family asked about community PC. ROBERT sent referral/ order via [facility].    Pt and family report no further questions, at this time.    Addendum 1410:  ROBERT received call from pt sps, DC cancelled for today. ROBERT notifed HH/DME. SW/CM to update once cleared for DC.    Addendum 1615:  Vijaya Malone met with family. Family agreeable to meeting. Per liaison [facility] agreed to [facility]. SW reserved in [facility], and updated AVS.    ROBERT/CM to remain available for dc planning, and/or additional need for support.    Gabriel CRUZ, HERO  Discharge Planner  [phone]

## 2025-04-25 NOTE — PHYSICAL THERAPY NOTE
PHYSICAL THERAPY TREATMENT NOTE - INPATIENT    Room Number: 321/321-A     Session: 3     Number of Visits to Meet Established Goals: 3    Presenting Problem: urinary retention  Co-Morbidities : HTN, HL, cholangiocarcinoma, KAREN, GERD    PHYSICAL THERAPY ASSESSMENT   Patient demonstrates good  progress this session, goals  remain in progress.      Patient is requiring stand-by assist and contact guard assist as a result of the following impairments: decreased functional strength, decreased endurance/aerobic capacity, pain, and dizziness .     Patient continues to function below baseline with gait and stair negotiation.  Next session anticipate patient to progress gait.  Physical Therapy will continue to follow patient for duration of hospitalization.    Patient continues to benefit from continued skilled PT services: at discharge to promote prior level of function and safety with additional support and return home with home health PT.    PLAN DURING HOSPITALIZATION  Nursing Mobility Recommendation : 1 Assist     PT Treatment Plan: Bed mobility, Body mechanics, Endurance, Energy conservation, Patient education, Neuromuscular re-educate, Gait training, Range of motion, Stoop training, Strengthening, Stair training, Transfer training, Balance training  Frequency (Obs):  (1-2x/week)     CURRENT GOALS       Goal #1 Patient is able to demonstrate supine - sit EOB @ level: modified independent  met   Goal #2 Patient is able to demonstrate transfers Sit to/from Stand at assistance level: modified independent  MET   Goal #3 Patient is able to ambulate 150 feet with assist device: LRD at assistance level: modified independent     Goal #4 New goal: navigate stairs with one rail and cga  Achieved on 4/25   Goal #5    Goal #6    Goal Comments: Goals established on 4/19/2025      PHYSICAL THERAPY MEDICAL/SOCIAL HISTORY  4/25/2025 see above    SUBJECTIVE  \" I am slightly dizzy.\"    OBJECTIVE  Precautions: Bed/chair alarm    WEIGHT  BEARING RESTRICTION     PAIN ASSESSMENT   Rating: Unable to rate  Location: abdomen  Management Techniques: Activity promotion, Breathing techniques, Body mechanics, Relaxation, Repositioning    BALANCE                                                                                                                       Static Sitting: Good  Dynamic Sitting: Fair           Static Standing: Fair  Dynamic Standing: Fair    ACTIVITY TOLERANCE      /                   O2 WALK       AM-PAC '6-Clicks' INPATIENT SHORT FORM - BASIC MOBILITY  How much difficulty does the patient currently have...  Patient Difficulty: Turning over in bed (including adjusting bedclothes, sheets and blankets)?: None   Patient Difficulty: Sitting down on and standing up from a chair with arms (e.g., wheelchair, bedside commode, etc.): None   Patient Difficulty: Moving from lying on back to sitting on the side of the bed?: None   How much help from another person does the patient currently need...   Help from Another: Moving to and from a bed to a chair (including a wheelchair)?: None   Help from Another: Need to walk in hospital room?: A Little   Help from Another: Climbing 3-5 steps with a railing?: A Little     AM-PAC Score:  Raw Score: 22   Approx Degree of Impairment: 20.91%   Standardized Score (AM-PAC Scale): 53.28   CMS Modifier (G-Code): CJ    FUNCTIONAL ABILITY STATUS  Gait Assessment   Functional Mobility/Gait Assessment  Gait Assistance: Supervision  Distance (ft): 100  Assistive Device: Rolling walker  Pattern:  (slow noe)  Stairs: Stairs  How Many Stairs: 12  Device: 1 Rail  Assist: Contact guard assist    Skilled Therapy Provided    Bed Mobility:  Rolling: mod ind   Supine<>Sit: mod ind with use of rail via log roll.    Sit<>Supine: NT     Transfer Mobility:  Sit<>Stand: mod ind   Stand<>Sit: mod ind   Gait: RW and SBA and chair follow. Cues for upright posture.    Therapist's Comments: RN approved session. Stairs  navigated with cga, via step to pattern. Educated to hold rail with two hands.   Education provided on  Benefits of upright position  Promotion of walking with nursing staff      Exercises   Body mechanics         Patient End of Session: Up in chair, Needs met, Call light within reach, RN aware of session/findings, All patient questions and concerns addressed, Family present    PT Session Time: 30 minutes  Gait Training: 15 minutes  Therapeutic Activity: 10minutes  Therapeutic Exercise:  minutes   Neuromuscular Re-education: 0 minutes

## 2025-04-25 NOTE — PROGRESS NOTES
[facility]  Progress Note    Bruce R Salus Patient Status:  Inpatient    10/31/1950 MRN [id number]   Location [facility] 3NW-A Attending Scarlett Saenz*   Hosp Day # 10 PCP Arlene Bruce MD     Subjective:  Patient had bowel movement last night.  His abdomen feels about the same.  Primary checked CA 19-9 was elevated to 1568.    Objective:  Blood pressure 101/55, pulse 75, temperature 98.4 °F (36.9 °C), temperature source Oral, resp. rate 19, height 5' 11\" (1.803 m), weight 200 lb 13.4 oz (91.1 kg), SpO2 94%.    Gen: lying in bed, appears fatigued  HEENT: normocephalic  CV: regular rate  Lungs: CTA   Abd: moderately distended, mild TTP  Extrem: no BLE edema  Skin: No rash  Neuro: AOx3    Labs:        Lab Results   Component Value Date    WBC 10.8 2025    HGB 12.5 (L) 2025    HCT 38.7 (L) 2025    .0 (H) 2025    NEPERCENT 72.4 2025    LYPERCENT 10.9 2025    MOPERCENT 13.9 2025    EOPERCENT 0.5 2025    BAPERCENT 0.6 2025    NE 7.82 (H) 2025    LYMABS 1.17 2025    MOABSO 1.50 (H) 2025    EOABSO 0.05 2025    BAABSO 0.06 2025           Assessment and Plan:  Metastatic cholangiocarcinoma  - currently on FOLFIRI  - no current plans for intpt chemo  - had extensive d/w pt and daughter that at this time we will try to get him improved from his acute issues, so that he can follow-up with Dr. Cassidy for cancer management  - CA 19-9: 1568  - Discussed that the CA 19-9 has gone up from outpatient lab.  However these are 2 different labs and ranges can be different.  Also levels could be affected by acute issues going on in the hospital.  - follow-up Dr. Cassidy on discharge    Recurrent ascites  - s/p abdominal pleurx     Anemia  - Hgb stable  - cont to monitor    CVC-associated DVT  - cont Xarelto 20mg daily     Ileus  - CT abd/pelvis : possibly adynamic ileus  - GI consulted - recommend PT  - advised  pt and daughter to discuss bowel regimen with GI  - pain mgmt per primary    Coagulopathy  - INR elevated  - s/p Vitamin K 5mg PO x1     CoNS bacteremia  - 1/2 BlcX from 4/15 positive  - could be contaminant  - abx per ID    Thank you for allowing me to participate in the care of this patient.  Patient can be discharged from oncology standpoint.  Follow-up with Dr. Cassidy on discharge.    Natalia Diehl MD  [facility]  Department of Oncology and Hematology

## 2025-04-25 NOTE — DISCHARGE SUMMARY
[facility] Hospitalist Discharge Summary     Patient ID:  Bruce R Salus  74 year old  10/31/1950    Admit date: 4/14/2025    Discharge date and time: 04/25/25     Attending Physician: Scarlett Saenz*     Primary Care Physician: Arlene Bruce MD     Discharge Diagnoses: Urinary retention [R33.9]    Please note that only IHP DMG and EMG patients enrolled in the Medicare ACO, BCBS ACO and BCBS HMOs will be handled by the [facility] Care Management team.  For all other patients, please follow usual protocol for discharge care transition.    Discharge Condition: stable    Disposition:  home/C    Important Follow up:  - PCP within 2 weeks       Follow-up Information       [facility]SE Follow up in 1 month(s).    Specialty: Registered Nurse  Why: catheter removal or catheter exchange  Contact information:  [address]  [address]  [facility] [address] [phone]                                 Hospital Course:      74 year old male with PMH including but not limited to HTN, HL, Cholangiocarcinoma, KAREN, GERD who p/t [facility] ED c urinary retention.   Pt has cholangiocarcinoma with peritoneal mets and recent recurrent cholangitis secondary to common hepatic duct stricture extending to right main hepatic duct with placement of 10 fr x 12 cm plastic biliary stent on March 24, 2025. Now here c urinary retention and noted to have ascites and brbpr.     Cholangiocarcinoma with peritoneal mets and recent recurrent cholangitis secondary to common hepatic duct stricture extending to right main hepatic duct with placement of 10 fr x 12 cm plastic biliary stent on 3/24/25  Ascites   brbpr.   - GI following, apprec  - ultrasound abd c mod degress ascites  - CMP mostly ok  - s/p para 4/15; f/u studies; cytology with metastatic tumor cells    - WBCs 1083, 54% N, empiric CTX started 4/15 plan for 5d course per GI, EOT 4/20 - completed  - resumed xarelto  (held for pleurex placement)  - proctozone cream BID PRN for bleeding   - onc c/s   - sent blood cxs for completeness; one set c GPC in clusters, assume contaminant, re-sent blood cxs, NGTD   - d/w MD Angel and MD Milvia, ascities has re-accumulated, repeat para; will need to set up o/p frances v pleurex   - s/p IR pleurX placement 4/22, 4L removed  - CT a/p to assess progression reviewed c pt, omental carcinomatosis.   - PCT 0.25  - Bloating, needed lactulose and enema to finally have BM 4/19 PM  - encouraged ambulation, BID colace, relistor to help with bowel motility   - CT A/P 4/22 with adynamic ileus, likely from overalll deconditioning   - aggressive bowel regimen      # Urinary retention  - d/w RAFAELA Carreon and Dr. Luna  - s/p cystoscopy, (complicated) placement of wilkerson catheter (over wire) 4/14  - Ucx NG <18hrs  - follow PVR  - flomax  -Continue with wilkerson catheter to gravity drainage.    - F/U with urology in 1 month for catheter removal or exchange.      Anemia, chronic  - chronic disease, malignancy   - monitor      # HL  -statin     # GERD  -PPI, wean as o/p if appropriate     # Major Depression/ Generalized anxiety d/o   -reviewed home meds, continue SSRI currently appears stable      # HTN  - resumed lisinopril       Consults: IP CONSULT TO UROLOGY  IP CONSULT TO HOSPITALIST  IP CONSULT TO GASTROENTEROLOGY  IP CONSULT TO ONCOLOGY  IP CONSULT TO SOCIAL WORK  IP CONSULT TO SOCIAL WORK  IP CONSULT TO GASTROENTEROLOGY  IP CONSULT PALLIATIVE CARE  IP CONSULT TO FOOD AND NUTRITION SERVICES  IP CONSULT TO FOOD AND NUTRITION SERVICES  IP CONSULT TO SOCIAL WORK  NURSING CONSULT TO DIETITIAN  IP CONSULT TO SOCIAL WORK  IP CONSULT TO SOCIAL WORK    Operative Procedures: Procedure(s) (LRB):  CYSTOSCOPY, complicatited  WILKERSON CATHETER PLACEMENT (N/A)       Patient instructions:      I as the attending physician reconciled the current and discharge medications on day of discharge.     Current Discharge Medication  List        START taking these medications    Details   pantoprazole 40 MG Oral Tab EC Take 1 tablet (40 mg total) by mouth 2 (two) times daily before meals.      bisacodyl 10 MG Rectal Suppos Place 1 suppository (10 mg total) rectally daily as needed.      docusate sodium 100 MG Oral Cap Take 100 mg by mouth 2 (two) times daily.      oxyCODONE-acetaminophen 5-325 MG Oral Tab Take 2 tablets by mouth every 4 (four) hours as needed.      Naloxone HCl 4 MG/0.1ML Nasal Liquid 4 mg by Nasal route as needed. If patient remains unresponsive, repeat dose in other nostril 2-5 minutes after first dose.           CONTINUE these medications which have NOT CHANGED    Details   Benazepril HCl 20 MG Oral Tab Take 1 tablet (20 mg total) by mouth daily.      tamsulosin 0.4 MG Oral Cap Take 1 capsule (0.4 mg total) by mouth 2 (two) times daily.      venlafaxine ER 37.5 MG Oral Capsule SR 24 Hr Take 1 capsule (37.5 mg total) by mouth daily.      acetaminophen 500 MG Oral Tab Take 2 tablets (1,000 mg total) by mouth 3 (three) times daily as needed for Pain.      rivaroxaban (XARELTO) 20 MG Oral Tab Take 1 tablet (20 mg total) by mouth daily with food.      simvastatin 20 MG Oral Tab Take 1 tablet (20 mg total) by mouth nightly. At Bedtime      MULTI VITAMIN MENS OR TABS Take 1 tablet by mouth daily.           STOP taking these medications       cefadroxil 500 MG Oral Cap        omeprazole 20 MG Oral Capsule Delayed Release              Activity: activity as tolerated  Diet: regular diet  Wound Care: as directed  Code Status: Full Code      Discharge Exam:     General: no acute distress, alert and oriented x 3  Heart: RRR  Lungs: clear bilaterally, no active wheezing  Abdomen: nontender, nondistended, intact BS. PleurX  Extremities: no pedal edema   Neuro: CN inact, no focal deficits      Total time coordinating care for discharge: Greater than 30 minutes    Jose Saenz MD  [facility]

## 2025-04-25 NOTE — PROGRESS NOTES
[facility]  Progress Note    Bruce R Salus Patient Status:  Inpatient    10/31/1950 MRN [id number]   Location [facility] 3NW-A Attending Scarlett Saenz*   Hosp Day # 10 PCP Arlene Bruce MD     Subjective:  Bruce R Salus is a(n) 74 year old male.No NV  ROS: No rectal bleeding    Objective:  Blood pressure 101/55, pulse 75, temperature 98.4 °F (36.9 °C), temperature source Oral, resp. rate 19, height 5' 11\" (1.803 m), weight 200 lb 13.4 oz (91.1 kg), SpO2 94%.  Physical Exam:  GEN: well developed, well nourished, NAD   GI: +BS diffuse tenderness no guarding RECTAL: Exam not done. EXTREM.: no edema NEURO: A + O      Labs:            Assessment:  Problem List[1]     Bruce R Salus is a(n) 74 year old male. Pt with cholangio.  Abd pain, refractory on narcotics.  Constipation had Bm.  Ascites sp palliative pleurex cath      Plan:    1.  Cont supportive care..  2.  Likely DC today noted      Total time spent on patient care:  16 minutes    Mike Akers MD  2025  8:02 AM  Pt having BM ongoing pain       [1]   Patient Active Problem List  Diagnosis    Essential hypertension    KAREN on CPAP    History of kidney stones    Aorto-iliac atherosclerosis    BPH without obstruction/lower urinary tract symptoms    Elevated prostate specific antigen (PSA)    Moderate episode of recurrent major depressive disorder (HCC)    BMI 28.0-28.9,adult    History of nonmelanoma skin cancer    Urinary retention    Palliative care encounter    Goals of care, counseling/discussion

## 2025-04-25 NOTE — PROGRESS NOTES
Patient alert and oriented x4. Pain controlled with PRN medications per MAR. Tolerating diet, but with poor intake. Encouraged fluids and protein shake. Calvo catheter draining clear, yellow urine with diminished output. Barrier cream for sacrum. Encouraged turning on sides. PleurX cath dressing CDI. Saline locked. Up with one assist and walker. Small bowel movement tonight. Plan of care discussed with patient and family no further questions. Call light in reach.

## 2025-04-25 NOTE — PLAN OF CARE
Problem: Patient/Family Goals  Goal: Patient/Family Long Term Goal  Description: Patient's Long Term Goal: Discharge Home  Interventions: Pain Tolerance, Diet Tolerance, Return to normal ADL's, encourage ambulation  See additional Care Plan goals for specific interventions  Outcome: Progressing  Goal: Patient/Family Short Term Goal  Description: Patient's Short Term Goal: Prepare to Discharge Home  Interventions: Transition from IV to oral pain medications, Advance diet as tolerated, encourage ambulation,   See additional Care Plan goals for specific interventions  Outcome: Progressing     Problem: PAIN - ADULT  Goal: Verbalizes/displays adequate comfort level or patient's stated pain goal  Description: INTERVENTIONS:- Encourage pt to monitor pain and request assistance- Assess pain using appropriate pain scale- Administer analgesics based on type and severity of pain and evaluate response- Implement non-pharmacological measures as appropriate and evaluate response- Consider cultural and social influences on pain and pain management- Manage/alleviate anxiety- Utilize distraction and/or relaxation techniques- Monitor for opioid side effects- Notify MD/LIP if interventions unsuccessful or patient reports new pain- Anticipate increased pain with activity and pre-medicate as appropriate  Outcome: Progressing     Problem: RISK FOR INFECTION - ADULT  Goal: Absence of fever/infection during anticipated neutropenic period  Description: INTERVENTIONS- Monitor WBC- Administer growth factors as ordered- Implement neutropenic guidelines  Outcome: Progressing     Problem: SAFETY ADULT - FALL  Goal: Free from fall injury  Description: INTERVENTIONS:- Assess pt frequently for physical needs- Identify cognitive and physical deficits and behaviors that affect risk of falls.- [place] fall precautions as indicated by assessment.- Educate pt/family on patient safety including physical limitations- Instruct pt to call for assistance  with activity based on assessment- Modify environment to reduce risk of injury- Provide assistive devices as appropriate- Consider OT/PT consult to assist with strengthening/mobility- Encourage toileting schedule  Outcome: Progressing

## 2025-04-25 NOTE — PROGRESS NOTES
[facility] Hospitalist Progress Note     PCP: Arlene Bruce MD    Chief Complaint: follow-up   Follow up for: The primary encounter diagnosis was Urinary retention. Diagnoses of Urine retention, Anemia, unspecified type, Bright red blood per rectum, and Cholangiocarcinoma (HCC) were also pertinent to this visit.    Overnight/Interim Events:      SUBJECTIVE:  Patient seen and examined.  Long conversation with patient, wife and daughter.   Reviewed plan, plan for nutrition evaluation.  Encouraged ambulation.   Passed BM after enema yesterday.  Denies CP/SOB.  NAD.       OBJECTIVE:  Temp:  [97.4 °F (36.3 °C)-98.4 °F (36.9 °C)] 97.4 °F (36.3 °C)  Pulse:  [60-77] 71  Resp:  [19-20] 20  BP: ()/(38-55) 85/44  SpO2:  [90 %-99 %] 99 %    Intake/Output:    Intake/Output Summary (Last 24 hours) at 4/25/2025 1623  Last data filed at 4/25/2025 1500  Gross per 24 hour   Intake 480 ml   Output 500 ml   Net -20 ml       Last 3 Weights   04/17/25 0751 200 lb 13.4 oz (91.1 kg)   04/16/25 0622 200 lb 14.4 oz (91.1 kg)   04/14/25 0636 200 lb (90.7 kg)   04/14/25 0625 200 lb (90.7 kg)   03/24/25 0803 202 lb (91.6 kg)   03/11/25 1712 202 lb (91.6 kg)   02/24/25 1041 205 lb (93 kg)   02/12/25 1050 205 lb (93 kg)       Exam    General: Alert, no distress, appears stated age.     Head:  Normocephalic, without obvious abnormality, atraumatic.   Eyes:  Sclera anicteric, EOMs intact.    Nose: Nares normal,  Mucosa normal    Throat: Lips normal   Neck: Supple, symmetrical, trachea midline   Lungs:   Clear to auscultation bilaterally. Normal effort   Chest wall:  No tenderness or deformity   Heart:  Regular rate and rhythm, S1, S2 normal, no murmur, rub or gallop appreciated   Abdomen:   Soft, some +tympanic to percussion, NT/ND, Bowel sounds normal. No masses,  No organomegaly.      Extremities: Extremities normal, atraumatic, no cyanosis or LE edema.   Skin: Skin color, texture, turgor normal. No rashes or lesions.    Neurologic:  Moving all extremities spontaneously, no focal deficit appreciated      Data Review:       Labs:     Recent Labs   Lab 04/20/25  0513 04/21/25  0830 04/22/25  0538 04/23/25  0638   WBC 6.7  --  7.6 10.8   HGB 11.0*  --  11.4* 12.5*   MCV 84.0  --  85.8 84.9   .0  --  516.0* 581.0*   INR  --  1.74* 1.43*  --        Recent Labs   Lab 04/19/25  0606 04/20/25  0513 04/22/25  0538 04/23/25  0638   NA  --  139 138 134*   K  --  4.5 4.5 5.2*   CL  --  107 106 102   CO2  --  24.0 25.0 23.0   BUN  --  18 21 22   CREATSERUM  --  1.18 1.11 1.28   CA  --  9.6 9.5 9.6   MG 1.6 1.9  --   --    GLU  --  138* 123* 125*       Recent Labs   Lab 04/23/25  0638   ALT 23   AST 33   ALB 3.3         No results for input(s): \"PGLU\" in the last 168 hours.    No results for input(s): \"TROP\" in the last 168 hours.      Meds:     Scheduled Medications[1]  Medication Infusions[2]  PRN Medications[3]       Assessment/Plan:     74 year old male with PMH including but not limited to HTN, HL, Cholangiocarcinoma, KAREN, GERD who p/t [facility] c urinary retention.      Cholangiocarcinoma with peritoneal mets and recent recurrent cholangitis secondary to common hepatic duct stricture extending to right main hepatic duct with placement of 10 fr x 12 cm plastic biliary stent on 3/24/25  Ascites   brbpr.   - GI following, apprec  - ultrasound abd c mod degress ascites  - CMP mostly ok  - s/p para 4/15; f/u studies; cytology with metastatic tumor cells    - WBCs 1083, 54% N, empiric CTX started 4/15 plan for 5d course per GI, EOT 4/20 - completed  - resumed xarelto (held for pleurex placement)  - proctozone cream BID PRN for bleeding   - onc c/s   - sent blood cxs for completeness; one set c GPC in clusters, assume contaminant, re-sent blood cxs, NGTD   - d/w MD Angel and MD Milvia, ascities has re-accumulated, repeat para; will need to set up o/p frances v pleurex   - s/p IR pleurX placement 4/22, 4L removed  - CT a/p to assess progression reviewed c  pt, omental carcinomatosis.   - PCT 0.25  - Bloating, needed lactulose and enema to finally have BM 4/19 PM  - encouraged ambulation, BID colace, relistor to help with bowel motility   - CT A/P 4/22 with adynamic ileus, likely from overalll deconditioning   - aggressive bowel regimen      # Urinary retention  - d/w RAFAELA Carreon and Dr. Luna  - s/p cystoscopy, (complicated) placement of wilkerson catheter (over wire) 4/14  - Ucx NG <18hrs  - follow PVR  - flomax  -Continue with wilkerson catheter to gravity drainage.    - F/U with urology in 1 month for catheter removal or exchange.     Low BP  - likely fluid losses from ascites and poor po intake  - give albumin 25g today  - discontinue lisinopril  - repeat BMP/CBC     Anemia, chronic  - chronic disease, malignancy   - monitor      # HL  -statin     # GERD  -PPI, wean as o/p if appropriate     # Major Depression/ Generalized anxiety d/o   -reviewed home meds, continue SSRI currently appears stable      # HTN  - hold lisinopril for low BP        # Proph  - scds     Dispo: cancel dc today, await improvement of BP parameters , hopefully dc tomorrow      Jose Saenz MD  [facility] Hospitalist  Message over [employer]  Pager: [phone]          [1]    pantoprazole  40 mg Oral BID AC    polyethylene glycol (PEG 3350)  17 g Oral BID    methylnaltrexone  6 mg Subcutaneous Q48H    docusate sodium  100 mg Oral BID    rivaroxaban  20 mg Oral Daily with food    lisinopril  20 mg Oral Daily    tamsulosin  0.4 mg Oral BID    atorvastatin  10 mg Oral Nightly    venlafaxine ER  37.5 mg Oral Nightly   [2] [3]   zolpidem    oxyCODONE-acetaminophen **OR** oxyCODONE-acetaminophen    calcium carbonate    ondansetron    simethicone    morphINE **OR** morphINE **OR** [DISCONTINUED] morphINE    tiZANidine    polyethylene glycol (PEG 3350)    bisacodyl    acetaminophen    hydrocortisone

## 2025-04-26 LAB
ALBUMIN SERPL-MCNC: 3.3 G/DL (ref 3.2–4.8)
ALBUMIN/GLOB SERPL: 1.4 {RATIO} (ref 1–2)
ALP LIVER SERPL-CCNC: 124 U/L (ref 45–117)
ALT SERPL-CCNC: 17 U/L (ref 10–49)
ANION GAP SERPL CALC-SCNC: 7 MMOL/L (ref 0–18)
AST SERPL-CCNC: 26 U/L (ref ?–34)
BASOPHILS # BLD AUTO: 0.07 X10(3) UL (ref 0–0.2)
BASOPHILS NFR BLD AUTO: 0.6 %
BILIRUB SERPL-MCNC: 0.3 MG/DL (ref 0.2–1.1)
BILIRUB UR QL STRIP.AUTO: NEGATIVE
BUN BLD-MCNC: 50 MG/DL (ref 9–23)
CALCIUM BLD-MCNC: 9.9 MG/DL (ref 8.7–10.6)
CHLORIDE SERPL-SCNC: 102 MMOL/L (ref 98–112)
CO2 SERPL-SCNC: 25 MMOL/L (ref 21–32)
COLOR UR AUTO: YELLOW
CREAT BLD-MCNC: 1.78 MG/DL (ref 0.7–1.3)
CREAT UR-SCNC: 180.4 MG/DL
EGFRCR SERPLBLD CKD-EPI 2021: 39 ML/MIN/1.73M2 (ref 60–?)
EOSINOPHIL # BLD AUTO: 0.14 X10(3) UL (ref 0–0.7)
EOSINOPHIL NFR BLD AUTO: 1.3 %
EOSINOPHIL URNS QL WRIGHT STN: NEGATIVE
ERYTHROCYTE [DISTWIDTH] IN BLOOD BY AUTOMATED COUNT: 15.9 %
GLOBULIN PLAS-MCNC: 2.3 G/DL (ref 2–3.5)
GLUCOSE BLD-MCNC: 111 MG/DL (ref 70–99)
GLUCOSE UR STRIP.AUTO-MCNC: NORMAL MG/DL
HCT VFR BLD AUTO: 35.1 % (ref 39–53)
HGB BLD-MCNC: 11.3 G/DL (ref 13–17.5)
HYALINE CASTS #/AREA URNS AUTO: PRESENT /LPF
IMM GRANULOCYTES # BLD AUTO: 0.2 X10(3) UL (ref 0–1)
IMM GRANULOCYTES NFR BLD: 1.8 %
LEUKOCYTE ESTERASE UR QL STRIP.AUTO: 25
LYMPHOCYTES # BLD AUTO: 1.01 X10(3) UL (ref 1–4)
LYMPHOCYTES NFR BLD AUTO: 9.1 %
MCH RBC QN AUTO: 27.2 PG (ref 26–34)
MCHC RBC AUTO-ENTMCNC: 32.2 G/DL (ref 31–37)
MCV RBC AUTO: 84.6 FL (ref 80–100)
MONOCYTES # BLD AUTO: 1.14 X10(3) UL (ref 0.1–1)
MONOCYTES NFR BLD AUTO: 10.3 %
NEUTROPHILS # BLD AUTO: 8.53 X10 (3) UL (ref 1.5–7.7)
NEUTROPHILS # BLD AUTO: 8.53 X10(3) UL (ref 1.5–7.7)
NEUTROPHILS NFR BLD AUTO: 76.9 %
NITRITE UR QL STRIP.AUTO: NEGATIVE
OSMOLALITY SERPL CALC.SUM OF ELEC: 292 MOSM/KG (ref 275–295)
PH UR STRIP.AUTO: 5 [PH] (ref 5–8)
PLATELET # BLD AUTO: 475 10(3)UL (ref 150–450)
POTASSIUM SERPL-SCNC: 5 MMOL/L (ref 3.5–5.1)
PROT SERPL-MCNC: 5.6 G/DL (ref 5.7–8.2)
RBC # BLD AUTO: 4.15 X10(6)UL (ref 3.8–5.8)
SODIUM SERPL-SCNC: 134 MMOL/L (ref 136–145)
SODIUM SERPL-SCNC: 14 MMOL/L
SP GR UR STRIP.AUTO: 1.02 (ref 1–1.03)
UROBILINOGEN UR STRIP.AUTO-MCNC: NORMAL MG/DL
UUN UR-MCNC: 1130 MG/DL
WBC # BLD AUTO: 11.1 X10(3) UL (ref 4–11)

## 2025-04-26 RX ORDER — ALBUMIN (HUMAN) 12.5 G/50ML
25 SOLUTION INTRAVENOUS ONCE
Status: COMPLETED | OUTPATIENT
Start: 2025-04-26 | End: 2025-04-26

## 2025-04-26 RX ORDER — ALBUMIN (HUMAN) 12.5 G/50ML
25 SOLUTION INTRAVENOUS ONCE
Status: DISCONTINUED | OUTPATIENT
Start: 2025-04-26 | End: 2025-04-27

## 2025-04-26 RX ORDER — SUCRALFATE ORAL 1 G/10ML
1 SUSPENSION ORAL
Status: DISCONTINUED | OUTPATIENT
Start: 2025-04-26 | End: 2025-04-28

## 2025-04-26 NOTE — CONSULTS
[facility]   part of [facility]    Report of Consultation    Bruce R Salus Patient Status:  Inpatient    10/31/1950 MRN [id number]   Location [facility] 3NW-A Attending Nataliia Hassan MD   Hosp Day # 11 PCP Arlene Bruce MD     Date of consult: 2025    REASON FOR CONSULT:     LOREN    HISTORY OF PRESENT ILLNESS:     Bruce R Salus is a a(n) 74 year old male w ho cholangiocarcinoma with peritoneal metastases and malignant ascites and recurrent cholangitis secondary to common hepatic duct stricture extending to R main hepatic ducts (sees Dr Cassidy, on FOLFIRI), HTN, GERD, HL who presented w urinary retention.     Sp cystoscopy and placement of wilkerson (over wire) 25. On flomax.  Was going to discharge yesterday but then had hypotension and dizziness.   Had pleurex for ascites, getting 1L drain only now   Notes burning when he swallows now     Now w LOREN     REVIEW OF SYSTEMS:     Please see HPI for pertinent positives. 10 point review of systems otherwise reviewed and negative.     HISTORY:     Past Medical History[1]  Past Surgical History[2]  Family History[3]   reports that he quit smoking about 44 years ago. His smoking use included cigarettes. He started smoking about 52 years ago. He has a 1.6 pack-year smoking history. He has never used smokeless tobacco. He reports that he does not currently use alcohol after a past usage of about 0.8 standard drinks of alcohol per week. He reports that he does not currently use drugs after having used the following drugs: Cannabis.    ALLERGIES:     Allergies[4]    MEDICATIONS:     Current Hospital Medications[5]  Prior to Admission Medications[6]      PHYSICAL EXAM:     Vital Signs: /49 (BP Location: Right arm)   Pulse 72   Temp 98 °F (36.7 °C) (Oral)   Resp 18   Ht 5' 11\" (1.803 m)   Wt 200 lb 13.4 oz (91.1 kg)   SpO2 95%   BMI 28.01 kg/m²   Temp (24hrs), Av.8 °F (36.6 °C), Min:97.4 °F (36.3 °C), Max:98.1 °F (36.7 °C)        Intake/Output Summary (Last 24 hours) at 4/26/2025 1249  Last data filed at 4/26/2025 1100  Gross per 24 hour   Intake 640 ml   Output 1325 ml   Net -685 ml     Wt Readings from Last 3 Encounters:   04/17/25 200 lb 13.4 oz (91.1 kg)   03/24/25 202 lb (91.6 kg)   02/24/25 205 lb (93 kg)       General: NAD  HEENT: NCAT, MMM, EOMI  Neck: Supple   Cardiac: Regular rate and rhythm   Lungs: CTAB   Abdomen: Soft, non-tender, non-distended   : No CVA tenderness  Extremities: no leg edema  Neurologic/Psych: mentating well, no asterixis  Skin: No rashes    LABORATORY DATA:       Lab Results   Component Value Date     (H) 04/26/2025    BUN 50 (H) 04/26/2025    BUNCREA 16.0 02/01/2022    CREATSERUM 1.78 (H) 04/26/2025    ANIONGAP 7 04/26/2025    GFR >59 05/13/2010    CA 9.9 04/26/2025    OSMOCALC 292 04/26/2025    ALKPHO 124 (H) 04/26/2025    AST 26 04/26/2025    ALT 17 04/26/2025    BILT 0.3 04/26/2025    TP 5.6 (L) 04/26/2025    ALB 3.3 04/26/2025    GLOBULIN 2.3 04/26/2025    AGRATIO 1.5 10/02/2015     (L) 04/26/2025    K 5.0 04/26/2025     04/26/2025    CO2 25.0 04/26/2025     Lab Results   Component Value Date    WBC 11.1 (H) 04/26/2025    RBC 4.15 04/26/2025    HGB 11.3 (L) 04/26/2025    HCT 35.1 (L) 04/26/2025    .0 (H) 04/26/2025    MCV 84.6 04/26/2025    MCH 27.2 04/26/2025    MCHC 32.2 04/26/2025    RDW 15.9 04/26/2025    NEPRELIM 8.53 (H) 04/26/2025    NEPERCENT 76.9 04/26/2025    LYPERCENT 9.1 04/26/2025    MOPERCENT 10.3 04/26/2025    EOPERCENT 1.3 04/26/2025    BAPERCENT 0.6 04/26/2025    NE 8.53 (H) 04/26/2025    LYMABS 1.01 04/26/2025    MOABSO 1.14 (H) 04/26/2025    EOABSO 0.14 04/26/2025    BAABSO 0.07 04/26/2025     No results found for: \"MALBP\", \"CREUR\", \"CREAURINE\", \"MIALBURINE\", \"MCRRATIOUR\", \"MALBCRECALC\", \"MICROALBUMIN\", \"CREAUR\", \"MALBCREACALC\"  Lab Results   Component Value Date    COLORUR Yellow 07/11/2024    CLARITY Clear 07/11/2024    SPECGRAVITY 1.018 07/11/2024     GLUUR Normal 07/11/2024    BILUR Negative 07/11/2024    KETUR Negative 07/11/2024    BLOODURINE Negative 07/11/2024    PHURINE 6.5 07/11/2024    PROUR Negative 07/11/2024    UROBILINOGEN Normal 07/11/2024    NITRITE Negative 07/11/2024    LEUUR Negative 07/11/2024    NMIC Microscopic not indicated 07/11/2024    BACUR None seen 10/05/2021         IMAGING:     All imaging studies personally reviewed.    No results found.      ASSESSMENT/PLAN:   Bruce R Salus is a a(n) 74 year old male w ho cholangiocarcinoma with peritoneal metastases and malignant ascites and recurrent cholangitis secondary to common hepatic duct stricture extending to R main hepatic ducts (sees Dr Cassidy, on FOLFIRI), HTN, GERD, HL who presented w urinary retention. Sp cysto w wilkerson. Also sp pleurx 4/22. Now w hypotension and LOREN.     LOREN   -- baseline Cr ~1.0-1.2mg/dL.  Cr increased to 2.08 on 4/25 --> 1.78 today  -- LOREN 2/2 contrast inducted nephropathy (sp contrast on 4/22/25. Cr bumped about 72hrs later which fits time course) +/-  due to decreased intravascular volume status w third spacing + hypotension   -- hold lisinopril. Give albumin prn   -- check UA urine lytes urine eos to ensure (on PPI)  -- avoid long term prolonged use of carafate if renal insufficiency continues  -- avoid morphine, use dilaudid instead  -- avoid nephrotoxins and renally dose meds   -- strict UOP    Metastatic cholangiocarcinoma w Recurrent ascites   -- sp pleurx 4/22. Per oncology.     CVC asscoiated DVT  -- on xeralto, monitor and change if needed prn renal function     Illeus / dysphagia / odynophagia   -- per GI    Hypotension   -- suspected due to third spacing. IV albumin prn hernando after ascites drain and limit drain     D/w Dr Hassan    Thank you for allowing me to participate in the care of your patient. Please do not hesitate to contact me with concerns or questions.    Nette Goins MD  [facility]    4/26/2025  12:49 PM         [1]    Past Medical History:   Anxiety state    BPH (benign prostatic hyperplasia)    Calculus of kidney    Cancer (HCC)    bile duct cancer    Chemotherapy-induced nausea    Cholangiocarcinoma (HCC)    Deep vein thrombosis (HCC)    port cath had clot    Depression    Esophageal reflux    High blood pressure    High cholesterol    History of recent hospitalization    10 days per pt at [facility]- had high fever, found sepsis - treated w antibiotics/ no fever now    Hypercholesterolemia    HYPERTENSION    Hypertension    Kidney disease    LFT elevation    Metastasis to peritoneal cavity (HCC)    Moderate episode of recurrent major depressive disorder (HCC)    KAREN (obstructive sleep apnea)    mild, AHI 10, O2 jas 84%, AutoPAP 8-20    Other and unspecified hyperlipidemia    OTHER DISEASES    pneumonia after flu    Personal history of antineoplastic chemotherapy    last infusion.   JANUARY 22TH 2025    Pneumonia    Pneumonia due to organism    12 YEARS AGO    Reflux    Sleep apnea    cpap    Unspecified essential hypertension    Visual impairment    GLASSES   [2]   Past Surgical History:  Procedure Laterality Date    Cholecystectomy      Closed rx nose fx w stabilizatn  03/21/2013    Procedure: CLOSED REDUCTION NASAL FRACTURE;  Surgeon: Lianna Luna MD;  Location: [facility]    Colonoscopy N/A 08/24/2021    Procedure: COLONOSCOPY, with polypectomy;  Surgeon: Gaurav Cruz MD;  Location: [facility]    Colonoscopy & polypectomy  10/07/2015    a small polyp was removed; ASC    Colonoscopy,remv lesn,snare N/A 10/07/2015    Procedure: COLONOSCOPY, POSSIBLE BIOPSY, POSSIBLE POLYPECTOMY 46571;  Surgeon: Gaurav Cruz MD;  Location: [facility]    Cystoscopy,insert ureteral stent  04/08/2013    Procedure: CYSTOSCOPY/URETEROSCOPY/STONE EXTRACTION;  Surgeon: Eduin Luna MD;  Location: [facility]    Cystouretero w/lithotripsy  04/08/2013    Procedure: LITHOTRIPSY HOLMIUM LASER  WITH CYSTOSCOPY;  Surgeon: Eduin Luna MD;  Location: [facility]    Cystourethroscopy      Cystouretro w/stone remove  04/08/2013    Procedure: CYSTO, WITH INSERTION OF STENT;  Surgeon: Eduin Luna MD;  Location: [facility]    Ercp,diagnostic  10/2024    Other surgical history      KIDNEY STONE REMOVED     Other surgical history      excision of left renal cyst     Other surgical history  11/15/2012    Flow US, RBUS    Other surgical history  12/05/2013    flow us- DR. Wilson    Other surgical history  2016    Cystoscopy    Skin surgery  09/15/2020    [facility]-Left ear-Dr. RACHEL Hull     X-ray retrograde pyelogram  04/08/2013    Procedure: CYSTOSCOPY/URETEROSCOPY/STONE EXTRACTION;  Surgeon: Eduin Luna MD;  Location: [facility]   [3]   Family History  Problem Relation Age of Onset    Other (Other) Father         sepsis    Cancer Mother         lymphoma    Cancer Sister         thyroid CA   [4]   Allergies  Allergen Reactions    Chlorhexidine RASH     Clarissa body wipes   [5]   Current Facility-Administered Medications:     sucralfate (Carafate) 1 GM/10ML oral suspension 1 g, 1 g, Oral, TID AC and HS (2200)    pantoprazole (Protonix) DR tab 40 mg, 40 mg, Oral, BID AC    zolpidem (Ambien) tab 10 mg, 10 mg, Oral, Nightly PRN    oxyCODONE-acetaminophen (Percocet) 5-325 MG per tab 1 tablet, 1 tablet, Oral, Q4H PRN **OR** oxyCODONE-acetaminophen (Percocet) 5-325 MG per tab 2 tablet, 2 tablet, Oral, Q4H PRN    calcium carbonate (Tums) chewable tab 1,000 mg, 1,000 mg, Oral, Q6H PRN    polyethylene glycol (PEG 3350) (Miralax) 17 g oral packet 17 g, 17 g, Oral, BID    methylnaltrexone (Relistor) 12 mg/0.6mL SUBQ injection 6 mg, 6 mg, Subcutaneous, Q48H    ondansetron (Zofran) 4 MG/2ML injection 4 mg, 4 mg, Intravenous, Q6H PRN    simethicone (Mylicon) chewable tab 80 mg, 80 mg, Oral, QID PRN    morphINE PF 2 MG/ML injection 1 mg, 1 mg, Intravenous, Q2H PRN **OR** morphINE PF 2 MG/ML  injection 2 mg, 2 mg, Intravenous, Q2H PRN **OR** [DISCONTINUED] morphINE PF 4 MG/ML injection 4 mg, 4 mg, Intravenous, Q2H PRN    tiZANidine (Zanaflex) partial tab 1 mg, 1 mg, Oral, Q6H PRN    docusate sodium (Colace) cap 100 mg, 100 mg, Oral, BID    polyethylene glycol (PEG 3350) (Miralax) 17 g oral packet 17 g, 17 g, Oral, Daily PRN    bisacodyl (Dulcolax) 10 MG rectal suppository 10 mg, 10 mg, Rectal, Daily PRN    rivaroxaban (Xarelto) tab 20 mg, 20 mg, Oral, Daily with food    [Held by provider] lisinopril (Prinivil; Zestril) tab 20 mg, 20 mg, Oral, Daily    acetaminophen (Tylenol Extra Strength) tab 500-1,000 mg, 500-1,000 mg, Oral, Q4H PRN    tamsulosin (Flomax) cap 0.4 mg, 0.4 mg, Oral, BID    atorvastatin (Lipitor) tab 10 mg, 10 mg, Oral, Nightly    hydrocortisone (Anusol-HC) 2.5 % rectal cream, , Rectal, BID PRN    venlafaxine ER (Effexor-XR) 24 hr cap 37.5 mg, 37.5 mg, Oral, Nightly  [6]   Prior to Admission Medications   Medication Sig    pantoprazole 40 MG Oral Tab EC Take 1 tablet (40 mg total) by mouth 2 (two) times daily before meals.    bisacodyl 10 MG Rectal Suppos Place 1 suppository (10 mg total) rectally daily as needed.    docusate sodium 100 MG Oral Cap Take 100 mg by mouth 2 (two) times daily.    oxyCODONE-acetaminophen 5-325 MG Oral Tab Take 2 tablets by mouth every 4 (four) hours as needed.    Naloxone HCl 4 MG/0.1ML Nasal Liquid 4 mg by Nasal route as needed. If patient remains unresponsive, repeat dose in other nostril 2-5 minutes after first dose.

## 2025-04-26 NOTE — PROGRESS NOTES
[facility] Hospitalist note    PCP: Arlene Bruce MD    Chief Complaint:  F/u ascites, dizziness    SUBJECTIVE:  Yesterday felt dizzy and bp dropped. Cr up. Also reports burning when he swallows water and other substances, some belching. Having BMs.     OBJECTIVE:  Temp:  [97.4 °F (36.3 °C)-98.1 °F (36.7 °C)] 97.8 °F (36.6 °C)  Pulse:  [65-96] 73  Resp:  [18-20] 18  BP: ()/(38-59) 102/57  SpO2:  [91 %-99 %] 94 %    Intake/Output:    Intake/Output Summary (Last 24 hours) at 4/26/2025 0953  Last data filed at 4/26/2025 0600  Gross per 24 hour   Intake 640 ml   Output 300 ml   Net 340 ml       Last 3 Weights   04/17/25 0751 200 lb 13.4 oz (91.1 kg)   04/16/25 0622 200 lb 14.4 oz (91.1 kg)   04/14/25 0636 200 lb (90.7 kg)   04/14/25 0625 200 lb (90.7 kg)   03/24/25 0803 202 lb (91.6 kg)   03/11/25 1712 202 lb (91.6 kg)   02/24/25 1041 205 lb (93 kg)   02/12/25 1050 205 lb (93 kg)       Exam  Gen: No acute distress  HEENT: anicteric sclera, MMM  Pulm: Lungs clear, normal respiratory effort  CV: Heart with regular rate and rhythm  Abd: Abdomen distended, pleurex in place  MSK: Full range of motion in extremities, no clubbing, no cyanosis  Skin: no rashes or lesions  Neuro: A&OX3, no focal deficits    Data Review:         Labs:     Recent Labs   Lab 04/20/25  0513 04/21/25  0830 04/22/25  0538 04/23/25  0638 04/25/25  1642 04/26/25  0759   WBC 6.7  --  7.6 10.8 10.4 11.1*   HGB 11.0*  --  11.4* 12.5* 10.6* 11.3*   MCV 84.0  --  85.8 84.9 85.3 84.6   .0  --  516.0* 581.0* 460.0* 475.0*   NE 4.85  --  5.17 7.82* 7.79* 8.53*   LYMABS 0.78*  --  0.97* 1.17 1.01 1.01   INR  --  1.74* 1.43*  --   --   --        Recent Labs   Lab 04/20/25  0513 04/22/25  0538 04/23/25  0638 04/25/25  1642 04/26/25  0759    138 134* 135* 134*   K 4.5 4.5 5.2* 5.0 5.0    106 102 99 102   CO2 24.0 25.0 23.0 27.0 25.0   BUN 18 21 22 48* 50*   CREATSERUM 1.18 1.11 1.28 2.08* 1.78*   CA 9.6 9.5 9.6 10.0 9.9   MG 1.9  --    Please have them add the code- z00 00 --   --   --    * 123* 125* 107* 111*       Recent Labs   Lab 04/23/25  0638 04/26/25  0759   ALT 23 17   AST 33 26   ALB 3.3 3.3       No results for input(s): \"TROP\", \"CK\", \"PBNP\", \"PCT\" in the last 168 hours.    No results for input(s): \"CRP\", \"OLEGARIO\", \"LDH\", \"DDIMER\" in the last 168 hours.    No results for input(s): \"PGLU\" in the last 168 hours.    Meds:   Scheduled Medication:   albumin human  25 g Intravenous Once    pantoprazole  40 mg Oral BID AC    polyethylene glycol (PEG 3350)  17 g Oral BID    methylnaltrexone  6 mg Subcutaneous Q48H    docusate sodium  100 mg Oral BID    rivaroxaban  20 mg Oral Daily with food    [Held by provider] lisinopril  20 mg Oral Daily    tamsulosin  0.4 mg Oral BID    atorvastatin  10 mg Oral Nightly    venlafaxine ER  37.5 mg Oral Nightly     Continuous Infusing Medication:  PRN Medication:  zolpidem    oxyCODONE-acetaminophen **OR** oxyCODONE-acetaminophen    calcium carbonate    ondansetron    simethicone    morphINE **OR** morphINE **OR** [DISCONTINUED] morphINE    tiZANidine    polyethylene glycol (PEG 3350)    bisacodyl    acetaminophen    hydrocortisone       Microbiology:    Hospital Encounter on 04/14/25   1. Blood Culture     Status: None    Collection Time: 04/16/25 12:27 PM    Specimen: Blood,peripheral   Result Value Ref Range    Blood Culture Result No Growth 5 Days N/A   2. Body Fluid Cult Aerobic and Anaerobic     Status: None    Collection Time: 04/15/25 12:02 PM    Specimen: Abdominal fluid; Body fluid, unspecified   Result Value Ref Range    Body Fluid Culture Result No Growth 5 Days N/A    Body Fluid Smear No organisms seen N/A    Body Fluid Smear 2+ WBCs seen N/A   3. Urine Culture, Routine     Status: None    Collection Time: 04/14/25 12:44 PM    Specimen: Urine, cystoscopic; Body fluid, unspecified   Result Value Ref Range    Urine Culture No Growth 2 Days N/A       No results found for: \"COVID19\"     Assessment/Plan:     74 year old male with PMH  including but not limited to HTN, HL, Cholangiocarcinoma, KAREN, GERD who p/t [facility] ED c urinary retention.   Pt has cholangiocarcinoma with peritoneal mets and recent recurrent cholangitis secondary to common hepatic duct stricture extending to right main hepatic duct with placement of 10 fr x 12 cm plastic biliary stent on March 24, 2025. Now here c urinary retention and noted to have ascites and brbpr.      Cholangiocarcinoma with peritoneal mets and recent recurrent cholangitis secondary to common hepatic duct stricture extending to right main hepatic duct with placement of 10 fr x 12 cm plastic biliary stent on 3/24/25  Ascites   BRBPR  Adynamic ileus- CT 4/22  - s/p para 4/15, cytology with metastatic tumor cells  - also completed 5 day course ctx 4/20 for peritonitis based on cell count  - proctozone cream bid prn for bleeding. Bid colace, reslitor to help with bowel motility, now moving bowels  - ascites reaccumulated and pleurex placed 4/22 and 4L removed. Moving forward will need drainage in small amounts and not if feeling LH/dizzy (and presuming improvement in Cr). Trial of removal of 1L today.  - ca 19-9 [id number]    # abhijit- likely 2/2 intravascular depletion in setting of low albumin state/malignancy  - Cr better today after albumin  - cont to trend, obtain orthostatics this afternoon  - ask nephro to see, will need to remove fluid via ascites pleurex drain gingerly  - hold lisinopril  - trying to address odynophagia as this may be contributing to decreased oral intake and volume depletion.     #odynophagia  - described acid reflux/belching, and burning when swallowing  - no visible thrush in OP  - ppi bid started, GI adding carafate.        # Urinary retention  - s/p cystoscopy, (complicated) placement of wilkerson catheter (over wire) 4/14  - Ucx NG <18hrs  - flomax  -Continue with wilkerson catheter to gravity drainage.    - F/U with urology in 1 month for catheter removal or exchange.      #Anemia, chronic  - chronic  disease, malignancy   - monitor      # HL  -statin     # GERD  -PPI, wean as o/p if appropriate     # Major Depression/ Generalized anxiety d/o   -reviewed home meds, continue SSRI currently appears stable      # HTN  - will hold lisinopril, bp now on lower end and has abhijit    Discussed at length with Dr. Akers and pt's daughter and wife at bedside.     Nataliia Hassan MD  [facility]  Pager: [phone]

## 2025-04-26 NOTE — PROGRESS NOTES
[facility]  Progress Note    Bruce R Salus Patient Status:  Inpatient    10/31/1950 MRN [id number]   [facility] 3NW-A Attending Nataliia Hassan MD   Hosp Day # 11 PCP Arlene Bruce MD     Subjective:  Bruce R Salus is a(n) 74 year old male.Pt with low BP yesterday.  Has ascites.  Having BMs.  Now ongoing dysphagia odynophagia.    ROS: No SOB CP    Objective:  Blood pressure 107/56, pulse 73, temperature 97.6 °F (36.4 °C), temperature source Oral, resp. rate 18, height 5' 11\" (1.803 m), weight 200 lb 13.4 oz (91.1 kg), SpO2 94%.  Physical Exam:  GEN: well developed, well nourished, NAD  GI: distended fluid wave RECTAL: Exam not done. EXTREM.: no edema NEURO: A + O      Labs:   Lab Results   Component Value Date    WBC 11.1 2025    HGB 11.3 2025    HCT 35.1 2025    .0 2025    CREATSERUM 1.78 2025    BUN 50 2025     2025    K 5.0 2025     2025    CO2 25.0 2025     2025    CA 9.9 2025    ALB 3.3 2025    ALKPHO 124 2025    BILT 0.3 2025    TP 5.6 2025    AST 26 2025    ALT 17 2025           Assessment:  Problem List[1]     Bruce R Salus is a(n) 74 year old male. Pt with met cholangio, malignant ascites palliative pleurex catheter.  Had hypotension increased creatinine. Still with ascites.    Constipation better. Has dysphagia odynophagia keeping him from eating.        Plan:    1.  Start Carafate 1 GM QAC at bedtime.  2.  Cont BID PPI  3.  If dysphagia persist can consider esophagram.  4.  Poor prognosis, can address TPN depending on progress and family desire for levwl of care    Total time spent on patient care:  30 minutes    Mike Akers MD  2025  10:50 AM         [1]   Patient Active Problem List  Diagnosis    Essential hypertension    KAREN on CPAP    History of kidney stones    Aorto-iliac atherosclerosis    BPH without obstruction/lower urinary  tract symptoms    Elevated prostate specific antigen (PSA)    Moderate episode of recurrent major depressive disorder (HCC)    BMI 28.0-28.9,adult    History of nonmelanoma skin cancer    Urinary retention    Palliative care encounter    Goals of care, counseling/discussion

## 2025-04-26 NOTE — PLAN OF CARE
Problem: Patient/Family Goals  Goal: Patient/Family Long Term Goal  Description: Patient's Long Term Goal: Discharge Home  Interventions: Pain Tolerance, Diet Tolerance, Return to normal ADL's, encourage ambulation  See additional Care Plan goals for specific interventions  Outcome: Progressing  Goal: Patient/Family Short Term Goal  Description: Patient's Short Term Goal: Prepare to Discharge Home  Interventions: Transition from IV to oral pain medications, Advance diet as tolerated, encourage ambulation,   See additional Care Plan goals for specific interventions  Outcome: Progressing     Problem: PAIN - ADULT  Goal: Verbalizes/displays adequate comfort level or patient's stated pain goal  Description: INTERVENTIONS:- Encourage pt to monitor pain and request assistance- Assess pain using appropriate pain scale- Administer analgesics based on type and severity of pain and evaluate response- Implement non-pharmacological measures as appropriate and evaluate response- Consider cultural and social influences on pain and pain management- Manage/alleviate anxiety- Utilize distraction and/or relaxation techniques- Monitor for opioid side effects- Notify MD/LIP if interventions unsuccessful or patient reports new pain- Anticipate increased pain with activity and pre-medicate as appropriate  Outcome: Progressing     Problem: RISK FOR INFECTION - ADULT  Goal: Absence of fever/infection during anticipated neutropenic period  Description: INTERVENTIONS- Monitor WBC- Administer growth factors as ordered- Implement neutropenic guidelines  Outcome: Progressing     Problem: SAFETY ADULT - FALL  Goal: Free from fall injury  Description: INTERVENTIONS:- Assess pt frequently for physical needs- Identify cognitive and physical deficits and behaviors that affect risk of falls.- [place] fall precautions as indicated by assessment.- Educate pt/family on patient safety including physical limitations- Instruct pt to call for assistance  with activity based on assessment- Modify environment to reduce risk of injury- Provide assistive devices as appropriate- Consider OT/PT consult to assist with strengthening/mobility- Encourage toileting schedule  Outcome: Progressing

## 2025-04-26 NOTE — PROGRESS NOTES
[facility]  Progress Note    Bruce R Salus Patient Status:  Inpatient    10/31/1950 MRN [id number]   Location [facility] 3NW-A Attending Scarlett Saenz*   Hosp Day # 11 PCP Arlene Bruce MD     Subjective:  Patient was supposed to be discharged yesterday, however, he was not discharged due to hypotension and dizziness.  His serum creatinine had gone up to 2.08.  His abdomen is a bit more distended today.  His wife is at bedside.  His daughter is on the phone.    Objective:  Blood pressure 102/57, pulse 73, temperature 97.8 °F (36.6 °C), temperature source Oral, resp. rate 18, height 5' 11\" (1.803 m), weight 200 lb 13.4 oz (91.1 kg), SpO2 94%.    Gen: lying in bed, appears fatigued  HEENT: normocephalic  CV: regular rate  Lungs: CTA   Abd: moderately distended, mild TTP  Extrem: no BLE edema  Skin: No rash  Neuro: AOx3    Labs:   Lab Results   Component Value Date    WBC 11.1 2025    HGB 11.3 2025    HCT 35.1 2025    .0 2025    CREATSERUM 1.78 2025    BUN 50 2025     2025    K 5.0 2025     2025    CO2 25.0 2025     2025    CA 9.9 2025    ALB 3.3 2025    ALKPHO 124 2025    BILT 0.3 2025    TP 5.6 2025    AST 26 2025    ALT 17 2025       Lab Results   Component Value Date    WBC 11.1 (H) 2025    HGB 11.3 (L) 2025    HCT 35.1 (L) 2025    .0 (H) 2025    NEPERCENT 76.9 2025    LYPERCENT 9.1 2025    MOPERCENT 10.3 2025    EOPERCENT 1.3 2025    BAPERCENT 0.6 2025    NE 8.53 (H) 2025    LYMABS 1.01 2025    MOABSO 1.14 (H) 2025    EOABSO 0.14 2025    BAABSO 0.07 2025           Assessment and Plan:  Metastatic cholangiocarcinoma  - currently on FOLFIRI  - no current plans for intpt chemo  - had extensive d/w pt and daughter that at this time we will try to get him improved  from his acute issues, so that he can follow-up with Dr. Cassidy for cancer management  - CA 19-9: 1568  - Discussed that the CA 19-9 has gone up from outpatient lab.  However these are 2 different labs and ranges can be different.  Also levels could be affected by acute issues going on in the hospital.  - follow-up Dr. Cassidy on discharge    Recurrent ascites  - s/p abdominal pleurx 4/22  - Recommended to drain maximum of 2 L at home at one time via home health RN or family. Do NOT drain if SBP low and/or feeling lightheaded/dizzy.  Spent extensive time discussing this with patient and family today.  - Nurse to drain 1 L from abdominal Pleurx today and see how he does.  Potentially drain more later if BP tolerates and abdomen still distended.    Anemia  - Hgb stable  - cont to monitor    CVC-associated DVT  - cont Xarelto 20mg daily     Ileus  - CT abd/pelvis 4/22: possibly adynamic ileus  - GI consulted - recommend PT  - advised pt and daughter to discuss bowel regimen with GI  - pain mgmt per primary    Coagulopathy  - INR elevated  - s/p Vitamin K 5mg PO x1     Hypotension  - Likely due to third spacing   - Can give IV albumin as needed, especially after ascites drainage    LOREN  - SCr increased to 2.08 yesterday  - Likely due to renal hypoperfusion from hypotension  - IV albumin was given  - Continue to monitor    Thank you for allowing me to participate in the care of this patient.  Patient can be discharged from oncology standpoint.  Follow-up with Dr. Cassidy on discharge.    Natalia Diehl MD  [facility]  Department of Oncology and Hematology

## 2025-04-26 NOTE — PROGRESS NOTES
Patient is alert and oriented x 4.   Vital signs stable. Afebrile. On room air with . NSR on tele. Patient denies dizziness/lightheadedness.  Tolerating diet well but poor appetite/oral intake.   Patient c/o abdominal pain. Analgesics given with good relief.  Calvo Catheter is patent; draining clear yellow urine, decreased urine output.  PleurRx Catheter dressing C/D/I.  Medications were administered per MAR.  Ambulating  with 1 assist and walker.   Plan of care discussed with patient and wife at bedside; no further questions at this time.   All appropriate safety measures in place. Call light in reach.

## 2025-04-27 LAB
ALBUMIN SERPL-MCNC: 3.7 G/DL (ref 3.2–4.8)
ANION GAP SERPL CALC-SCNC: 10 MMOL/L (ref 0–18)
BUN BLD-MCNC: 47 MG/DL (ref 9–23)
CALCIUM BLD-MCNC: 10.5 MG/DL (ref 8.7–10.6)
CHLORIDE SERPL-SCNC: 100 MMOL/L (ref 98–112)
CO2 SERPL-SCNC: 25 MMOL/L (ref 21–32)
CREAT BLD-MCNC: 1.78 MG/DL (ref 0.7–1.3)
EGFRCR SERPLBLD CKD-EPI 2021: 39 ML/MIN/1.73M2 (ref 60–?)
ERYTHROCYTE [DISTWIDTH] IN BLOOD BY AUTOMATED COUNT: 16 %
GLUCOSE BLD-MCNC: 115 MG/DL (ref 70–99)
HCT VFR BLD AUTO: 38.6 % (ref 39–53)
HGB BLD-MCNC: 12.4 G/DL (ref 13–17.5)
MAGNESIUM SERPL-MCNC: 2.3 MG/DL (ref 1.6–2.6)
MCH RBC QN AUTO: 27.4 PG (ref 26–34)
MCHC RBC AUTO-ENTMCNC: 32.1 G/DL (ref 31–37)
MCV RBC AUTO: 85.2 FL (ref 80–100)
OSMOLALITY SERPL CALC.SUM OF ELEC: 293 MOSM/KG (ref 275–295)
PHOSPHATE SERPL-MCNC: 3.8 MG/DL (ref 2.4–5.1)
PLATELET # BLD AUTO: 557 10(3)UL (ref 150–450)
POTASSIUM SERPL-SCNC: 5 MMOL/L (ref 3.5–5.1)
RBC # BLD AUTO: 4.53 X10(6)UL (ref 3.8–5.8)
SODIUM SERPL-SCNC: 135 MMOL/L (ref 136–145)
WBC # BLD AUTO: 11.8 X10(3) UL (ref 4–11)

## 2025-04-27 RX ORDER — HYDROMORPHONE HYDROCHLORIDE 1 MG/ML
0.4 INJECTION, SOLUTION INTRAMUSCULAR; INTRAVENOUS; SUBCUTANEOUS EVERY 2 HOUR PRN
Status: DISCONTINUED | OUTPATIENT
Start: 2025-04-27 | End: 2025-04-28

## 2025-04-27 RX ORDER — CHLORPROMAZINE HYDROCHLORIDE 25 MG/ML
25 INJECTION INTRAMUSCULAR ONCE
Status: DISCONTINUED | OUTPATIENT
Start: 2025-04-27 | End: 2025-04-27

## 2025-04-27 RX ORDER — HYDROMORPHONE HYDROCHLORIDE 1 MG/ML
0.2 INJECTION, SOLUTION INTRAMUSCULAR; INTRAVENOUS; SUBCUTANEOUS EVERY 2 HOUR PRN
Status: DISCONTINUED | OUTPATIENT
Start: 2025-04-27 | End: 2025-04-28

## 2025-04-27 RX ORDER — ALBUMIN (HUMAN) 12.5 G/50ML
25 SOLUTION INTRAVENOUS ONCE
Status: COMPLETED | OUTPATIENT
Start: 2025-04-27 | End: 2025-04-27

## 2025-04-27 RX ORDER — ALBUMIN (HUMAN) 12.5 G/50ML
25 SOLUTION INTRAVENOUS ONCE
Status: DISCONTINUED | OUTPATIENT
Start: 2025-04-27 | End: 2025-04-27

## 2025-04-27 RX ORDER — OXYCODONE HCL 5 MG/5 ML
5 SOLUTION, ORAL ORAL EVERY 4 HOURS PRN
Status: DISCONTINUED | OUTPATIENT
Start: 2025-04-27 | End: 2025-04-28

## 2025-04-27 RX ORDER — HYDROMORPHONE HYDROCHLORIDE 1 MG/ML
0.8 INJECTION, SOLUTION INTRAMUSCULAR; INTRAVENOUS; SUBCUTANEOUS EVERY 2 HOUR PRN
Status: DISCONTINUED | OUTPATIENT
Start: 2025-04-27 | End: 2025-04-28

## 2025-04-27 NOTE — SPIRITUAL CARE NOTE
Spiritual Care Visit Note    Patient Name: Bruce R Salus Date of Spiritual Care Visit: 25   : 10/31/1950 Primary Dx: Urinary retention       Referred By: Referral From: Nurse    Spiritual Care Taxonomy:    Intended Effects: Aligning care plan with patient's values    Methods: Collaborate with care team member    Interventions: Acknowledge current situation    Visit Type/Summary:     - PoA: Other: At time of visit, family at patient's bedside and patient receiving medical attention.  will follow up later.    Spiritual Care support can be requested via an [facility] consult. For urgent/immediate needs, please contact the On Call  at: [place]: ext [phone]    Rev Rain Ventura MA

## 2025-04-27 NOTE — PROGRESS NOTES
[facility] Hospitalist note    PCP: Arlene Bruce MD    Chief Complaint:  F/u ascites, dizziness    SUBJECTIVE:  Discussed with pt's son who was visiting from [place], pt's wife, and pt's daughter Estelle over phone- discussed that with the myriad of issues going on, his body seems to be shutting down and not much more can be done. Family in agreement with hospice.   Then came to bedside and discussed with pt and his family again this afternoon- pt ultimately stated that he wanted to be home and just wanted to be comfortable.   Also discussed with Dr. Diehl, Dr. Goins, Dr. Akers  Pt uncomfortable, feels too distended    OBJECTIVE:  Temp:  [97.6 °F (36.4 °C)-98 °F (36.7 °C)] 98 °F (36.7 °C)  Pulse:  [] 79  Resp:  [16-18] 18  BP: ()/(48-63) 122/58  SpO2:  [92 %-95 %] 92 %    Intake/Output:    Intake/Output Summary (Last 24 hours) at 4/27/2025 0915  Last data filed at 4/27/2025 0508  Gross per 24 hour   Intake 900 ml   Output 1565 ml   Net -665 ml       Last 3 Weights   04/17/25 0751 200 lb 13.4 oz (91.1 kg)   04/16/25 0622 200 lb 14.4 oz (91.1 kg)   04/14/25 0636 200 lb (90.7 kg)   04/14/25 0625 200 lb (90.7 kg)   03/24/25 0803 202 lb (91.6 kg)   03/11/25 1712 202 lb (91.6 kg)   02/24/25 1041 205 lb (93 kg)   02/12/25 1050 205 lb (93 kg)       Exam  Gen: intermittent grimacing  HEENT: anicteric sclera, MMM  Pulm: Lungs clear, normal respiratory effort  CV: Heart with regular rate and rhythm  Abd: Abdomen distended, pleurex in place  MSK: Full range of motion in extremities, no clubbing, no cyanosis  Skin: no rashes or lesions  Neuro: somnolent at times, then becomes awake and discusses his care    Data Review:         Labs:     Recent Labs   Lab 04/21/25  0830 04/22/25  0538 04/23/25  0638 04/25/25  1642 04/26/25  0759 04/27/25  0544   WBC  --  7.6 10.8 10.4 11.1* 11.8*   HGB  --  11.4* 12.5* 10.6* 11.3* 12.4*   MCV  --  85.8 84.9 85.3 84.6 85.2   PLT  --  516.0* 581.0* 460.0* 475.0* 557.0*   NE   --  5.17 7.82* 7.79* 8.53*  --    LYMABS  --  0.97* 1.17 1.01 1.01  --    INR 1.74* 1.43*  --   --   --   --        Recent Labs   Lab 04/22/25  0538 04/23/25  0638 04/25/25  1642 04/26/25  0759 04/27/25  0544    134* 135* 134* 135*   K 4.5 5.2* 5.0 5.0 5.0    102 99 102 100   CO2 25.0 23.0 27.0 25.0 25.0   BUN 21 22 48* 50* 47*   CREATSERUM 1.11 1.28 2.08* 1.78* 1.78*   CA 9.5 9.6 10.0 9.9 10.5   MG  --   --   --   --  2.3   PHOS  --   --   --   --  3.8   * 125* 107* 111* 115*       Recent Labs   Lab 04/23/25  0638 04/26/25  0759 04/27/25  0544   ALT 23 17  --    AST 33 26  --    ALB 3.3 3.3 3.7       No results for input(s): \"TROP\", \"CK\", \"PBNP\", \"PCT\" in the last 168 hours.    No results for input(s): \"CRP\", \"OLEGARIO\", \"LDH\", \"DDIMER\" in the last 168 hours.    No results for input(s): \"PGLU\" in the last 168 hours.    Meds:   Scheduled Medication:   albumin human  25 g Intravenous Once    sodium chloride  500 mL Intravenous Once    sucralfate  1 g Oral TID AC and HS (2200)    albumin human  25 g Intravenous Once    pantoprazole  40 mg Oral BID AC    polyethylene glycol (PEG 3350)  17 g Oral BID    methylnaltrexone  6 mg Subcutaneous Q48H    docusate sodium  100 mg Oral BID    rivaroxaban  20 mg Oral Daily with food    [Held by provider] lisinopril  20 mg Oral Daily    tamsulosin  0.4 mg Oral BID    atorvastatin  10 mg Oral Nightly    venlafaxine ER  37.5 mg Oral Nightly     Continuous Infusing Medication:  PRN Medication:  zolpidem    oxyCODONE-acetaminophen **OR** oxyCODONE-acetaminophen    calcium carbonate    ondansetron    simethicone    morphINE **OR** morphINE **OR** [DISCONTINUED] morphINE    tiZANidine    polyethylene glycol (PEG 3350)    bisacodyl    acetaminophen    hydrocortisone       Microbiology:    Hospital Encounter on 04/14/25   1. Blood Culture     Status: None    Collection Time: 04/16/25 12:27 PM    Specimen: Blood,peripheral   Result Value Ref Range    Blood Culture Result No  Growth 5 Days N/A   2. Body Fluid Cult Aerobic and Anaerobic     Status: None    Collection Time: 04/15/25 12:02 PM    Specimen: Abdominal fluid; Body fluid, unspecified   Result Value Ref Range    Body Fluid Culture Result No Growth 5 Days N/A    Body Fluid Smear No organisms seen N/A    Body Fluid Smear 2+ WBCs seen N/A   3. Urine Culture, Routine     Status: None    Collection Time: 04/14/25 12:44 PM    Specimen: Urine, cystoscopic; Body fluid, unspecified   Result Value Ref Range    Urine Culture No Growth 2 Days N/A       No results found for: \"COVID19\"     Assessment/Plan:     74 year old male with PMH including but not limited to HTN, HL, Cholangiocarcinoma, KAREN, GERD who p/t [facility] ED c urinary retention.   Pt has cholangiocarcinoma with peritoneal mets and recent recurrent cholangitis secondary to common hepatic duct stricture extending to right main hepatic duct with placement of 10 fr x 12 cm plastic biliary stent on March 24, 2025. Now here c urinary retention and noted to have ascites and brbpr.      Cholangiocarcinoma with peritoneal mets and recent recurrent cholangitis secondary to common hepatic duct stricture extending to right main hepatic duct with placement of 10 fr x 12 cm plastic biliary stent on 3/24/25  Ascites   BRBPR  Adynamic ileus- CT 4/22  - s/p para 4/15, cytology with metastatic tumor cells  - also completed 5 day course ctx 4/20 for peritonitis based on cell count  - proctozone cream bid prn for bleeding. Bid colace, reslitor to help with bowel motility, now moving bowels  - ascites reaccumulated and pleurex placed 4/22 and 4L removed. Additional 1L amounts removed yesterday and today.   - ca 19-9 1568    # abhijit- likely 2/2 intravascular depletion in setting of low albumin state/malignancy  - Cr about the same  - still orthostatic  - nephro following  - trial of IVF and further albumin has been done but ultimately these as well as minimizing ascites drainage is affecting his qol and  comfort.   - hold lisinopril  - trying to address odynophagia as this may be contributing to decreased oral intake and volume depletion.     #odynophagia  - described acid reflux/belching, and burning when swallowing  - no visible thrush in OP  - ppi bid started, GI added carafate.        # Urinary retention  - s/p cystoscopy, (complicated) placement of wilkerson catheter (over wire) 4/14  - Ucx NG <18hrs  - flomax  -Continue with wilkerson catheter to gravity drainage.    - F/U with urology in 1 month for catheter removal or exchange.      #Anemia, chronic  - chronic disease, malignancy   - monitor      # HL  -statin     # GERD  -PPI     # Major Depression/ Generalized anxiety d/o   -reviewed home meds, continue SSRI currently appears stable      # HTN  - will hold lisinopril, bp now on lower end and has abhijit    Lengthy discussions this morning and today with consultants, family, pt. All are in agreement that hospice seems appropriate. Hospice consulted, hoping to arrange for discharge home tomorrow. Discussed with family that also changing code status to DNR would be appropriate- they understand and agree with this as well, will discuss further with hospice when they meet.    Advanced care planning undertaken from from  am and again later from 130-205 pm      Nataliia Hassan MD  [facility] Hospitalist  Pager: [phone]

## 2025-04-27 NOTE — PROGRESS NOTES
Assumed care of patient at 1530. Patient A&Ox4, RA, up w/ SB assist and walker. Calvo intact w/ low urine output. Abdomen distended. Pleurx drain intact w/ occlusive dressing in place. Patient reporting abd pain. Receiving IV morphine prn and percocet prn. Saline locked. Needs US kidney. Plan of care discussed w/ patient and family at bedside.

## 2025-04-27 NOTE — PROGRESS NOTES
[facility]   part of [facility]    Nephrology Progress Note    Bruce R Salus Attending:  Nataliia Hassan MD     Cc: LOREN    SUBJECTIVE     Po intake remains poor. Orthostatics this am. No v/d. Denies sob   Abdomen is distended    PHYSICAL EXAM   Vital signs: /67 (BP Location: Right arm)   Pulse 85   Temp 98 °F (36.7 °C) (Oral)   Resp 18   Ht 5' 11\" (1.803 m)   Wt 200 lb 13.4 oz (91.1 kg)   SpO2 94%   BMI 28.01 kg/m²   Temp (24hrs), Av.9 °F (36.6 °C), Min:97.6 °F (36.4 °C), Max:98 °F (36.7 °C)       Intake/Output Summary (Last 24 hours) at 2025 1401  Last data filed at 2025 1200  Gross per 24 hour   Intake 600 ml   Output 1440 ml   Net -840 ml     Wt Readings from Last 3 Encounters:   25 200 lb 13.4 oz (91.1 kg)   25 202 lb (91.6 kg)   25 205 lb (93 kg)     General: NAD, frail  HEENT: NCAT, EOMI, MMM  Neck: Supple   Cardiac: Regular rate and rhythm   Lungs: CTAB  Abdomen: +distended   Extremities: + edema  Neurologic: No asterxis  Skin: Warm and dry, no rashes     MEDS   Current Hospital Medications[1]    LABS     Lab Results   Component Value Date    WBC 11.8 2025    HGB 12.4 2025    HCT 38.6 2025    .0 2025    CREATSERUM 1.78 2025    BUN 47 2025     2025    K 5.0 2025     2025    CO2 25.0 2025     2025    CA 10.5 2025    ALB 3.7 2025    MG 2.3 2025    PHOS 3.8 2025       IMAGING   All imaging studies personally reviewed.    CT ABDOMEN+PELVIS(CONTRAST ONLY)(CPT=74177)   Final Result   PROCEDURE:  CT ABDOMEN+PELVIS (CONTRAST ONLY) (CPT=74177)       COMPARISON:  [facility] , US, US PARACENTESIS W IMAGING (CPT=49083),    2025, 3:48 PM.  [facility] , XS, IR PLEUREX CATHETER DRAINAGE INSERTION,    2025, 7:36 AM.  [facility] , CT, CT ABDOMEN+PELVIS(CONTRAST    ONLY)(CPT=74177), 2025, 4:30 PM.       INDICATIONS:  abd distension/bloating, rule out  ileus/SBO       TECHNIQUE:  CT scanning was performed from the dome of the diaphragm to    the pubic symphysis with non-ionic intravenous contrast material. Post    contrast coronal MPR imaging was performed.  Dose reduction techniques    were used. Dose information is    transmitted to the ACR (American College of Radiology) NRDR (National    Radiology Data Registry) which includes the Dose Index Registry.       PATIENT STATED HISTORY:(As transcribed by Technologist)  Patient is here    for abdominal distention.        CONTRAST USED:  100cc of Isovue 370       FINDINGS:     LIVER:  Stable.  Segmental obstruction and or infiltrating tumor in the    lateral segment left lobe.   BILIARY:  Stable.  Surgically absent gallbladder.  Central endo biliary    stenting.  No new intrahepatic dilatation.   PANCREAS:  Stable.  Uniform parenchyma.  No ductal dilatation.   SPLEEN:  Stable.  Not enlarged.   KIDNEYS:  Stable.  Normal anatomic positions.  No hydronephrosis or new    perinephric stranding.  Numerous circumscribed low-attenuation foci are    seen consistent with cortical and parapelvic cysts.  A single    nonobstructing calculus is seen in each kidney.     The larger is on the left measuring 0.8 cm.    ADRENALS:  Stable.  Not enlarged.   AORTA/VASCULAR:  Stable.  Moderate calcified atherosclerosis.  No    abdominal aortic aneurysm.  Patent celiac artery, SMA and SHANNA.  Patent    splenic vein and right portal vein.  The left portal vein is not    definitively visualized/opacified, unchanged.   RETROPERITONEUM:  Stable.  No adenopathy.   BOWEL/MESENTERY:  There is fluid distension of small bowel.  Diameter    measured up to 3.1 cm.  There is also air and stool distension of colon,    with cecum measured at 8.0 cm.     There is a small amount of nonspecific ascites, markedly decreased    compared to the prior.  Increased attenuation and nodularity of the    omentum is unchanged consistent with carcinomatosis.  Dominant  area is    left upper quadrant.  This is measured at 5.7    x 4.1 cm, prior 5.7 x 4.3 cm.     ABDOMINAL WALL:  Right-sided PleurX catheter is noted.   URINARY BLADDER:  A Calvo catheter is present.  Nondistended bladder with    smooth contour.   PELVIC NODES:  Stable.  Not enlarged.   PELVIC ORGANS:  Stable.  Moderate prostatomegaly.   BONES:  Stable.  Moderate to severe degenerative changes.  Maintained    vertebral body heights.  No subluxation.   LUNG BASES:  There is a trace left pleural effusion.  There is mild    airspace disease, unchanged.   OTHER:  None.                                 =====   CONCLUSION:     1. There is fluid distension of small bowel with air and stool distension    of colon.  This may reflect adynamic ileus.   2. Interval placement of a PleurX catheter.  Marked decrease in ascites.     Omental changes consistent with carcinomatosis appear similar to the    prior.   3. Resolution of small right pleural effusion.  Decrease in small left    pleural effusion.  Stable basilar airspace disease.  This may be    atelectasis.  The potential for pneumonia should be correlated with    clinical suspicion for infection.   4. Details as above.  Continued clinical correlation recommended.             LOCATION:  [place]           Dictated by (CST): Adán Momin MD on 4/22/2025 at 3:42 PM        Finalized by (CST): Adán Momin MD on 4/22/2025 at 3:53 PM         IR PLEUREX CATHETER   Final Result   PROCEDURE:  IR PLEUREX CATHETER DRAINAGE INSERTION       INDICATIONS:  recurrent ascites       TECHNIQUE:    Following a discussion of the procedure with the patient    and/or family, including its risks, benefits, alternatives, and steps to    prevent infection, a witnessed verbal and signed consent was obtained and    documented in the patient's chart.     All operators present for the case performed standard pre-procedural prep    including hand washing, sterile gloves, gown, mask, and cap. All aspects    of  the maximum sterile barrier technique were followed. A preprocedural    time out was performed with all    physicians, technologists, and nurses involved with the procedure.       COMPARISON:  None.       Prior to the procedure, I discussed with the patient and/or legal    representative the potential benefits, risks, and side effects of this    procedure, the likelihood of the patient achieving goals; and the    potential problems that might occur during    recuperation.  I discussed reasonable alternatives to the procedure,    including risks, benefits, steps to prevent infection and side effects    related to the alternatives, and risks related to not receiving this    procedure. A witnessed verbal and signed    consent was obtained and documented in the patient's chart.    IV was checked and maintained by the nurse. Moderate conscious sedation    was performed under continuous pulse oximetry and cardiac monitoring under    my direct supervision.  The radiology nurse was in attendance throughout    the exam. Moderate conscious    sedation of 2 mg Versed and 100 mcg of Fentanyl was given.  Patient was    assessed and monitoring of oxygen saturation, heart rate, and blood    pressure by the nursing staff and myself during the exam for a total    intraservice time of 13 minutes of conscious    sedation time from 810 to 823.  Two grams of Ancef were given    pre-procedurally via existing IV.       The patient was placed supine on the angiographic table and the right    abdomen was prepped and covered with a full body drape in the usual    sterile fashion.  All operators present for the case performed standard    pre-procedural prep including hand washing,    sterile gloves, gown, mask, and cap.  All aspects of the maximum sterile    barrier technique were followed.       A preprocedural time out was performed with all physicians, technologists,    and nurses involved with the procedure.       Sonography of the  abdomen demonstrated fluid and a permanent image was    obtained. Under sonographic guidance, the fluid was accessed using a    micropuncture system.  A guidewire was advanced into the fluid under    fluoroscopic guidance.  Using blunt    dissection a tunneled catheter was placed via a peel-away sheath.  The    catheter was secured.  Sterile gauze and transparent dressing was applied.   The patient tolerated the procedure well and there were no immediate    complications.  The patient was escorted to the holding area for routine    observation.  Routine post pleurx catheter insertion instructions were    communicated to the patient.   ESTIMATED BLOOD LOSS: Less than 5cc.   FLUOROSCOPY TIME/DOSE:  0.1  minutes   AIR KERMA:  5.81 mGy                         =====   CONCLUSION: Successful placement of abdominal Pleurx catheter.           LOCATION:  [facility]                        Dictated by (CST): Smooth Quezada MD on 4/22/2025 at 8:48 AM        Finalized by (CST): Smooth Quezada MD on 4/22/2025 at 8:49 AM         XR ABDOMEN (1 VIEW) (CPT=74018)   Final Result   PROCEDURE:  XR ABDOMEN (1 VIEW) (CPT=74018)       INDICATIONS:  distention, ileus evaluation       COMPARISON:  [facility] , CT, CT ABDOMEN+PELVIS(CONTRAST ONLY)(CPT=74177),    4/16/2025, 4:30 PM.       TECHNIQUE:  Supine AP view was obtained.       PATIENT STATED HISTORY: (As transcribed by Technologist)  Patient offered    no additional history at this time.                 FINDINGS:     BOWEL GAS PATTERN:  Air-filled distention of bowel in the abdomen and    pelvis is noted.  Likely small bowel in the right upper quadrant measures    up to 6.9 cm in diameter.   CALCIFICATIONS:  None significant.   OTHER:  Common bile duct stent is noted.                         =====   CONCLUSION:  Air-filled distention of multiple loops of bowel in the    abdomen and pelvis are noted.  This may be related to an ileus.  There is    a moderately distended loop of small bowel in  the right upper quadrant    that is new when compared to prior CT    examination.  Possibility of a developing bowel obstruction related to    this loop cannot be excluded.           LOCATION:  [facility]           Dictated by (CST): Ceasar Workman MD on 4/20/2025 at 9:46 AM        Finalized by (CST): Ceasar Workman MD on 4/20/2025 at 9:46 AM         US PARACENTESIS W IMAGING (CPT=49083)   Final Result   PROCEDURE:  US PARACENTESIS W IMAGING  (CPT=49083)       COMPARISON:  None.       INDICATIONS:  ascities       DESCRIPTION OF PROCEDURE:  Informed consent was obtained.  The patient was    prepped and draped in the standard sterile fashion.  An ultrasound-guided    paracentesis was performed in the usual sterile manner.       PATIENT STATED HISTORY: (As transcribed by Technologist)              LOCATION:  Right lower quadrant   FLUID REMOVED:  5.0 L of ascites removed.   MEDICATION:  10 cc of 1% Lidocaine for local anesthetic.   COMPLICATIONS:  None.                         =====   CONCLUSION:  Ultrasound-guided paracentesis was performed without    complication.           LOCATION:  [facility]                   Dictated by (CST): Kelvin Trinh DO on 4/17/2025 at 5:21 PM        Finalized by (CST): Kelvin Trinh DO on 4/17/2025 at 5:22 PM         CT ABDOMEN+PELVIS(CONTRAST ONLY)(CPT=74177)   Final Result   PROCEDURE:  CT ABDOMEN+PELVIS (CONTRAST ONLY) (CPT=74177)       COMPARISON:  [facility] , CT, CT ABDOMEN+PELVIS(CONTRAST ONLY)(CPT=74177),    7/11/2024, 2:21 PM.       INDICATIONS:  assess progression ca       TECHNIQUE:  CT scanning was performed from the dome of the diaphragm to    the pubic symphysis with non-ionic intravenous contrast material. Post    contrast coronal MPR imaging was performed.  Dose reduction techniques    were used. Dose information is    transmitted to the ACR (American College of Radiology) NRDR (National    Radiology Data Registry) which includes the Dose Index Registry.       PATIENT STATED  HISTORY:(As transcribed by Technologist)  Patient presents    for evaluation of ascites, progression of Cancer. Pain to lower    extremeties.         CONTRAST USED:  100cc of Isovue 370   FINDINGS:     LUNG BASE:  Small bilateral pleural effusions with bibasilar atelectasis..   LIVER:  Shrunken appearance with heterogeneous enhancement in biliary    ductal prominence involving the left lobe of the liver.  This is not    significantly changed since 7/11/2024 compatible with the patient's    diagnosis of cholangiocarcinoma.     BILIARY:  Common bile duct stent in place.     PANCREAS:  Pancreatic atrophy.   SPLEEN:  Normal caliber.   KIDNEYS:  Stable bilateral cortical cysts and parapelvic cysts.  Bilateral    nonobstructing nephrolithiasis.     ADRENALS:  Normal.   AORTA/VASCULAR:  No aneurysm.  Tortuous abdominal aorta.  Aortoiliac    calcification.   RETROPERITONEUM:  No enlarged adenopathy.   BOWEL/MESENTERY:  There is a large degree of abdominal and pelvic ascites.     There is omental carcinomatosis which has developed measuring    approximately 5.7 x 4.3 cm and 8.6 x 3.5 cm in the left upper quadrant.     Colonic diverticulosis.  No evidence of    bowel obstruction.   ABDOMINAL WALL:  Small fat containing umbilical hernia.     PELVIC ORGANS:  There is a Calvo catheter place.  The urinary bladder is    completely decompressed.     BONES:  Multilevel endplate hypertrophic changes throughout the thoracic    and lumbar spine with degenerative disc disease lower lumbar spine.  No    obvious lytic destructive process.                          =====   CONCLUSION:  Interval development of omental carcinomatosis with large    degree of abdominal and pelvic ascites when compared to 7/11/2024 CT scan.       There are now small bilateral pleural effusions with bibasilar    atelectasis.       There is a heterogeneous infiltrating process in the left lobe of the    liver with biliary ductal prominence without significant  change.       Common bile duct stent in place.  Pancreatic ductal stent is no longer    appreciated.       Stable CT appearance of the kidneys with bilateral cysts and    nonobstructing calculi.           LOCATION:  [facility]           Dictated by (CST): Cally Frye MD on 4/16/2025 at 4:48 PM        Finalized by (CST): Cally Frye MD on 4/16/2025 at 4:59 PM         US PARACENTESIS W IMAGING (CPT=49083)   Final Result   PROCEDURE:  US PARACENTESIS W IMAGING  (CPT=49083)       COMPARISON:  [facility] , US, US ABDOMEN LIMITED (CPT=76705), 4/14/2025, 2:50    PM.       INDICATIONS:  Ascites       DESCRIPTION OF PROCEDURE:  Informed consent was obtained.  The patient was    prepped and draped in the standard sterile fashion.  An ultrasound-guided    paracentesis was performed in the usual sterile manner.       PATIENT STATED HISTORY: (As transcribed by Technologist)              LOCATION:  Right lower quadrant   FLUID REMOVED:  4000 cc   MEDICATION:  Three cc of 1% Lidocaine for local anesthetic.   COMPLICATIONS:  None.   LABORATORY:  Pending                         =====   CONCLUSION:  Ultrasound-guided paracentesis was performed without    complication.           LOCATION:  [facility]                   Dictated by (CST): Smooth Purcell MD on 4/15/2025 at 2:34 PM        Finalized by (CST): Smooth Purcell MD on 4/15/2025 at 2:35 PM         US ABDOMEN LIMITED (CPT=76705)   Final Result   PROCEDURE:  US ABDOMEN LIMITED (CPT=76705)       COMPARISON:  None.       INDICATIONS:  Abdominal distension rule out ascites       PATIENT STATED HISTORY: (As transcribed by Technologist)              FINDINGS:  There is ascites noted in all 4 quadrants of the abdomen    moderate in degree.                           =====   CONCLUSION:  Moderate degree of ascites.           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 Sp cysto w wilkerson. Also sp pleurx 4/22. Now w hypotension and LOREN.      LOREN   -- baseline Cr ~1.0-1.2mg/dL.  Cr increased to 2.08 on 4/25 --> 1.78 today  -- LOREN 2/2 contrast inducted nephropathy (sp contrast on 4/22/25. Cr bumped about 72hrs later which fits time course) +/-  due to decreased intravascular volume status w third spacing + hypotension   -- hold lisinopril. Give albumin prn. Encourage po intake.   -- UA w hyaline casts, RBCs, trace protein, trace ketones  Defer if needs abx to primary team   -- urine Na 14 c/w prerenal   -- urine eos neg  -- avoid long term prolonged use of carafate if renal insufficiency continues  -- avoid morphine, use dilaudid instead  -- avoid nephrotoxins and renally dose meds   -- strict UOP     Metastatic cholangiocarcinoma w Recurrent ascites   -- sp pleurx 4/22. Per oncology.      CVC asscoiated DVT  -- on xeralto, monitor and change if needed prn renal function      Illeus / dysphagia / odynophagia   -- per GI     Hypotension   -- suspected due to third spacing. IV albumin prn hernando after ascites drain and limit drain. Hold lisinopril        D/w Dr Hassan and Dr Diehl    Thank you for allowing me to participate in the care of this patient. Please do not hesitate to call with questions or concerns.       Nette Goins MD  [facility]         [1]   Current Facility-Administered Medications   Medication Dose Route Frequency    sodium chloride 0.9 % IV bolus 500 mL  500 mL Intravenous Once    albumin human (Albumin) 25% injection 25 g  25 g Intravenous Once    sucralfate (Carafate) 1 GM/10ML oral suspension 1 g  1 g  Oral TID AC and HS (2200)    pantoprazole (Protonix) DR tab 40 mg  40 mg Oral BID AC    zolpidem (Ambien) tab 10 mg  10 mg Oral Nightly PRN    oxyCODONE-acetaminophen (Percocet) 5-325 MG per tab 1 tablet  1 tablet Oral Q4H PRN    Or    oxyCODONE-acetaminophen (Percocet) 5-325 MG per tab 2 tablet  2 tablet Oral Q4H PRN    calcium carbonate (Tums) chewable tab 1,000 mg  1,000 mg Oral Q6H PRN    polyethylene glycol (PEG 3350) (Miralax) 17 g oral packet 17 g  17 g Oral BID    methylnaltrexone (Relistor) 12 mg/0.6mL SUBQ injection 6 mg  6 mg Subcutaneous Q48H    ondansetron (Zofran) 4 MG/2ML injection 4 mg  4 mg Intravenous Q6H PRN    simethicone (Mylicon) chewable tab 80 mg  80 mg Oral QID PRN    morphINE PF 2 MG/ML injection 1 mg  1 mg Intravenous Q2H PRN    Or    morphINE PF 2 MG/ML injection 2 mg  2 mg Intravenous Q2H PRN    tiZANidine (Zanaflex) partial tab 1 mg  1 mg Oral Q6H PRN    docusate sodium (Colace) cap 100 mg  100 mg Oral BID    polyethylene glycol (PEG 3350) (Miralax) 17 g oral packet 17 g  17 g Oral Daily PRN    bisacodyl (Dulcolax) 10 MG rectal suppository 10 mg  10 mg Rectal Daily PRN    rivaroxaban (Xarelto) tab 20 mg  20 mg Oral Daily with food    [Held by provider] lisinopril (Prinivil; Zestril) tab 20 mg  20 mg Oral Daily    acetaminophen (Tylenol Extra Strength) tab 500-1,000 mg  500-1,000 mg Oral Q4H PRN    tamsulosin (Flomax) cap 0.4 mg  0.4 mg Oral BID    atorvastatin (Lipitor) tab 10 mg  10 mg Oral Nightly    hydrocortisone (Anusol-HC) 2.5 % rectal cream   Rectal BID PRN    venlafaxine ER (Effexor-XR) 24 hr cap 37.5 mg  37.5 mg Oral Nightly

## 2025-04-27 NOTE — PLAN OF CARE
At shift start, patient was noted to be asleep with his bipap on.  Patient is alert and oriented x 4.   Vital signs stable, blood pressures have been improved this shift. Afebrile. On room air with . NSR on tele. Patient denies dizziness/lightheadedness.  Patient reports poor appetite/oral intake, c/o burning sensation in his throat after drinking.  Percocet given for c/o abdominal pain with good relief.  Calvo catheter is patent; draining clear yellow urine, decreased urine output.  PleurRx Catheter dressing C/D/I.  Ambulating  with 1 assist and walker.   Patient's son was at the bedside to start the shift and then the patient's wife came and has remained with him the rest of the night. Nurse awakened the patient's wife and asked if nurse could wake patient to administer HS medications. Patients wife asked for him to be allowed to sleep and if he woke later on, she asked for medications to be given at that time. HS medications were given at 0054. Patient asked for 0700 medications to be deferred if he was asleep in the morning.  All appropriate safety measures in place. Call light in reach.

## 2025-04-27 NOTE — PROGRESS NOTES
Orthostatic vitals       04/27/25 0459 04/27/25 0500 04/27/25 0502   Vital Signs   Pulse 79 87 104   Heart Rate Source Monitor  --  Monitor   Respiratory Quality Normal  --  Normal   /51  --  108/63   MAP (mmHg) 69  --  75   BP Location Right arm  (Simultaneous filing. User may not have seen previous data.)  --  Right arm   BP Method Automatic  --  Automatic   Patient Position Sitting  (Simultaneous filing. User may not have seen previous data.)  --  Sitting      04/27/25 0504   Vital Signs   Pulse 110   Heart Rate Source Monitor   Respiratory Quality Normal   BP (!) 87/48   MAP (mmHg) (!) 54   BP Location Right arm   BP Method Automatic   Patient Position Standing

## 2025-04-27 NOTE — PROGRESS NOTES
[facility]  Progress Note    Bruce R Salus Patient Status:  Inpatient    10/31/1950 MRN [id number]   Location [facility] 3NW-A Attending Scarlett Saenz*   Hosp Day # 12 PCP Arlene Bruce MD     Subjective:  Patient remains somewhat uncomfortable.  He has not been able to eat much.  He had 1 L drained yesterday.  He had orthostatic hypotension this morning.  In bed his blood pressure stable.  His abdomen is more distended.  Wife and son are at bedside.  His daughter is on the phone.  His creatinine is stable from yesterday.  Nephrology is also following now.    Objective:  Blood pressure 122/58, pulse 79, temperature 98 °F (36.7 °C), temperature source Oral, resp. rate 18, height 5' 11\" (1.803 m), weight 200 lb 13.4 oz (91.1 kg), SpO2 92%.    Gen: lying in bed, appears fatigued  HEENT: normocephalic  CV: regular rate  Lungs: CTA   Abd: moderately distended, mild TTP  Extrem: no BLE edema  Skin: No rash  Neuro: AOx3    Labs:   Lab Results   Component Value Date    WBC 11.8 2025    HGB 12.4 2025    HCT 38.6 2025    .0 2025    CREATSERUM 1.78 2025    BUN 47 2025     2025    K 5.0 2025     2025    CO2 25.0 2025     2025    CA 10.5 2025    ALB 3.7 2025    MG 2.3 2025    PHOS 3.8 2025       Lab Results   Component Value Date    WBC 11.8 (H) 2025    HGB 12.4 (L) 2025    HCT 38.6 (L) 2025    .0 (H) 2025    NEPERCENT 76.9 2025    LYPERCENT 9.1 2025    MOPERCENT 10.3 2025    EOPERCENT 1.3 2025    BAPERCENT 0.6 2025    NE 8.53 (H) 2025    LYMABS 1.01 2025    MOABSO 1.14 (H) 2025    EOABSO 0.14 2025    BAABSO 0.07 2025           Assessment and Plan:  Metastatic cholangiocarcinoma  - currently on FOLFIRI  - no current plans for intpt chemo  - had extensive d/w pt and daughter that at this time  we will try to get him improved from his acute issues, so that he can follow-up with Dr. Cassidy for cancer management  - CA 19-9: 1568  - Discussed that the CA 19-9 has gone up from outpatient lab.  However these are 2 different labs and ranges can be different.  Also levels could be affected by acute issues going on in the hospital.  -Overall prognosis from his cancer is poor.  Discussed with primary.  - follow-up Dr. Cassidy on discharge    Recurrent ascites  - s/p abdominal pleurx 4/22  - Recommended to drain maximum of 2 L at home at one time via home health RN or family. Do NOT drain if SBP low and/or feeling lightheaded/dizzy.  Spent extensive time discussing this with patient and family.  - Nurse to drain 1 L from abdominal Pleurx this am and see how he does.  Potentially drain more later if BP tolerates and abdomen still distended.    Anemia  - Hgb stable  - cont to monitor    CVC-associated DVT  - cont Xarelto 20mg daily     Ileus  - CT abd/pelvis 4/22: possibly adynamic ileus  - GI consulted - recommend PT  - advised pt and daughter to discuss bowel regimen with GI  - pain mgmt per primary    Coagulopathy  - INR elevated  - s/p Vitamin K 5mg PO x1     Hypotension  - Likely due to third spacing   - Can give IV albumin as needed, especially after ascites drainage    LOREN  - SCr increased to 2.08 on 4/225  - Likely due to renal hypoperfusion from hypotension  - IV albumin was given  - albumin not low, but appears he is getting more distended with IV NS. Can give IV albumin. Discussed with primary and Nephrology.   - Continue to monitor    Thank you for allowing me to participate in the care of this patient.  Patient can be discharged from oncology standpoint when ok per other providers.  Follow-up with Dr. Cassidy on discharge.    Natalia Diehl MD  [facility]  Department of Oncology and Hematology

## 2025-04-27 NOTE — PROGRESS NOTES
AM Orthostatic Vitals   04/27/25 0459 04/27/25 0502 04/27/25 0504   Vital Signs   Pulse 79 104 110   Heart Rate Source Monitor Monitor Monitor   Respiratory Quality Normal Normal Normal   /51 108/63 (!) 87/48   MAP (mmHg) 69 75 (!) 54   BP Location Right arm Right arm Right arm   BP Method Automatic Automatic Automatic   Patient Position Lying Sitting Standing

## 2025-04-27 NOTE — CM/SW NOTE
MDO Social Work Consult for Hospice; SANJAY sent referral to [facility] (current with [facility]) in aidin system and spoke with agency liaison Vijaya for referral update.      Update:  1500: SANJAY received call from Maryanne at [facility] for update; Maryanne stated she will call patient's family to schedule meeting for tomorrow.  RN updated.      1645: SANJAY spoke to patient's daughter and HC HARLAN Mccabe for update; Estelle stated she has not leroy in contact with anyone from hospice today.  SANJAY provided Sumaya with contact number for BRYAN Madden with [facility] and offered to call Maryanne and request follow up.  SANJAY called BRYAN Madden at [phone] for follow up, no answer.  SANJAY spoke to [facility] BRYAN Lilly to request follow up and provided contact information for Estelle.  Anticipate hospital bed with pressure relief mattress, commode, and wheelchair needed for home.  Vijaya stated she will call Estelle to discuss Hospice services and obtain consent.       Patient's daughter/HC HARLAN-Estelle Lau   P:[phone]    [facility]  Maryanne RN: [phone]   Vijaya RN: [phone]      Adele Fry, RN Case Manager [id number]

## 2025-04-27 NOTE — SPIRITUAL CARE NOTE
Spiritual Care Visit Note    Patient Name: Bruce R Salus Date of Spiritual Care Visit: 25   : 10/31/1950 Primary Dx: Urinary retention       Referred By: Referral From: Nurse (Jaelyn)    Spiritual Care Taxonomy:    Intended Effects: Aligning care plan with patient's values    Methods: Collaborate with care team member    Interventions: Assist someone with Advance Directives    Visit Type/Summary:     - PoA: Other: Per RN's request, left PoA information and Spiritual Care contact information with RN.  remains available for follow up.    Spiritual Care support can be requested via an [facility] consult. For urgent/immediate needs, please contact the On Call  at: [facility]: ext [phone]    Rev Rain Ventura MA

## 2025-04-27 NOTE — PROGRESS NOTES
Family undergoing discussion of hospice/comfort care    Will await decision but no further recs at this time

## 2025-04-28 VITALS
HEART RATE: 87 BPM | TEMPERATURE: 98 F | RESPIRATION RATE: 18 BRPM | DIASTOLIC BLOOD PRESSURE: 65 MMHG | WEIGHT: 200.81 LBS | HEIGHT: 71 IN | BODY MASS INDEX: 28.11 KG/M2 | OXYGEN SATURATION: 96 % | SYSTOLIC BLOOD PRESSURE: 117 MMHG

## 2025-04-28 LAB
ALBUMIN SERPL-MCNC: 3.9 G/DL (ref 3.2–4.8)
ANION GAP SERPL CALC-SCNC: 10 MMOL/L (ref 0–18)
BUN BLD-MCNC: 50 MG/DL (ref 9–23)
CALCIUM BLD-MCNC: 10.4 MG/DL (ref 8.7–10.6)
CHLORIDE SERPL-SCNC: 101 MMOL/L (ref 98–112)
CO2 SERPL-SCNC: 24 MMOL/L (ref 21–32)
CREAT BLD-MCNC: 1.79 MG/DL (ref 0.7–1.3)
EGFRCR SERPLBLD CKD-EPI 2021: 39 ML/MIN/1.73M2 (ref 60–?)
GLUCOSE BLD-MCNC: 107 MG/DL (ref 70–99)
MAGNESIUM SERPL-MCNC: 2.5 MG/DL (ref 1.6–2.6)
OSMOLALITY SERPL CALC.SUM OF ELEC: 294 MOSM/KG (ref 275–295)
PHOSPHATE SERPL-MCNC: 3.9 MG/DL (ref 2.4–5.1)
POTASSIUM SERPL-SCNC: 5 MMOL/L (ref 3.5–5.1)
SODIUM SERPL-SCNC: 135 MMOL/L (ref 136–145)

## 2025-04-28 RX ORDER — METOCLOPRAMIDE 5 MG/1
5 TABLET ORAL 3 TIMES DAILY
Status: DISCONTINUED | OUTPATIENT
Start: 2025-04-28 | End: 2025-04-28

## 2025-04-28 RX ORDER — MORPHINE SULFATE 10 MG/5ML
2.5 SOLUTION ORAL EVERY 2 HOUR PRN
Refills: 0 | Status: DISCONTINUED | OUTPATIENT
Start: 2025-04-28 | End: 2025-04-28

## 2025-04-28 RX ORDER — METOCLOPRAMIDE HYDROCHLORIDE 5 MG/ML
5 INJECTION INTRAMUSCULAR; INTRAVENOUS 3 TIMES DAILY
Status: DISCONTINUED | OUTPATIENT
Start: 2025-04-28 | End: 2025-04-28

## 2025-04-28 RX ORDER — ALBUMIN (HUMAN) 12.5 G/50ML
25 SOLUTION INTRAVENOUS ONCE
Status: COMPLETED | OUTPATIENT
Start: 2025-04-28 | End: 2025-04-28

## 2025-04-28 NOTE — PROGRESS NOTES
Chart reviewed.   Hospice consultation pending.    We will follow as needed.     Please call us if needed.    Mark Gupta  Oncology

## 2025-04-28 NOTE — PROGRESS NOTES
Patient transitioning to hospice care.     Nephrology will sign off.     Angel Durham, DO  [facility] - Nephrology

## 2025-04-28 NOTE — CM/SW NOTE
CM notified that of planned dc to home today with [facility]. POLST completed with pt's sps Macie and signed by Dr. Hassan. Copy of POLST placed on pt's chart.     Addendum 1015: Vijaya from [facility] on site and reports DME has been delivered. Pt can dc at whatever time works for careteam/pt/family.      [facility] placed on will-call for today, PCS form completed and available for RN.    Addendum 1100: Pt scheduled for 2pm . Plan for Pleur-x drainage followed by albumin dose prior to DC. RN can call CM if transport needs to be adjusted.     CM/SW will remain available for DC planning and/or support.     LENCHO BaptisteN, CMSRN    [id number]

## 2025-04-28 NOTE — PHYSICAL THERAPY NOTE
Per chart reviewed noted hospice consult. Rn confirmed that pt is discharging with hospice. PT will sign off.

## 2025-04-28 NOTE — PROGRESS NOTES
The patient is alert and oriented x 4, and his vital signs are stable. He has tolerated the removal of 2 liters of fluid from his Pleurx catheter today.

## 2025-04-28 NOTE — PROGRESS NOTES
Patient is A&O x 4. Vital signs stable. Afebrile. On room air with . KAREN - CPAP at night. NSR on tele.  Patient refused to have midnight vitals taken and he requested to just retake vitals tomorrow morning since he wants to get some sleep tonight.  Tolerating diet well. Denies nausea/vomiting.   Patient c/o abdominal pain. Roxicodone given with moderate relief.  Patient c/o hiccups, IV Thorazine given.  Medications were administered per MAR with no difficulty.  Calvo Catheter - draining yellow, clear, minimal urine output.  PleurX drain dressing - C/D/I.  Plan of care discussed with patient and wife at bedside; no further questions at this time.   All appropriate safety measures in place. Call light in reach.

## 2025-04-28 NOTE — PROGRESS NOTES
A/o x4, on room air. Refused tele. VSS. 2L drained from abdomen. Tolerated well. Nausea and pain managed per MAR. Family at bedside educated on discharge instructions. Discharge planning. Safety precautions in place. No further needs at this time.

## 2025-04-28 NOTE — PROGRESS NOTES
NURSING DISCHARGE NOTE    Discharged Home via Ambulance.  Accompanied by Family member, Spouse, and Support staff  Belongings Taken by patient/family.    AVS explained to patient, daughters and spouse at bedside.Answered all questions and concerns. PIV removed. VSS. Reglan given for nausea. No further needs at this time.

## 2025-04-28 NOTE — SPIRITUAL CARE NOTE
Spiritual Care Visit Note    Patient Name: Bruce R Salus Date of Spiritual Care Visit: 25   : 10/31/1950 Primary Dx: Urinary retention       Referred By: Referral From:     Spiritual Care Taxonomy:    Intended Effects: Convey a calming presence    Methods: Collaborate with care team member, Offer spiritual/Mosque support, Offer support    Interventions: Active listening, Ask guided questions, Discuss concerns, Explain  role, [place]    Visit Type/Summary:     - PoA: Other: Patient did not wish to receive advance directive information at this time.  confirmed with nurse that patient is decisional. Patient told  that he has a POAH at home. The patient's wife Macie asked to talk with  outside patient room. She explained that patient was given a few days to live and that she could use prayer.  provided emotional support to Zuleika through affirmation of her grief and active listening as she talked.  followed up with prayer for her and her  as they prepare for discharge tomorrow. Zuleika thanked  for support.  offered to follow up with grief support services info for Zuleika. She told  that she works for [employer] and has support in place.  followed up with night and day nurse (before and) after visit.  remains available for follow up.    Spiritual Care support can be requested via an Epic consult. For urgent/immediate needs, please contact the On Call  at: [facility]: ext [phone]    Min. Bushra Ronquillo Mdiv.

## 2025-05-02 NOTE — DISCHARGE SUMMARY
.DM Internal Medicine Discharge Summary    Patient ID:  Bruce R Salus  [id number]  74 year old  10/31/1950    Admit date: 4/14/2025  Discharge date and time: 5/2/2025  Attending Physician: No att. providers found  Primary Care Physician: Arlene Bruce MD     Admit Dx: Urinary retention [R33.9]    Primary Diagnosis at discharge from Hospital: Other: metastatic cholangioCA; no TCM follow-up needed     Secondary Diagnoses:  Pedrito  Ascites  Urine retention  Hypotension  Odynophagia  Poor oral intake    Risk of readmission: Bruce R Salus has Low Risk of readmission after discharge from the hospital.    Discharged Condition: stable    Disposition:  home with hospice    Important follow up:  [facility]  [address]  [address]  [facility] [id number] [phone]    Follow up in 1 month(s)  catheter removal or catheter exchange    Ace Cassidy MD  [address]  [address]  [facility] [id number] [phone]    Follow up in 5 day(s)      Arlene Bruce MD  [address] [facility] [address]  [phone]    Schedule an appointment as soon as possible for a visit          Reason for admission and hospital course:   74-year-old man with history of metastatic cholangiocarcinoma who presented with urinary retention and ascites.  He had a history of peritoneal mets and recent recurrent cholangitis secondary to common hepatic duct stricture and with a history of plastic biliary stent placed in March 2025.  He underwent paracentesis on April 15 and cytology was consistent with metastatic tumor cells.  He had completed a 5-day course of antibiotics for peritonitis based on cell count.  He did have a CT showing adynamic ileus but began to move his bowels.  He had some rectal bleeding on admission that was attributed to hemorrhoids and had resolved.  Ascites reaccumulated and he had a Pleurx placed on April 22.  He had periodic drainage via the Pleurx subsequently.  Patient also had cystoscopy and  placement of Calvo catheter intraoperatively for urine retention.  Urine culture was negative and he was started on Flomax with up plan to potentially replace Calvo catheter as an outpatient.  Unfortunately he continued to have further issues including worsening renal function, worsening oral intake with odynophagia despite trying to optimize with medications.  Patient was also increasingly uncomfortable with trying to balance his ascites with his comfort level and renal function.  Further goals of care discussions were extensively had with family and with patient.  It was felt appropriate for patient to transition to hospice care and home arrangements with hospice were made.  Patient's PCP and oncologist were notified of the plans as well.    Day of discharge Exam  Vitals:    04/28/25 1300   BP: 117/65   Pulse: 87   Resp:    Temp:      Exam on day of discharge:  Gen: No acute distress, alert and oriented  CV: RRR, +s1/s2  Lungs: CTAB, good respiratory effort  Abdomen: s/nt/nd  Ext: Moves all 4 extremities, no c/c/e  Neuro: CN Intact, no focal deficits    Discharge meds     Medication List        START taking these medications      Naloxone HCl 4 MG/0.1ML Liqd  4 mg by Nasal route as needed. If patient remains unresponsive, repeat dose in other nostril 2-5 minutes after first dose.     pantoprazole 40 MG Tbec  Commonly known as: Protonix  Take 1 tablet (40 mg total) by mouth 2 (two) times daily before meals.  Replaces: omeprazole 20 MG Cpdr            CONTINUE taking these medications      acetaminophen 500 MG Tabs  Commonly known as: Tylenol Extra Strength     Multi Vitamin Mens Tabs     simvastatin 20 MG Tabs  Commonly known as: Zocor  Take 1 tablet (20 mg total) by mouth nightly. At Bedtime     tamsulosin 0.4 MG Caps  Commonly known as: Flomax     venlafaxine ER 37.5 MG Cp24  Commonly known as: Effexor-XR     Xarelto 20 MG Tabs  Generic drug: rivaroxaban            STOP taking these medications      Benazepril  HCl 20 MG Tabs  Commonly known as: LOTENSIN     cefadroxil 500 MG Caps  Commonly known as: DURICEF     omeprazole 20 MG Cpdr  Commonly known as: PriLOSEC  Replaced by: pantoprazole 40 MG Tbec               Where to Get Your Medications        These medications were sent to [id number] - [place], [place] - [phone], [phone]  [address] [facility] [address]      Phone: [phone]   Naloxone HCl 4 MG/0.1ML Liqd  pantoprazole 40 MG Tbec       Consults: IP CONSULT TO UROLOGY  IP CONSULT TO ONCOLOGY  IP CONSULT TO SOCIAL WORK  IP CONSULT TO SOCIAL WORK  IP CONSULT PALLIATIVE CARE  IP CONSULT TO FOOD AND NUTRITION SERVICES  IP CONSULT TO FOOD AND NUTRITION SERVICES  IP CONSULT TO SOCIAL WORK  NURSING CONSULT TO DIETITIAN  IP CONSULT TO SOCIAL WORK  IP CONSULT TO SOCIAL WORK  IP CONSULT TO SOCIAL WORK  IP CONSULT TO NEPHROLOGY  IP CONSULT TO HOSPICE  IP CONSULT TO SPIRITUAL CARE  IP CONSULT TO SOCIAL WORK  Radiology: CT ABDOMEN+PELVIS(CONTRAST ONLY)(CPT=74177)  Result Date: 4/22/2025  PROCEDURE:  CT ABDOMEN+PELVIS (CONTRAST ONLY) (CPT=74177)  COMPARISON:  [facility] US, US PARACENTESIS W IMAGING (CPT=49083), 4/17/2025, 3:48 PM.  [facility] , XS, IR PLEUREX CATHETER DRAINAGE INSERTION, 4/22/2025, 7:36 AM.  CONI CT, CT ABDOMEN+PELVIS(CONTRAST ONLY)(CPT=74177), 4/16/2025, 4:30 PM.  INDICATIONS:  abd distension/bloating, rule out ileus/SBO  TECHNIQUE:  CT scanning was performed from the dome of the diaphragm to the pubic symphysis with non-ionic intravenous contrast material. Post contrast coronal MPR imaging was performed.  Dose reduction techniques were used. Dose information is transmitted to the ACR (American College of Radiology) NRDR (National Radiology Data Registry) which includes the Dose Index Registry.  PATIENT STATED HISTORY:(As transcribed by Technologist)  Patient is here for abdominal distention.   CONTRAST USED:  100cc of Isovue 370  FINDINGS:  LIVER:   Stable.  Segmental obstruction and or infiltrating tumor in the lateral segment left lobe. BILIARY:  Stable.  Surgically absent gallbladder.  Central endo biliary stenting.  No new intrahepatic dilatation. PANCREAS:  Stable.  Uniform parenchyma.  No ductal dilatation. SPLEEN:  Stable.  Not enlarged. KIDNEYS:  Stable.  Normal anatomic positions.  No hydronephrosis or new perinephric stranding.  Numerous circumscribed low-attenuation foci are seen consistent with cortical and parapelvic cysts.  A single nonobstructing calculus is seen in each kidney.  The larger is on the left measuring 0.8 cm. ADRENALS:  Stable.  Not enlarged. AORTA/VASCULAR:  Stable.  Moderate calcified atherosclerosis.  No abdominal aortic aneurysm.  Patent celiac artery, SMA and SHANNA.  Patent splenic vein and right portal vein.  The left portal vein is not definitively visualized/opacified, unchanged. RETROPERITONEUM:  Stable.  No adenopathy. BOWEL/MESENTERY:  There is fluid distension of small bowel.  Diameter measured up to 3.1 cm.  There is also air and stool distension of colon, with cecum measured at 8.0 cm.   There is a small amount of nonspecific ascites, markedly decreased compared to the prior.  Increased attenuation and nodularity of the omentum is unchanged consistent with carcinomatosis.  Dominant area is left upper quadrant.  This is measured at 5.7 x 4.1 cm, prior 5.7 x 4.3 cm.  ABDOMINAL WALL:  Right-sided PleurX catheter is noted. URINARY BLADDER:  A Calvo catheter is present.  Nondistended bladder with smooth contour. PELVIC NODES:  Stable.  Not enlarged. PELVIC ORGANS:  Stable.  Moderate prostatomegaly. BONES:  Stable.  Moderate to severe degenerative changes.  Maintained vertebral body heights.  No subluxation. LUNG BASES:  There is a trace left pleural effusion.  There is mild airspace disease, unchanged. OTHER:  None.              CONCLUSION:  1. There is fluid distension of small bowel with air and stool distension of colon.   This may reflect adynamic ileus. 2. Interval placement of a PleurX catheter.  Marked decrease in ascites.  Omental changes consistent with carcinomatosis appear similar to the prior. 3. Resolution of small right pleural effusion.  Decrease in small left pleural effusion.  Stable basilar airspace disease.  This may be atelectasis.  The potential for pneumonia should be correlated with clinical suspicion for infection. 4. Details as above.  Continued clinical correlation recommended.    LOCATION:  [facility]   Dictated by (CST): Adán Momin MD on 4/22/2025 at 3:42 PM     Finalized by (CST): Adán oMmin MD on 4/22/2025 at 3:53 PM       IR PLEUREX CATHETER  Result Date: 4/22/2025  PROCEDURE:  IR PLEUREX CATHETER DRAINAGE INSERTION  INDICATIONS:  recurrent ascites  TECHNIQUE:    Following a discussion of the procedure with the patient and/or family, including its risks, benefits, alternatives, and steps to prevent infection, a witnessed verbal and signed consent was obtained and documented in the patient's chart.  All operators present for the case performed standard pre-procedural prep including hand washing, sterile gloves, gown, mask, and cap. All aspects of the maximum sterile barrier technique were followed. A preprocedural time out was performed with all physicians, technologists, and nurses involved with the procedure.  COMPARISON:  None.  Prior to the procedure, I discussed with the patient and/or legal representative the potential benefits, risks, and side effects of this procedure, the likelihood of the patient achieving goals; and the potential problems that might occur during recuperation.  I discussed reasonable alternatives to the procedure, including risks, benefits, steps to prevent infection and side effects related to the alternatives, and risks related to not receiving this procedure. A witnessed verbal and signed consent was obtained and documented in the patient's chart. IV was checked and maintained by  the nurse. Moderate conscious sedation was performed under continuous pulse oximetry and cardiac monitoring under my direct supervision.  The radiology nurse was in attendance throughout the exam. Moderate conscious sedation of 2 mg Versed and 100 mcg of Fentanyl was given.  Patient was assessed and monitoring of oxygen saturation, heart rate, and blood pressure by the nursing staff and myself during the exam for a total intraservice time of 13 minutes of conscious sedation time from 810 to 823.  Two grams of Ancef were given pre-procedurally via existing IV.  The patient was placed supine on the angiographic table and the right abdomen was prepped and covered with a full body drape in the usual sterile fashion.  All operators present for the case performed standard pre-procedural prep including hand washing, sterile gloves, gown, mask, and cap.  All aspects of the maximum sterile barrier technique were followed.  A preprocedural time out was performed with all physicians, technologists, and nurses involved with the procedure.  Sonography of the abdomen demonstrated fluid and a permanent image was obtained. Under sonographic guidance, the fluid was accessed using a micropuncture system.  A guidewire was advanced into the fluid under fluoroscopic guidance.  Using blunt dissection a tunneled catheter was placed via a peel-away sheath.  The catheter was secured.  Sterile gauze and transparent dressing was applied. The patient tolerated the procedure well and there were no immediate complications.  The patient was escorted to the holding area for routine observation.  Routine post pleurx catheter insertion instructions were communicated to the patient. ESTIMATED BLOOD LOSS: Less than 5cc. FLUOROSCOPY TIME/DOSE:  0.1  minutes AIR KERMA:  5.81 mGy            CONCLUSION: Successful placement of abdominal Pleurx catheter.   LOCATION:  [facility]       Dictated by (CST): Smooth Quezada MD on 4/22/2025 at 8:48 AM     Finalized by  (CST): Smooth Quezada MD on 4/22/2025 at 8:49 AM       XR ABDOMEN (1 VIEW) (CPT=74018)  Result Date: 4/20/2025  PROCEDURE:  XR ABDOMEN (1 VIEW) (CPT=74018)  INDICATIONS:  distention, ileus evaluation  COMPARISON:  [facility] , CT, CT ABDOMEN+PELVIS(CONTRAST ONLY)(CPT=74177), 4/16/2025, 4:30 PM.  TECHNIQUE:  Supine AP view was obtained.  PATIENT STATED HISTORY: (As transcribed by Technologist)  Patient offered no additional history at this time.     FINDINGS:  BOWEL GAS PATTERN:  Air-filled distention of bowel in the abdomen and pelvis is noted.  Likely small bowel in the right upper quadrant measures up to 6.9 cm in diameter. CALCIFICATIONS:  None significant. OTHER:  Common bile duct stent is noted.            CONCLUSION:  Air-filled distention of multiple loops of bowel in the abdomen and pelvis are noted.  This may be related to an ileus.  There is a moderately distended loop of small bowel in the right upper quadrant that is new when compared to prior CT examination.  Possibility of a developing bowel obstruction related to this loop cannot be excluded.   LOCATION:  [facility]   Dictated by (CST): Ceasar Workman MD on 4/20/2025 at 9:46 AM     Finalized by (CST): Ceasar Workman MD on 4/20/2025 at 9:46 AM       US PARACENTESIS W IMAGING (CPT=49083)  Result Date: 4/17/2025  PROCEDURE:  US PARACENTESIS W IMAGING  (CPT=49083)  COMPARISON:  None.  INDICATIONS:  ascities  DESCRIPTION OF PROCEDURE:  Informed consent was obtained.  The patient was prepped and draped in the standard sterile fashion.  An ultrasound-guided paracentesis was performed in the usual sterile manner.  PATIENT STATED HISTORY: (As transcribed by Technologist)     LOCATION:  Right lower quadrant FLUID REMOVED:  5.0 L of ascites removed. MEDICATION:  10 cc of 1% Lidocaine for local anesthetic. COMPLICATIONS:  None.            CONCLUSION:  Ultrasound-guided paracentesis was performed without complication.   LOCATION:  [facility]     Dictated by (CST):  Kelvin Trinh DO on 4/17/2025 at 5:21 PM     Finalized by (CST): Kelvin Trinh DO on 4/17/2025 at 5:22 PM       CT ABDOMEN+PELVIS(CONTRAST ONLY)(CPT=74177)  Result Date: 4/16/2025  PROCEDURE:  CT ABDOMEN+PELVIS (CONTRAST ONLY) (CPT=74177)  COMPARISON:  [facility] , CT, CT ABDOMEN+PELVIS(CONTRAST ONLY)(CPT=74177), 7/11/2024, 2:21 PM.  INDICATIONS:  assess progression ca  TECHNIQUE:  CT scanning was performed from the dome of the diaphragm to the pubic symphysis with non-ionic intravenous contrast material. Post contrast coronal MPR imaging was performed.  Dose reduction techniques were used. Dose information is transmitted to the ACR (American College of Radiology) NRDR (National Radiology Data Registry) which includes the Dose Index Registry.  PATIENT STATED HISTORY:(As transcribed by Technologist)  Patient presents for evaluation of ascites, progression of Cancer. Pain to lower extremeties.   CONTRAST USED:  100cc of Isovue 370 FINDINGS:  LUNG BASE:  Small bilateral pleural effusions with bibasilar atelectasis.. LIVER:  Shrunken appearance with heterogeneous enhancement in biliary ductal prominence involving the left lobe of the liver.  This is not significantly changed since 7/11/2024 compatible with the patient's diagnosis of cholangiocarcinoma.  BILIARY:  Common bile duct stent in place.  PANCREAS:  Pancreatic atrophy. SPLEEN:  Normal caliber. KIDNEYS:  Stable bilateral cortical cysts and parapelvic cysts.  Bilateral nonobstructing nephrolithiasis.  ADRENALS:  Normal. AORTA/VASCULAR:  No aneurysm.  Tortuous abdominal aorta.  Aortoiliac calcification. RETROPERITONEUM:  No enlarged adenopathy. BOWEL/MESENTERY:  There is a large degree of abdominal and pelvic ascites.  There is omental carcinomatosis which has developed measuring approximately 5.7 x 4.3 cm and 8.6 x 3.5 cm in the left upper quadrant.  Colonic diverticulosis.  No evidence of bowel obstruction. ABDOMINAL WALL:  Small fat containing umbilical hernia.   PELVIC ORGANS:  There is a Calvo catheter place.  The urinary bladder is completely decompressed.  BONES:  Multilevel endplate hypertrophic changes throughout the thoracic and lumbar spine with degenerative disc disease lower lumbar spine.  No obvious lytic destructive process.             CONCLUSION:  Interval development of omental carcinomatosis with large degree of abdominal and pelvic ascites when compared to 7/11/2024 CT scan.  There are now small bilateral pleural effusions with bibasilar atelectasis.  There is a heterogeneous infiltrating process in the left lobe of the liver with biliary ductal prominence without significant change.  Common bile duct stent in place.  Pancreatic ductal stent is no longer appreciated.  Stable CT appearance of the kidneys with bilateral cysts and nonobstructing calculi.   LOCATION:  [facility]   Dictated by (CST): Cally Frye MD on 4/16/2025 at 4:48 PM     Finalized by (CST): Cally Frye MD on 4/16/2025 at 4:59 PM       US PARACENTESIS W IMAGING (CPT=49083)  Result Date: 4/15/2025  PROCEDURE:  US PARACENTESIS W IMAGING  (CPT=49083)  COMPARISON:  US [facility], US ABDOMEN LIMITED (CPT=76705), 4/14/2025, 2:50 PM.  INDICATIONS:  Ascites  DESCRIPTION OF PROCEDURE:  Informed consent was obtained.  The patient was prepped and draped in the standard sterile fashion.  An ultrasound-guided paracentesis was performed in the usual sterile manner.  PATIENT STATED HISTORY: (As transcribed by Technologist)     LOCATION:  Right lower quadrant FLUID REMOVED:  4000 cc MEDICATION:  Three cc of 1% Lidocaine for local anesthetic. COMPLICATIONS:  None. LABORATORY:  Pending            CONCLUSION:  Ultrasound-guided paracentesis was performed without complication.   LOCATION:  [facility]     Dictated by (CST): Smooth Purcell MD on 4/15/2025 at 2:34 PM     Finalized by (CST): Smooth Purcell MD on 4/15/2025 at 2:35 PM       US ABDOMEN LIMITED (CPT=76705)  Result Date: 4/14/2025  PROCEDURE:  US ABDOMEN  LIMITED (CPT=76705)  COMPARISON:  None.  INDICATIONS:  Abdominal distension rule out ascites  PATIENT STATED HISTORY: (As transcribed by Technologist)     FINDINGS:  There is ascites noted in all 4 quadrants of the abdomen moderate in degree.             CONCLUSION:  Moderate degree of ascites.   LOCATION:  [facility]   Dictated by (CST): Cally Frye MD on 4/14/2025 at 3:48 PM     Finalized by (CST): Cally Frye MD on 4/14/2025 at 3:49 PM       Operative Procedures: Procedure(s) (LRB):  CYSTOSCOPY, complicatited  MARAVILLA CATHETER PLACEMENT (N/A)  Activity: activity as tolerated  Diet: regular diet  Wound Care: none needed  Code Status: DNAR/Comfort Care  O2: none  Total Time Coordinating Care: 35 minutes Patient had opportunity to ask questions and state understand and agree with therapeutic plan as outlined    I reconciled current and discharge medications on day of discharge.

## (undated) DEVICE — MEDI-VAC NON-CONDUCTIVE SUCTION TUBING 6MM X 1.8M (6FT.) L: Brand: CARDINAL HEALTH

## (undated) DEVICE — GLOVE,SURG,SENSICARE SLT,LF,PF,6.5: Brand: MEDLINE

## (undated) DEVICE — 1200CC GUARDIAN II: Brand: GUARDIAN

## (undated) DEVICE — KIT CUSTOM ENDOPROCEDURE STERIS

## (undated) DEVICE — OASIS, ONE ACTION STENT INTRODUCTION SYSTEM: Brand: OASIS

## (undated) DEVICE — BITEBLOCK ENDOSCP 60FR MAXI STRP

## (undated) DEVICE — RETRIEVAL BALLOON CATHETER: Brand: EXTRACTOR™ PRO RX

## (undated) DEVICE — FUSION OMNI-TOME SPHINCTEROTOME: Brand: FUSION

## (undated) DEVICE — KIT VLV 5 PC AIR H2O SUCT BX ENDOGATOR CONN

## (undated) DEVICE — SNAPLOC WIRE GUIDE LOCKING DEVICE: Brand: SNAPLOC

## (undated) DEVICE — 3M™ RED DOT™ MONITORING ELECTRODE WITH FOAM TAPE AND STICKY GEL, 50/BAG, 20/CASE, 72/PLT 2570: Brand: RED DOT™

## (undated) DEVICE — CANNULATING SPHINCTEROTOME: Brand: AUTOTOME™ RX 44

## (undated) DEVICE — CONMED SCOPE SAVER BITE BLOCK, 20X27 MM: Brand: SCOPE SAVER

## (undated) DEVICE — 10FT COMBINED O2 DELIVERY/CO2 MONITORING. FILTER WITH MICROSTREAM TYPE LUER: Brand: DUAL ADULT NASAL CANNULA

## (undated) DEVICE — V2 SPECIMEN COLLECTION MANIFOLD KIT: Brand: NEPTUNE

## (undated) DEVICE — FUSION TITAN BILIARY DILATION BALLOON 3.2MM: Brand: FUSION TITAN

## (undated) DEVICE — ACCESS AND DELIVERY CATHETER: Brand: SPYSCOPE™ DS II

## (undated) DEVICE — LASSO POLYPECTOMY SNARE: Brand: LASSO

## (undated) DEVICE — SLEEVE COMPR MD KNEE LEN SGL USE KENDALL SCD

## (undated) DEVICE — ACROBAT 2 CALIBRATED TIP WIRE GUIDE: Brand: ACROBAT

## (undated) DEVICE — FIRSTSTEP 200ML READY2USE GEMI

## (undated) DEVICE — SINGLE USE DISTAL COVER MAJ-2315: Brand: SINGLE USE DISTAL COVER

## (undated) DEVICE — GLOVE,SURG,SENSICARE,ALOE,LF,PF,7: Brand: MEDLINE

## (undated) DEVICE — TUR/ENDOSCOPIC CABLE, 10' (3.05 M): Brand: CONMED

## (undated) DEVICE — MEDI-VAC NON-CONDUCTIVE SUCTION TUBING: Brand: CARDINAL HEALTH

## (undated) DEVICE — YANKAUER,BULB TIP,W/O VENT,RIGID,STERILE: Brand: MEDLINE

## (undated) DEVICE — GUIDEWIRE .035X150 STR ZIPWIRE

## (undated) DEVICE — SINGLE-USE BIOPSY FORCEPS: Brand: SPYBITE MAX

## (undated) DEVICE — PACK PBDS CYSTOSCOPY

## (undated) DEVICE — CATH FOLEY 18FR BLLN 5CC 2-W CARS MOD M

## (undated) DEVICE — REM POLYHESIVE ADULT PATIENT RETURN ELECTRODE: Brand: VALLEYLAB

## (undated) DEVICE — SYRINGE, LUER SLIP, STERILE, 60ML: Brand: MEDLINE

## (undated) DEVICE — GRASPER ENDOSCP L200CM SHTH DIA1.9MM MINI DEV

## (undated) DEVICE — BAG DRNGE 2000ML URIN INF CTRL ANTIREFLX

## (undated) DEVICE — KIT MFLD FOR SPEC COLL

## (undated) DEVICE — CO2 CANNULA,SSOFT,ADLT,7O2,4CO2,FEMALE: Brand: MEDLINE

## (undated) DEVICE — KIT ENDO ORCAPOD 160/180/190

## (undated) DEVICE — SOEHENDRA BILIARY DILATION CATHETER: Brand: SOEHENDRA

## (undated) DEVICE — KIT CLEAN ENDOKIT 1.1OZ GOWNX2

## (undated) DEVICE — DEVICE INFL 60ML 15ATM PRSS COOK SPHR

## (undated) NOTE — LETTER
[facility]  Administration of Anesthesia  I, Bruce R Salus agree to be cared for by a physician anesthesiologist alone and/or with a nurse anesthetist, who is specially trained to monitor me and give me medicine to put me to sleep or keep me comfortable during my procedure    I understand that my anesthesiologist and/or anesthetist is not an employee or agent of [facility] or [facility]. He or she works for [facility]    As the patient asking for anesthesia services, I agree to:  Allow the anesthesiologist (anesthesia doctor) to give me medicine and do additional procedures as necessary. Some examples are: Starting or using an “IV” to give me medicine, fluids or blood during my procedure, and having a breathing tube placed to help me breathe when I’m asleep (intubation). In the event that my heart stops working properly, I understand that my anesthesiologist will make every effort to sustain my life, unless otherwise directed by [facility] Do Not Resuscitate documents.  Tell my anesthesia doctor before my procedure:  If I am pregnant.  The last time that I ate or drank.  iii. All of the medicines I take (including prescriptions, herbal supplements, and pills I can buy without a prescription (including street drugs/illegal medications). Failure to inform my anesthesiologist about these medicines may increase my risk of anesthetic complications.  iv.If I am allergic to anything or have had a reaction to anesthesia before.  I understand how the anesthesia medicine will help me (benefits).  I understand that with any type of anesthesia medicine there are risks:  The most common risks are: nausea, vomiting, sore throat, muscle soreness, damage to my eyes, mouth, or teeth (from breathing tube placement).  Rare risks include: remembering what happened during my procedure, allergic reactions to medications, injury to my airway, heart, lungs, vision, nerves, or  muscles and in extremely rare instances death.  My doctor has explained to me other choices available to me for my care (alternatives).  Pregnant Patients (“epidural”):  I understand that the risks of having an epidural (medicine given into my back to help control pain during labor), include itching, low blood pressure, difficulty urinating, headache or slowing of the baby’s heart. Very rare risks include infection, bleeding, seizure, irregular heart rhythms and nerve injury.  Regional Anesthesia (“spinal”, “epidural”, & “nerve blocks”):  I understand that rare but potential complications include headache, bleeding, infection, seizure, irregular heart rhythms, and nerve injury.    _____________________________________________________________________________  Patient (or Representative) Signature/Relationship to Patient  Date   Time    _____________________________________________________________________________   Name (if used)    Language/Organization   Time    _____________________________________________________________________________  Nurse Anesthetist Signature     Date   Time  _____________________________________________________________________________  Anesthesiologist Signature     Date   Time  I have discussed the procedure and information above with the patient (or patient’s representative) and answered their questions. The patient or their representative has agreed to have anesthesia services.    _____________________________________________________________________________  Witness        Date   Time  I have verified that the signature is that of the patient or patient’s representative, and that it was signed before the procedure  Patient Name: Bruce R Salus     : 10/31/1950                 Printed: 2024 at 12:04 PM    Medical Record #: [id number]                                            Page 1 of 1  ----------ANESTHESIA CONSENT----------

## (undated) NOTE — LETTER
[facility]  [address], [place], [place]    Authorization for Surgical Operation and Procedure                               I hereby authorize Robby Betts MD, my physician and his/her assistants (if applicable), which may include medical students, residents, and/or fellows, to perform the following surgical operation/ procedure and administer such anesthesia as may be determined necessary by my physician: Operation/Procedure name (s) ESOPHAGOGASTRODUODENOSCOPY (EGD) with stent removal on Bruce R Salus   2.   I recognize that during the surgical operation/procedure, unforeseen conditions may necessitate additional or different procedures than those listed above.  I, therefore, further authorize and request that the above-named surgeon, assistants, or designees perform such procedures as are, in their judgment, necessary and desirable.    3.   My surgeon/physician has discussed prior to my surgery the potential benefits, risks and side effects of this procedure; the likelihood of achieving goals; and potential problems that might occur during recuperation.  They also discussed reasonable alternatives to the procedure, including risks, benefits, and side effects related to the alternatives and risks related to not receiving this procedure.  I have had all my questions answered and I acknowledge that no guarantee has been made as to the result that may be obtained.    4.   Should the need arise during my operation/procedure, which includes change of level of care prior to discharge, I also consent to the administration of blood and/or blood products.  Further, I understand that despite careful testing and screening of blood or blood products by collecting agencies, I may still be subject to ill effects as a result of receiving a blood transfusion and/or blood products.  The following are some, but not all, of the potential risks that can occur: fever and allergic reactions, hemolytic  reactions, transmission of diseases such as Hepatitis, AIDS and Cytomegalovirus (CMV) and fluid overload.  In the event that I wish to have an autologous transfusion of my own blood, or a directed donor transfusion, I will discuss this with my physician.  Check only if Refusing Blood or Blood Products  I understand refusal of blood or blood products as deemed necessary by my physician may have serious consequences to my condition to include possible death. I hereby assume responsibility for my refusal and release the hospital, its personnel, and my physicians from any responsibility for the consequences of my refusal.    o  Refuse   5.   I authorize the use of any specimen, organs, tissues, body parts or foreign objects that may be removed from my body during the operation/procedure for diagnosis, research or teaching purposes and their subsequent disposal by hospital authorities.  I also authorize the release of specimen test results and/or written reports to my treating physician on the hospital medical staff or other referring or consulting physicians involved in my care, at the discretion of the Pathologist or my treating physician.    6.   I consent to the photographing or videotaping of the operations or procedures to be performed, including appropriate portions of my body for medical, scientific, or educational purposes, provided my identity is not revealed by the pictures or by descriptive texts accompanying them.  If the procedure has been photographed/videotaped, the surgeon will obtain the original picture, image, videotape or CD.  The hospital will not be responsible for storage, release or maintenance of the picture, image, tape or CD.    7.   I consent to the presence of a  or observers in the operating room as deemed necessary by my physician or their designees.    8.   I recognize that in the event my procedure results in extended X-Ray/fluoroscopy time, I may develop a skin  reaction.    9. If I have a Do Not Attempt Resuscitation (DNAR) order in place, that status will be suspended while in the operating room, procedural suite, and during the recovery period unless otherwise explicitly stated by me (or a person authorized to consent on my behalf). The surgeon or my attending physician will determine when the applicable recovery period ends for purposes of reinstating the DNAR order.  10. Patients having a sterilization procedure: I understand that if the procedure is successful the results will be permanent and it will therefore be impossible for me to inseminate, conceive, or bear children.  I also understand that the procedure is intended to result in sterility, although the result has not been guaranteed.   11. I acknowledge that my physician has explained sedation/analgesia administration to me including the risk and benefits I consent to the administration of sedation/analgesia as may be necessary or desirable in the judgment of my physician.    I CERTIFY THAT I HAVE READ AND FULLY UNDERSTAND THE ABOVE CONSENT TO OPERATION and/or OTHER PROCEDURE.     ____________________________________  _________________________________        ______________________________  Signature of Patient    Signature of Responsible Person                Printed Name of Responsible Person                                      ____________________________________  _____________________________                ________________________________  Signature of Witness        Date  Time         Relationship to Patient    STATEMENT OF PHYSICIAN My signature below affirms that prior to the time of the procedure; I have explained to the patient and/or his/her legal representative, the risks and benefits involved in the proposed treatment and any reasonable alternative to the proposed treatment. I have also explained the risks and benefits involved in refusal of the proposed treatment and alternatives to the proposed  treatment and have answered the patient's questions. If I have a significant financial interest in a co-management agreement or a significant financial interest in any product or implant, or other significant relationship used in this procedure/surgery, I have disclosed this and had a discussion with my patient.     _____________________________________________________              _____________________________  (Signature of Physician)                                                                                         (Date)                                   (Time)  Patient Name: Bruce R Salus      : 10/31/1950      Printed: 2024     Medical Record #: [id number]                                      Page 1 of 1

## (undated) NOTE — LETTER
[address]  [address]  Authorization for Surgical Operation and Procedure     Date:___________                                                                                                         Time:__________  I hereby authorize Surgeon(s):  Eduin Luna MD, my physician and his/her assistants (if applicable), which may include medical students, residents, and/or fellows, to perform the following surgical operation/ procedure and administer such anesthesia as may be determined necessary by my physician:  Operation/Procedure name (s) Procedure(s):  CYSTOSCOPY, POSSIBLE URETHRAL DILATION AND MARAVILLA CATHETER PLACEMENT on Bruce R Salus   2.   I recognize that during the surgical operation/procedure, unforeseen conditions may necessitate additional or different procedures than those listed above.  I, therefore, further authorize and request that the above-named surgeon, assistants, or designees perform such procedures as are, in their judgment, necessary and desirable.    3.   My surgeon/physician has discussed prior to my surgery the potential benefits, risks and side effects of this procedure; the likelihood of achieving goals; and potential problems that might occur during recuperation.  They also discussed reasonable alternatives to the procedure, including risks, benefits, and side effects related to the alternatives and risks related to not receiving this procedure.  I have had all my questions answered and I acknowledge that no guarantee has been made as to the result that may be obtained.    4.   Should the need arise during my operation/procedure, which includes change of level of care prior to discharge, I also consent to the administration of blood and/or blood products.  Further, I understand that despite careful testing and screening of blood or blood products by collecting agencies, I may still be subject to ill effects as a result of receiving a blood  transfusion and/or blood products.  The following are some, but not all, of the potential risks that can occur: fever and allergic reactions, hemolytic reactions, transmission of diseases such as Hepatitis, AIDS and Cytomegalovirus (CMV) and fluid overload.  In the event that I wish to have an autologous transfusion of my own blood, or a directed donor transfusion, I will discuss this with my physician.  Check only if Refusing Blood or Blood Products  I understand refusal of blood or blood products as deemed necessary by my physician may have serious consequences to my condition to include possible death. I hereby assume responsibility for my refusal and release the hospital, its personnel, and my physicians from any responsibility for the consequences of my refusal.          o  Refuse      5.   I authorize the use of any specimen, organs, tissues, body parts or foreign objects that may be removed from my body during the operation/procedure for diagnosis, research or teaching purposes and their subsequent disposal by hospital authorities.  I also authorize the release of specimen test results and/or written reports to my treating physician on the hospital medical staff or other referring or consulting physicians involved in my care, at the discretion of the Pathologist or my treating physician.    6.   I consent to the photographing or videotaping of the operations or procedures to be performed, including appropriate portions of my body for medical, scientific, or educational purposes, provided my identity is not revealed by the pictures or by descriptive texts accompanying them.  If the procedure has been photographed/videotaped, the surgeon will obtain the original picture, image, videotape or CD.  The hospital will not be responsible for storage, release or maintenance of the picture, image, tape or CD.    7.   I consent to the presence of a  or observers in the operating room as deemed  necessary by my physician or their designees.    8.   I recognize that in the event my procedure results in extended X-Ray/fluoroscopy time, I may develop a skin reaction.    9. If I have a Do Not Attempt Resuscitation (DNAR) order in place, that status will be suspended while in the operating room, procedural suite, and during the recovery period unless otherwise explicitly stated by me (or a person authorized to consent on my behalf). The surgeon or my attending physician will determine when the applicable recovery period ends for purposes of reinstating the DNAR order.  10. Patients having a sterilization procedure: I understand that if the procedure is successful the results will be permanent and it will therefore be impossible for me to inseminate, conceive, or bear children.  I also understand that the procedure is intended to result in sterility, although the result has not been guaranteed.   11. I acknowledge that my physician has explained sedation/analgesia administration to me including the risk and benefits I consent to the administration of sedation/analgesia as may be necessary or desirable in the judgment of my physician.    I CERTIFY THAT I HAVE READ AND FULLY UNDERSTAND THE ABOVE CONSENT TO OPERATION and/or OTHER PROCEDURE.    _________________________________________  __________________________________  Signature of Patient     Signature of Responsible Person         ___________________________________         Printed Name of Responsible Person           _________________________________                 Relationship to Patient  _________________________________________  ______________________________  Signature of Witness          Date  Time      Patient Name: Reilly NASSAR Salus     : 10/31/1950                 Printed: 2025     Medical Record #: [id number]                     Page 1 of 2                                    [address]   [id number]    Consent for Anesthesia    I, Bruce R Salus agree to be cared for by an anesthesiologist, who is specially trained to monitor me and give me medicine to put me to sleep or keep me comfortable during my procedure    I understand that my anesthesiologist is not an employee or agent of [facility] or [employer]. He or she works for [employer].    As the patient asking for anesthesia services, I agree to:  Allow the anesthesiologist (anesthesia doctor) to give me medicine and do additional procedures as necessary. Some examples are: Starting or using an “IV” to give me medicine, fluids or blood during my procedure, and having a breathing tube placed to help me breathe when I’m asleep (intubation). In the event that my heart stops working properly, I understand that my anesthesiologist will make every effort to sustain my life, unless otherwise directed by [facility] Do Not Resuscitate documents.  Tell my anesthesia doctor before my procedure:  If I am pregnant.  The last time that I ate or drank.  All of the medicines I take (including prescriptions, herbal supplements, and pills I can buy without a prescription (including street drugs/illegal medications). Failure to inform my anesthesiologist about these medicines may increase my risk of anesthetic complications.  If I am allergic to anything or have had a reaction to anesthesia before.  I understand how the anesthesia medicine will help me (benefits).  I understand that with any type of anesthesia medicine there are risks:  The most common risks are: nausea, vomiting, sore throat, muscle soreness, damage to my eyes, mouth, or teeth (from breathing tube placement).  Rare risks include: remembering what happened during my procedure, allergic reactions to medications, injury to my airway, heart, lungs, vision, nerves, or muscles and in extremely rare instances death.  My doctor has explained to me other choices available  to me for my care (alternatives).  Pregnant Patients (“epidural”):  I understand that the risks of having an epidural (medicine given into my back to help control pain during labor), include itching, low blood pressure, difficulty urinating, headache or slowing of the baby’s heart. Very rare risks include infection, bleeding, seizure, irregular heart rhythms and nerve injury.  Regional Anesthesia (“spinal”, “epidural”, & “nerve blocks”):  I understand that rare but potential complications include headache, bleeding, infection, seizure, irregular heart rhythms, and nerve injury.    I can change my mind about having anesthesia services at any time before I get the medicine.    _____________________________________________________________________________  Patient (or Representative) Signature/Relationship to Patient  Date   Time    _____________________________________________________________________________   Name (if used)    Language/Organization   Time    _____________________________________________________________________________  Anesthesiologist Signature     Date   Time  I have discussed the procedure and information above with the patient (or patient’s representative) and answered their questions. The patient or their representative has agreed to have anesthesia services.    _____________________________________________________________________________  Witness        Date   Time  I have verified that the signature is that of the patient or patient’s representative, and that it was signed before the procedure  Patient Name: Bruce R Salus     : 10/31/1950                 Printed: 2025     Medical Record #: [id number]                     Page 2 of 2

## (undated) NOTE — LETTER
[address]  [address]  Authorization for Surgical Operation and Procedure     Date:___________                                                                                                         Time:__________  I hereby authorize Surgeon(s):  Eduin Luna MD, my physician and his/her assistants (if applicable), which may include medical students, residents, and/or fellows, to perform the following surgical operation/ procedure and administer such anesthesia as may be determined necessary by my physician:  Operation/Procedure name (s) Procedure(s):  CYSTOSCOPY, complicatited  MARAVILLA CATHETER PLACEMENT on Bruce R Salus   2.   I recognize that during the surgical operation/procedure, unforeseen conditions may necessitate additional or different procedures than those listed above.  I, therefore, further authorize and request that the above-named surgeon, assistants, or designees perform such procedures as are, in their judgment, necessary and desirable.    3.   My surgeon/physician has discussed prior to my surgery the potential benefits, risks and side effects of this procedure; the likelihood of achieving goals; and potential problems that might occur during recuperation.  They also discussed reasonable alternatives to the procedure, including risks, benefits, and side effects related to the alternatives and risks related to not receiving this procedure.  I have had all my questions answered and I acknowledge that no guarantee has been made as to the result that may be obtained.    4.   Should the need arise during my operation/procedure, which includes change of level of care prior to discharge, I also consent to the administration of blood and/or blood products.  Further, I understand that despite careful testing and screening of blood or blood products by collecting agencies, I may still be subject to ill effects as a result of receiving a blood transfusion and/or blood  products.  The following are some, but not all, of the potential risks that can occur: fever and allergic reactions, hemolytic reactions, transmission of diseases such as Hepatitis, AIDS and Cytomegalovirus (CMV) and fluid overload.  In the event that I wish to have an autologous transfusion of my own blood, or a directed donor transfusion, I will discuss this with my physician.  Check only if Refusing Blood or Blood Products  I understand refusal of blood or blood products as deemed necessary by my physician may have serious consequences to my condition to include possible death. I hereby assume responsibility for my refusal and release the hospital, its personnel, and my physicians from any responsibility for the consequences of my refusal.          o  Refuse      5.   I authorize the use of any specimen, organs, tissues, body parts or foreign objects that may be removed from my body during the operation/procedure for diagnosis, research or teaching purposes and their subsequent disposal by hospital authorities.  I also authorize the release of specimen test results and/or written reports to my treating physician on the hospital medical staff or other referring or consulting physicians involved in my care, at the discretion of the Pathologist or my treating physician.    6.   I consent to the photographing or videotaping of the operations or procedures to be performed, including appropriate portions of my body for medical, scientific, or educational purposes, provided my identity is not revealed by the pictures or by descriptive texts accompanying them.  If the procedure has been photographed/videotaped, the surgeon will obtain the original picture, image, videotape or CD.  The hospital will not be responsible for storage, release or maintenance of the picture, image, tape or CD.    7.   I consent to the presence of a  or observers in the operating room as deemed necessary by my physician or  their designees.    8.   I recognize that in the event my procedure results in extended X-Ray/fluoroscopy time, I may develop a skin reaction.    9. If I have a Do Not Attempt Resuscitation (DNAR) order in place, that status will be suspended while in the operating room, procedural suite, and during the recovery period unless otherwise explicitly stated by me (or a person authorized to consent on my behalf). The surgeon or my attending physician will determine when the applicable recovery period ends for purposes of reinstating the DNAR order.  10. Patients having a sterilization procedure: I understand that if the procedure is successful the results will be permanent and it will therefore be impossible for me to inseminate, conceive, or bear children.  I also understand that the procedure is intended to result in sterility, although the result has not been guaranteed.   11. I acknowledge that my physician has explained sedation/analgesia administration to me including the risk and benefits I consent to the administration of sedation/analgesia as may be necessary or desirable in the judgment of my physician.    I CERTIFY THAT I HAVE READ AND FULLY UNDERSTAND THE ABOVE CONSENT TO OPERATION and/or OTHER PROCEDURE.    _________________________________________  __________________________________  Signature of Patient     Signature of Responsible Person         ___________________________________         Printed Name of Responsible Person           _________________________________                 Relationship to Patient  _________________________________________  ______________________________  Signature of Witness          Date  Time      Patient Name: Bruce R Salus     : 10/31/1950                 Printed: 2025     Medical Record #: [id number]                     Page [address] of [address] [place] [address] [place]  [address]    Consent for Anesthesia    I, Reilly  R Salus agree to be cared for by an anesthesiologist, who is specially trained to monitor me and give me medicine to put me to sleep or keep me comfortable during my procedure    I understand that my anesthesiologist is not an employee or agent of [facility]. He or she works for [employer].    As the patient asking for anesthesia services, I agree to:  Allow the anesthesiologist (anesthesia doctor) to give me medicine and do additional procedures as necessary. Some examples are: Starting or using an “IV” to give me medicine, fluids or blood during my procedure, and having a breathing tube placed to help me breathe when I’m asleep (intubation). In the event that my heart stops working properly, I understand that my anesthesiologist will make every effort to sustain my life, unless otherwise directed by [facility] Do Not Resuscitate documents.  Tell my anesthesia doctor before my procedure:  If I am pregnant.  The last time that I ate or drank.  All of the medicines I take (including prescriptions, herbal supplements, and pills I can buy without a prescription (including street drugs/illegal medications). Failure to inform my anesthesiologist about these medicines may increase my risk of anesthetic complications.  If I am allergic to anything or have had a reaction to anesthesia before.  I understand how the anesthesia medicine will help me (benefits).  I understand that with any type of anesthesia medicine there are risks:  The most common risks are: nausea, vomiting, sore throat, muscle soreness, damage to my eyes, mouth, or teeth (from breathing tube placement).  Rare risks include: remembering what happened during my procedure, allergic reactions to medications, injury to my airway, heart, lungs, vision, nerves, or muscles and in extremely rare instances death.  My doctor has explained to me other choices available to me for my care (alternatives).  Pregnant  Patients (“epidural”):  I understand that the risks of having an epidural (medicine given into my back to help control pain during labor), include itching, low blood pressure, difficulty urinating, headache or slowing of the baby’s heart. Very rare risks include infection, bleeding, seizure, irregular heart rhythms and nerve injury.  Regional Anesthesia (“spinal”, “epidural”, & “nerve blocks”):  I understand that rare but potential complications include headache, bleeding, infection, seizure, irregular heart rhythms, and nerve injury.    I can change my mind about having anesthesia services at any time before I get the medicine.    _____________________________________________________________________________  Patient (or Representative) Signature/Relationship to Patient  Date   Time    _____________________________________________________________________________   Name (if used)    Language/Organization   Time    _____________________________________________________________________________  Anesthesiologist Signature     Date   Time  I have discussed the procedure and information above with the patient (or patient’s representative) and answered their questions. The patient or their representative has agreed to have anesthesia services.    _____________________________________________________________________________  Witness        Date   Time  I have verified that the signature is that of the patient or patient’s representative, and that it was signed before the procedure  Patient Name: Bruce R Salus     : 10/31/1950                 Printed: 2025     Medical Record #: [id number]                     Page 2 of 2

## (undated) NOTE — LETTER
[address]  [address]  Consent for Procedure/Sedation  Date: 4/22/25         Time: 0730    I hereby authorize Dr JIMMIE Rebollar, my physician and his/her assistants (if applicable), which may include medical students, residents, and/or fellows, to perform the following surgical operation/ procedure and administer such anesthesia as may be determined necessary by my physician: Abdominal Pleurx Catheter Insertion on Bruce R Salus  2.   I recognize that during the surgical operation/procedure, unforeseen conditions may necessitate additional or different procedures than those listed above.  I, therefore, further authorize and request that the above-named surgeon, assistants, or designees perform such procedures as are, in their judgment, necessary and desirable.    3.   My surgeon/physician has discussed prior to my surgery the potential benefits, risks and side effects of this procedure; the likelihood of achieving goals; and potential problems that might occur during recuperation.  They also discussed reasonable alternatives to the procedure, including risks, benefits, and side effects related to the alternatives and risks related to not receiving this procedure.  I have had all my questions answered and I acknowledge that no guarantee has been made as to the result that may be obtained.    4.   Should the need arise during my operation/procedure, which includes change of level of care prior to discharge, I also consent to the administration of blood and/or blood products.  Further, I understand that despite careful testing and screening of blood or blood products by collecting agencies, I may still be subject to ill effects as a result of receiving a blood transfusion and/or blood products.  The following are some, but not all, of the potential risks that can occur: fever and allergic reactions, hemolytic reactions, transmission of diseases such as Hepatitis, AIDS and Cytomegalovirus  (CMV) and fluid overload.  In the event that I wish to have an autologous transfusion of my own blood, or a directed donor transfusion, I will discuss this with my physician.   Check only if Refusing Blood or Blood Products  I understand refusal of blood or blood products as deemed necessary by my physician may have serious consequences to my condition to include possible death. I hereby assume responsibility for my refusal and release the hospital, its personnel, and my physicians from any responsibility for the consequences of my refusal.         o  Refuse         5.   I authorize the use of any specimen, organs, tissues, body parts or foreign objects that may be removed from my body during the operation/procedure for diagnosis, research or teaching purposes and their subsequent disposal by hospital authorities.  I also authorize the release of specimen test results and/or written reports to my treating physician on the hospital medical staff or other referring or consulting physicians involved in my care, at the discretion of the Pathologist or my treating physician.    6.   I consent to the photographing or videotaping of the operations or procedures to be performed, including appropriate portions of my body for medical, scientific, or educational purposes, provided my identity is not revealed by the pictures or by descriptive texts accompanying them.  If the procedure has been photographed/videotaped, the surgeon will obtain the original picture, image, videotape or CD.  The hospital will not be responsible for storage, release or maintenance of the picture, image, tape or CD.    7.   I consent to the presence of a  or observers in the operating room as deemed necessary by my physician or their designees.    8.   I recognize that in the event my procedure results in extended X-Ray/fluoroscopy time, I may develop a skin reaction.    9. If I have a Do Not Attempt Resuscitation (DNAR) order in  place, that status will be suspended while in the operating room, procedural suite, and during the recovery period unless otherwise explicitly stated by me (or a person authorized to consent on my behalf). The surgeon or my attending physician will determine when the applicable recovery period ends for purposes of reinstating the DNAR order.  10. Patients having a sterilization procedure: I understand that if the procedure is successful the results will be permanent and it will therefore be impossible for me to inseminate, conceive, or bear children.  I also understand that the procedure is intended to result in sterility, although the result has not been guaranteed.   11. I acknowledge that my physician has explained sedation/analgesia administration to me including the risk and benefits I consent to the administration of sedation/analgesia as may be necessary or desirable in the judgment of my physician.    I CERTIFY THAT I HAVE READ AND FULLY UNDERSTAND THE ABOVE CONSENT TO OPERATION and/or OTHER PROCEDURE.        ____________________________________       _________________________________      ______________________________  Signature of Patient         Signature of Responsible Person        Printed Name of Responsible Person        ____________________________________      _________________________________      ______________________________       Signature of Witness          Relationship to Patient                       Date                                       Time  Patient Name: Bruce R Salus  : 10/31/1950    Reviewed: 2024   Printed: 2025  Medical Record #: [id number] Page 1 of 1